# Patient Record
Sex: MALE | Race: WHITE
[De-identification: names, ages, dates, MRNs, and addresses within clinical notes are randomized per-mention and may not be internally consistent; named-entity substitution may affect disease eponyms.]

---

## 2019-02-04 ENCOUNTER — HOSPITAL ENCOUNTER (OUTPATIENT)
Dept: HOSPITAL 53 - M SMT | Age: 64
End: 2019-02-04
Attending: INTERNAL MEDICINE
Payer: MEDICARE

## 2019-02-04 DIAGNOSIS — M54.5: Primary | ICD-10-CM

## 2019-02-04 DIAGNOSIS — M15.0: ICD-10-CM

## 2019-02-04 NOTE — REP
LEFT SHOULDER, THREE VIEWS:

 

HISTORY:  Osteoarthritis.

 

There is no acute fracture or dislocation.  There is marked narrowing of the

acromioclavicular joint space with associated osteophyte formation.

Calcification is present superior to the acromioclavicular joint space.  This

represents ligamentous or tendon calcification.

 

IMPRESSION:

 

Degenerative change as described above.

 

 

Electronically Signed by

Cornelius Zuluaga MD 02/04/2019 01:06 P

## 2019-02-04 NOTE — REP
BILATERAL HAND, EIGHT VIEWS:

 

HISTORY:  Osteoarthritis.

 

RIGHT HAND:

 

There is no acute fracture or dislocation.  The joint spaces are normal in

appearance.  Osteophytes are present at the distal interphalangeal joints of the

second and third digits.

 

IMPRESSION:

 

Degenerative change as described above.

 

LEFT HAND:

 

There is no acute fracture or dislocation.  The joint spaces are normal in

appearance. Calcification is present lateral to the distal interphalangeal joint

of the second digit.  This represents ligamentous or tendon calcification.

 

IMPRESSION:

 

Degenerative change as described above.

 

 

Electronically Signed by

Cornelius Zuluaga MD 02/04/2019 01:07 P

## 2019-02-04 NOTE — REP
AP STANDING AND BILATERAL KNEES ONE VIEW:

 

HISTORY: Osteoarthritis.

 

There is no acute fracture or dislocation. There is minimal narrowing of the knee

joint spaces.

 

IMPRESSION:Degenerative changes as described above.

 

 

Electronically Signed by

Cornelius Zuluaga MD 02/04/2019 01:06 P

## 2019-02-04 NOTE — REP
PARTIAL LUMBAR SPINE, THREE VIEWS:

 

HISTORY:  Osteoarthritis.

 

There is no acute fracture or subluxation.  The L3-4 through L5-S1 intervertebral

discs are decreased in height consistent with disc degeneration.  Osteophytes are

present on L3-S1.

 

IMPRESSION:

 

Degenerative change as described above.

 

 

Electronically Signed by

Cornelius Zuluaga MD 02/04/2019 12:40 P

## 2021-03-05 ENCOUNTER — HOSPITAL ENCOUNTER (INPATIENT)
Dept: HOSPITAL 53 - M ED | Age: 66
LOS: 36 days | DRG: 163 | End: 2021-04-10
Attending: INTERNAL MEDICINE | Admitting: THORACIC SURGERY (CARDIOTHORACIC VASCULAR SURGERY)
Payer: MEDICARE

## 2021-03-05 VITALS — SYSTOLIC BLOOD PRESSURE: 120 MMHG | DIASTOLIC BLOOD PRESSURE: 70 MMHG

## 2021-03-05 VITALS — HEIGHT: 72 IN | WEIGHT: 157.63 LBS | BODY MASS INDEX: 21.35 KG/M2

## 2021-03-05 VITALS — SYSTOLIC BLOOD PRESSURE: 113 MMHG | DIASTOLIC BLOOD PRESSURE: 66 MMHG

## 2021-03-05 VITALS — DIASTOLIC BLOOD PRESSURE: 77 MMHG | SYSTOLIC BLOOD PRESSURE: 133 MMHG

## 2021-03-05 VITALS — DIASTOLIC BLOOD PRESSURE: 63 MMHG | SYSTOLIC BLOOD PRESSURE: 110 MMHG

## 2021-03-05 VITALS — SYSTOLIC BLOOD PRESSURE: 111 MMHG | DIASTOLIC BLOOD PRESSURE: 79 MMHG

## 2021-03-05 VITALS — SYSTOLIC BLOOD PRESSURE: 108 MMHG | DIASTOLIC BLOOD PRESSURE: 73 MMHG

## 2021-03-05 VITALS — SYSTOLIC BLOOD PRESSURE: 139 MMHG | DIASTOLIC BLOOD PRESSURE: 79 MMHG

## 2021-03-05 VITALS — SYSTOLIC BLOOD PRESSURE: 115 MMHG | DIASTOLIC BLOOD PRESSURE: 68 MMHG

## 2021-03-05 VITALS — SYSTOLIC BLOOD PRESSURE: 117 MMHG | DIASTOLIC BLOOD PRESSURE: 68 MMHG

## 2021-03-05 VITALS — DIASTOLIC BLOOD PRESSURE: 63 MMHG | SYSTOLIC BLOOD PRESSURE: 129 MMHG

## 2021-03-05 VITALS — SYSTOLIC BLOOD PRESSURE: 105 MMHG | DIASTOLIC BLOOD PRESSURE: 66 MMHG

## 2021-03-05 DIAGNOSIS — E87.5: ICD-10-CM

## 2021-03-05 DIAGNOSIS — D75.1: ICD-10-CM

## 2021-03-05 DIAGNOSIS — J44.9: ICD-10-CM

## 2021-03-05 DIAGNOSIS — J96.00: ICD-10-CM

## 2021-03-05 DIAGNOSIS — T81.82XA: ICD-10-CM

## 2021-03-05 DIAGNOSIS — I69.351: ICD-10-CM

## 2021-03-05 DIAGNOSIS — E11.40: ICD-10-CM

## 2021-03-05 DIAGNOSIS — J86.0: ICD-10-CM

## 2021-03-05 DIAGNOSIS — Z79.899: ICD-10-CM

## 2021-03-05 DIAGNOSIS — Z66: ICD-10-CM

## 2021-03-05 DIAGNOSIS — K21.9: ICD-10-CM

## 2021-03-05 DIAGNOSIS — R57.9: ICD-10-CM

## 2021-03-05 DIAGNOSIS — J84.10: ICD-10-CM

## 2021-03-05 DIAGNOSIS — I27.20: ICD-10-CM

## 2021-03-05 DIAGNOSIS — M19.90: ICD-10-CM

## 2021-03-05 DIAGNOSIS — M51.37: ICD-10-CM

## 2021-03-05 DIAGNOSIS — M32.9: ICD-10-CM

## 2021-03-05 DIAGNOSIS — I63.9: ICD-10-CM

## 2021-03-05 DIAGNOSIS — J93.83: Primary | ICD-10-CM

## 2021-03-05 DIAGNOSIS — E11.649: ICD-10-CM

## 2021-03-05 DIAGNOSIS — Z51.5: ICD-10-CM

## 2021-03-05 LAB
ALBUMIN SERPL BCG-MCNC: 3.9 GM/DL (ref 3.2–5.2)
ALT SERPL W P-5'-P-CCNC: 94 U/L (ref 12–78)
BASE EXCESS BLDA CALC-SCNC: 0.3 MMOL/L (ref -2–2)
BASOPHILS # BLD AUTO: 0.2 10^3/UL (ref 0–0.2)
BASOPHILS NFR BLD AUTO: 0.9 % (ref 0–1)
BILIRUB CONJ SERPL-MCNC: 0.4 MG/DL (ref 0–0.2)
BILIRUB SERPL-MCNC: 1.1 MG/DL (ref 0.2–1)
BUN SERPL-MCNC: 37 MG/DL (ref 7–18)
CALCIUM SERPL-MCNC: 8.8 MG/DL (ref 8.8–10.2)
CHLORIDE SERPL-SCNC: 104 MEQ/L (ref 98–107)
CK MB CFR.DF SERPL CALC: 1.64
CK MB SERPL-MCNC: 11.8 NG/ML (ref ?–3.6)
CK SERPL-CCNC: 721 U/L (ref 39–308)
CO2 BLDA CALC-SCNC: 27.5 MEQ/L (ref 23–31)
CO2 SERPL-SCNC: 32 MEQ/L (ref 21–32)
CREAT SERPL-MCNC: 1.07 MG/DL (ref 0.7–1.3)
EOSINOPHIL # BLD AUTO: 0 10^3/UL (ref 0–0.5)
EOSINOPHIL NFR BLD AUTO: 0.1 % (ref 0–3)
GFR SERPL CREATININE-BSD FRML MDRD: > 60 ML/MIN/{1.73_M2} (ref 49–?)
GLUCOSE SERPL-MCNC: 361 MG/DL (ref 70–100)
HCO3 BLDA-SCNC: 26.1 MEQ/L (ref 22–26)
HCO3 STD BLDA-SCNC: 24.7 MEQ/L (ref 22–26)
HCT VFR BLD AUTO: 57.4 % (ref 42–52)
HGB BLD-MCNC: 18 G/DL (ref 13.5–17.5)
LYMPHOCYTES # BLD AUTO: 0.2 10^3/UL (ref 1.5–5)
LYMPHOCYTES NFR BLD AUTO: 1.2 % (ref 24–44)
MCH RBC QN AUTO: 28.8 PG (ref 27–33)
MCHC RBC AUTO-ENTMCNC: 31.4 G/DL (ref 32–36.5)
MCV RBC AUTO: 91.8 FL (ref 80–96)
MONOCYTES # BLD AUTO: 0.5 10^3/UL (ref 0–0.8)
MONOCYTES NFR BLD AUTO: 2.9 % (ref 2–8)
NEUTROPHILS # BLD AUTO: 15.4 10^3/UL (ref 1.5–8.5)
NEUTROPHILS NFR BLD AUTO: 90.5 % (ref 36–66)
NT-PRO BNP: 2099 PG/ML (ref ?–125)
PCO2 BLDA: 45.8 MMHG (ref 35–45)
PH BLDA: 7.37 UNITS (ref 7.35–7.45)
PLATELET # BLD AUTO: 200 10^3/UL (ref 150–450)
PO2 BLDA: 78.3 MMHG (ref 75–100)
POTASSIUM SERPL-SCNC: 5.2 MEQ/L (ref 3.5–5.1)
PROT SERPL-MCNC: 6.6 GM/DL (ref 6.4–8.2)
RBC # BLD AUTO: 6.25 10^6/UL (ref 4.3–6.1)
SAO2 % BLDA: 96 % (ref 95–99)
SODIUM SERPL-SCNC: 140 MEQ/L (ref 136–145)
TROPONIN I SERPL-MCNC: 0.1 NG/ML (ref ?–0.1)
TSH SERPL DL<=0.005 MIU/L-ACNC: 0.52 UIU/ML (ref 0.36–3.74)
WBC # BLD AUTO: 17 10^3/UL (ref 4–10)

## 2021-03-05 PROCEDURE — 0W9930Z DRAINAGE OF RIGHT PLEURAL CAVITY WITH DRAINAGE DEVICE, PERCUTANEOUS APPROACH: ICD-10-PCS | Performed by: THORACIC SURGERY (CARDIOTHORACIC VASCULAR SURGERY)

## 2021-03-05 RX ADMIN — INSULIN LISPRO SCH UNITS: 100 INJECTION, SOLUTION INTRAVENOUS; SUBCUTANEOUS at 22:11

## 2021-03-05 RX ADMIN — KETOROLAC TROMETHAMINE SCH MG: 30 INJECTION, SOLUTION INTRAMUSCULAR at 17:30

## 2021-03-05 RX ADMIN — LEVALBUTEROL HYDROCHLORIDE SCH MG: 1.25 SOLUTION, CONCENTRATE RESPIRATORY (INHALATION) at 19:15

## 2021-03-05 RX ADMIN — DOCUSATE SODIUM SCH MG: 100 CAPSULE, LIQUID FILLED ORAL at 22:10

## 2021-03-05 RX ADMIN — HYDROXYCHLOROQUINE SULFATE SCH MG: 200 TABLET, FILM COATED ENTERAL at 22:09

## 2021-03-05 RX ADMIN — DOCUSATE SODIUM SCH MG: 100 CAPSULE, LIQUID FILLED ORAL at 22:16

## 2021-03-05 RX ADMIN — SODIUM CHLORIDE SCH UNITS: 4.5 INJECTION, SOLUTION INTRAVENOUS at 22:10

## 2021-03-05 NOTE — ECGEPIP
[facility] - ED

                                       

                                       Test Date:    2021

Pat Name:     ARIE FULLER               Department:   

Patient ID:   [id number]                 Room:         -

Gender:       Male                     Technician:   GLADYS

:          1955               Requested By: TITA GONZALES

Order Number: [id number]     Reading MD:   Yesenia Gee

                                 Measurements

Intervals                              Axis          

Rate:         80                       P:            80

SD:           182                      QRS:          20

QRSD:         100                      T:            -18

QT:           372                                    

QTc:          429                                    

                           Interpretive Statements

Normal sinus rhythm

Inferior infarct , age undetermined

NSTTW abnormalities

No prior

Electronically Signed on 3-5-2021 20:14:11 EST by Yesenia Gee

## 2021-03-05 NOTE — REP
INDICATION:

after chest tube placement



COMPARISON:

03/05/2021 at 3:16 p.m.



TECHNIQUE:

Portable AP view of the chest



FINDINGS:

Right-sided chest tube has been placed and there is been significant re-expansion to

the right hemithorax with small residual right apical pneumothorax now identified.

The bilateral lung fields demonstrate diffuse chronic fibrosis/interstitial changes.

No focal consolidation or effusion.



IMPRESSION:

Small residual right apical pneumothorax.

Underlying diffuse chronic interstitial changes again noted.





<Electronically signed by Nixon Rojas > 03/05/21 5034

## 2021-03-05 NOTE — HPE
HISTORY AND PHYSICAL



DATE OF ADMISSION:  03/05/2021



HISTORY OF PRESENT ILLNESS: Nixon Christine is a 65-year-old with pulmonary

interstitial fibrosis who is followed [facility] by Dr. Benitez and Dr. Emery. He presented with a spontaneous pneumothorax of the right lung. I saw

him soon after the chest tube was placed. He was having some pleuritic pain

which limited his interest in providing a whole history and physical, with more

of that to gather tomorrow. He has pulmonary interstitial fibrosis and sees the

pulmonologist. He has chronic low back pain. He used to go to the pain clinic

in [facility] for that. Type 2 diabetes. He is on chronic steroid

therapy. He was diagnosed with rheumatoid arthritis back in the 1980s. He saw a

rheumatologist in [place]. He saw [facility] rheumatologist, Dr. Bejarano when

she was in town, most recently two years ago. She thought that the patient had

osteoarthritis and not rheumatoid arthritis, as all serologic workup for

rheumatoid arthritis was unremarkable, including anti-CCP and negative

rheumatoid factor and negative inflammatory markers. Uric acid level was normal

and radiographic studies just showed degenerative disease. Since then, it looks

like he has been placed on some rheumatoid medications. We will investigate

that further tomorrow. In particular, he carries the diagnosis of lupus, but I

am not sure where the diagnosis was made and who is treating him with these

agents. 



SOCIAL HISTORY: He is a nonsmoker, no alcohol. 



FAMILY HISTORY: Positive for diabetes. Father had rheumatoid arthritis and lung

cancer. His sister has rheumatoid arthritis. 



ALLERGIES: None to any medications. 



REVIEW OF SYSTEMS: The review of systems was briefly obtained. No hemoptysis,

rectal bleeding, urinary bleeding, fever, chills, shortness of breath, stable

until abrupt worsening today.



PHYSICAL EXAMINATION:

VITAL SIGNS: He was seen in PCU and his chest tube had just been placed and he

was having some pleuritic pain. Respiratory rate is around 30. Vital signs per

flow sheet.

GENERAL APPEARANCE: He looks chronically ill. Temporal wasting noted.

Sarcopenia noted.

NECK: Supple. No thyromegaly.

LUNGS: Decreased breath sounds, fibrotic rales. Good air movement bilaterally. 

HEART:  Regular rhythm, no murmur. 

ABDOMEN: Soft and nontender with no masses. 

EXTREMITIES: No clubbing, cyanosis, or edema. Decreased pulses, but deep is

still palpable. 



IMPRESSION:

1.  Spontaneous pneumothorax status post right chest tube by Dr. Hay. He

    and I have discussed the case. He will remain with a chest tube. 

2.  Diabetes with a sliding scale of insulin based upon monitoring his blood

    sugars until his oral intake is assured.

3.  Mild hyperkalemia. I will change his IV fluids. Currently, he has IV fluids

    going with supplemental potassium. 

4.  ? lupus. We will investigate this further tomorrow. It does look like he is

    on chronic steroids which we will need to continue. He will be at risk of

    adrenal suppression, as his dose is actually 60 mg daily as it looks on his

    ER record.

5.  Chronic pain of lumbosacral degenerative disk disease and degenerative

    arthritis. Early ambulation advised. Chronic pain could limit his recovery.

## 2021-03-05 NOTE — RO
OPERATIVE NOTE



DATE OF OPERATION: 03/05/2021



PREOPERATIVE DIAGNOSIS: 

Acute right-sided pneumothorax.



POSTOPERATIVE DIAGNOSIS:

Acute right-sided pneumothorax.



PROCEDURE: Insertion of anterior-superior chest tube. 



SURGEON: Dr. Cruz Hay



DESCRIPTION OF PROCEDURE: Under satisfactory moderate sedation achieved with 2

mg of Versed, patient was prepped and draped in the usual sterile fashion.

Incision was made over the 2nd rib and tunnel created in the chest without

difficulty after infiltrating the skin, subcutaneous tissue, and pleura with

Xylocaine. A #20 chest tube was placed and aimed toward the apex. It was

secured to the chest wall with #2 Tevdek suture and connected to the

Pleur-evac. Patient tolerated the procedure well. A chest x-ray is pending.

## 2021-03-05 NOTE — REP
INDICATION:

DYSPNEA/COUGH



COMPARISON:

None.



TECHNIQUE:

Portable AP view of the chest



FINDINGS:

Large right pneumothorax.  The lung fields demonstrate diffuse advanced underlying

fibrotic and emphysematous changes bilaterally.  No focal consolidation.  No effusion.

Mild cardiomegaly cannot be excluded.



IMPRESSION:

Large right pneumothorax.  ER immediately notified.

Underlying emphysematous changes and fibrotic changes noted bilaterally.





<Electronically signed by Nixon Rojas > 03/05/21 0469

## 2021-03-06 VITALS — DIASTOLIC BLOOD PRESSURE: 58 MMHG | SYSTOLIC BLOOD PRESSURE: 96 MMHG

## 2021-03-06 VITALS — SYSTOLIC BLOOD PRESSURE: 110 MMHG | DIASTOLIC BLOOD PRESSURE: 60 MMHG

## 2021-03-06 VITALS — SYSTOLIC BLOOD PRESSURE: 137 MMHG | DIASTOLIC BLOOD PRESSURE: 67 MMHG

## 2021-03-06 VITALS — DIASTOLIC BLOOD PRESSURE: 57 MMHG | SYSTOLIC BLOOD PRESSURE: 111 MMHG

## 2021-03-06 VITALS — DIASTOLIC BLOOD PRESSURE: 68 MMHG | SYSTOLIC BLOOD PRESSURE: 153 MMHG

## 2021-03-06 VITALS — DIASTOLIC BLOOD PRESSURE: 64 MMHG | SYSTOLIC BLOOD PRESSURE: 102 MMHG

## 2021-03-06 LAB
BASE EXCESS BLDA CALC-SCNC: 0.8 MMOL/L (ref -2–2)
BASOPHILS # BLD AUTO: 0.1 10^3/UL (ref 0–0.2)
BASOPHILS NFR BLD AUTO: 0.8 % (ref 0–1)
BUN SERPL-MCNC: 37 MG/DL (ref 7–18)
CALCIUM SERPL-MCNC: 8.2 MG/DL (ref 8.8–10.2)
CHLORIDE SERPL-SCNC: 109 MEQ/L (ref 98–107)
CO2 BLDA CALC-SCNC: 31.5 MEQ/L (ref 23–31)
CO2 SERPL-SCNC: 34 MEQ/L (ref 21–32)
CREAT SERPL-MCNC: 0.87 MG/DL (ref 0.7–1.3)
EOSINOPHIL # BLD AUTO: 0.1 10^3/UL (ref 0–0.5)
EOSINOPHIL NFR BLD AUTO: 0.5 % (ref 0–3)
GFR SERPL CREATININE-BSD FRML MDRD: > 60 ML/MIN/{1.73_M2} (ref 49–?)
GLUCOSE SERPL-MCNC: 65 MG/DL (ref 70–100)
HCO3 BLDA-SCNC: 29.6 MEQ/L (ref 22–26)
HCO3 STD BLDA-SCNC: 25.2 MEQ/L (ref 22–26)
HCT VFR BLD AUTO: 55 % (ref 42–52)
HGB BLD-MCNC: 16.5 G/DL (ref 13.5–17.5)
LYMPHOCYTES # BLD AUTO: 0.7 10^3/UL (ref 1.5–5)
LYMPHOCYTES NFR BLD AUTO: 3.7 % (ref 24–44)
MAGNESIUM SERPL-MCNC: 2.8 MG/DL (ref 1.8–2.4)
MCH RBC QN AUTO: 28.6 PG (ref 27–33)
MCHC RBC AUTO-ENTMCNC: 30 G/DL (ref 32–36.5)
MCV RBC AUTO: 95.5 FL (ref 80–96)
MONOCYTES # BLD AUTO: 1.3 10^3/UL (ref 0–0.8)
MONOCYTES NFR BLD AUTO: 7 % (ref 2–8)
NEUTROPHILS # BLD AUTO: 15.8 10^3/UL (ref 1.5–8.5)
NEUTROPHILS NFR BLD AUTO: 85.1 % (ref 36–66)
PCO2 BLDA: 64.1 MMHG (ref 35–45)
PH BLDA: 7.28 UNITS (ref 7.35–7.45)
PLATELET # BLD AUTO: 162 10^3/UL (ref 150–450)
PO2 BLDA: 214 MMHG (ref 75–100)
POTASSIUM SERPL-SCNC: 4.6 MEQ/L (ref 3.5–5.1)
RBC # BLD AUTO: 5.76 10^6/UL (ref 4.3–6.1)
SAO2 % BLDA: 99.4 % (ref 95–99)
SODIUM SERPL-SCNC: 145 MEQ/L (ref 136–145)
WBC # BLD AUTO: 18.5 10^3/UL (ref 4–10)

## 2021-03-06 RX ADMIN — LEVALBUTEROL HYDROCHLORIDE SCH MG: 1.25 SOLUTION, CONCENTRATE RESPIRATORY (INHALATION) at 07:04

## 2021-03-06 RX ADMIN — PANTOPRAZOLE SODIUM SCH MG: 40 TABLET, DELAYED RELEASE ORAL at 07:37

## 2021-03-06 RX ADMIN — LEVALBUTEROL HYDROCHLORIDE SCH MG: 1.25 SOLUTION, CONCENTRATE RESPIRATORY (INHALATION) at 13:08

## 2021-03-06 RX ADMIN — SODIUM CHLORIDE SCH UNITS: 4.5 INJECTION, SOLUTION INTRAVENOUS at 20:53

## 2021-03-06 RX ADMIN — LEVALBUTEROL HYDROCHLORIDE SCH MG: 1.25 SOLUTION, CONCENTRATE RESPIRATORY (INHALATION) at 21:04

## 2021-03-06 RX ADMIN — ATOVAQUONE SCH MG: 750 SUSPENSION ORAL at 07:36

## 2021-03-06 RX ADMIN — KETOROLAC TROMETHAMINE SCH MG: 30 INJECTION, SOLUTION INTRAMUSCULAR at 11:54

## 2021-03-06 RX ADMIN — LEVALBUTEROL HYDROCHLORIDE SCH MG: 1.25 SOLUTION, CONCENTRATE RESPIRATORY (INHALATION) at 02:00

## 2021-03-06 RX ADMIN — INSULIN LISPRO SCH UNITS: 100 INJECTION, SOLUTION INTRAVENOUS; SUBCUTANEOUS at 17:33

## 2021-03-06 RX ADMIN — INSULIN LISPRO SCH UNITS: 100 INJECTION, SOLUTION INTRAVENOUS; SUBCUTANEOUS at 07:30

## 2021-03-06 RX ADMIN — KETOROLAC TROMETHAMINE SCH MG: 30 INJECTION, SOLUTION INTRAMUSCULAR at 06:37

## 2021-03-06 RX ADMIN — HYDROXYCHLOROQUINE SULFATE SCH MG: 200 TABLET, FILM COATED ENTERAL at 07:37

## 2021-03-06 RX ADMIN — MAGNESIUM HYDROXIDE SCH ML: 400 SUSPENSION ORAL at 07:37

## 2021-03-06 RX ADMIN — KETOROLAC TROMETHAMINE SCH MG: 30 INJECTION, SOLUTION INTRAMUSCULAR at 23:51

## 2021-03-06 RX ADMIN — INSULIN LISPRO SCH UNITS: 100 INJECTION, SOLUTION INTRAVENOUS; SUBCUTANEOUS at 20:58

## 2021-03-06 RX ADMIN — HYDROXYCHLOROQUINE SULFATE SCH MG: 200 TABLET, FILM COATED ENTERAL at 20:52

## 2021-03-06 RX ADMIN — KETOROLAC TROMETHAMINE SCH MG: 30 INJECTION, SOLUTION INTRAMUSCULAR at 00:50

## 2021-03-06 RX ADMIN — INSULIN LISPRO SCH UNITS: 100 INJECTION, SOLUTION INTRAVENOUS; SUBCUTANEOUS at 11:54

## 2021-03-06 RX ADMIN — SODIUM CHLORIDE SCH UNITS: 4.5 INJECTION, SOLUTION INTRAVENOUS at 07:37

## 2021-03-06 RX ADMIN — KETOROLAC TROMETHAMINE SCH MG: 30 INJECTION, SOLUTION INTRAMUSCULAR at 17:34

## 2021-03-06 RX ADMIN — DOCUSATE SODIUM SCH MG: 100 CAPSULE, LIQUID FILLED ORAL at 07:37

## 2021-03-06 NOTE — IPN
PROGRESS NOTE



DATE:  03/06/2021



SUBJECTIVE: Nixon is feeling fairly well today, less short of breath. No chest

pain. No fever. He is hard of hearing, it is difficult to history from him.



OBJECTIVE: 

VITAL SIGNS: Afebrile. Vital signs are stable. 

LUNGS: Good air movement bilaterally. 

HEART: Regular rate and rhythm.

ABDOMEN: Soft, nontender.

EXTREMITIES: No peripheral edema. 



LABORATORY DATA: Potassium 7.46, creatinine is normal, blood sugar this morning

was 65, he was given some juice and it came up. White count 18.5, hemoglobin

16.5, platelets 162,000. 



IMPRESSION: 

  1.  Spontaneous pneumothorax right lung, status post chest tube by Dr. Hay who is managing this.

  2.  Diabetes, sliding scale insulin coverage, hypoglycemia this morning, was

      treated without significant intervention. 

  3.  ? lupus, continue his current regimen including his fairly high dose

      steroids.

## 2021-03-06 NOTE — REP
INDICATION:

IPF, pneumothorax



COMPARISON:

None



TECHNIQUE:

Axial noncontrast images from the thoracic inlet to the upper abdomen with coronal and

sagittal reformations.



This CT examination was performed using the following dose reduction techniques:

Automated exposure control, adjustment of mA and/or kv according to the patient's

size, and use of iterative reconstruction technique.



FINDINGS:

Right-sided chest tube via anterior approach extends to the apex and a small residual

pneumothorax is identified.  Diffuse bilateral advanced pulmonary fibrosis with

bronchiectasis noted along with 6.3 cm chronic pneumatocele at the medial right base.

Linear opacity along the apical aspect of the right major fissure which may represent

chronic thickening, trapped fluid or element of atelectasis.  Small patchy scattered

bilateral opacities noted.  No effusion.



Mediastinum demonstrates scattered reactive adenopathy.  Atherosclerotic changes to

the thoracic aorta and coronary arteries noted without aortic aneurysm.  Cardiomegaly

noted without pericardial effusion.  Surrounding musculoskeletal structures are intact.



IMPRESSION:

1. Small residual right pneumothorax.

2. Advanced diffuse pulmonary fibrosis and chronic changes.

3. Subtle small scattered opacities may reflect acute areas of infiltrate versus

chronic change.





<Electronically signed by Nixon Rojas > 03/06/21 0850

## 2021-03-06 NOTE — IPN
PROGRESS NOTE



DATE:  03/06/2021



SUBJECTIVE: Mr. Christine is breathing a lot better today. He still has an

increased respiratory rate, but he can now speak in full sentences.  



OBJECTIVE: 

VITAL SIGNS: Show a T-max of 97.9 with a heart rate that ranges between 66 and

79 in sinus rhythm. Respiratory rate of 18 to 19 without the use of accessory

muscles who is 96% to 97% saturated on 4 liters nasal cannula and whose blood

pressure is ranging between 153/68 to 102/64. When I examined him this morning,

his respiratory rate is more in the mid to high 20s. 

INTAKE AND OUTPUT: Over the past 24 hours has been recorded as 710 in and 400

out for a positivity of 300 mL. His chest tube output is not recorded. His

weight today is 66.1 kg compared to 63.1 kg yesterday. I do not see an air leak

in his chest tube. I will speak to the nursing staff regarding the absence of

chest tube output recording. 

RESPIRATORY: He has equal breath sounds on either side. At the very end of

inspiration, he has velcro crackles. Scattered among the velcro crackles are

coarse rhonchi, which do not clear with coughing. Percussion note is full to

the diaphragm. There is no subcutaneous emphysema on the chest wall.  

CARDIAC: Without murmurs, clicks, gallops, or rubs. I cannot feel his PMI. S1

and S2 are normal. 

ABDOMEN: Soft and nontender. Bowel sounds are positive. There is no

hepatomegaly. No CVA tenderness. 

EXTREMITIES: Show no pretibial edema. No calf tenderness. No differential

swelling of the upper extremities. 

SKIN: Warm, dry, and perfused without cyanosis or mottling, including that of

the nail beds and knees. 

NECK: Supple. There is no jugular venous distention. No subcutaneous emphysema.

Trachea is midline.

MOUTH: Shows the mucous membranes to be pink and moist. Lips and commisures are

without lesions and no thrush.

EYES: Show his pupils equal and reactive. Extraocular motions are intact.

Sclerae nonicteric. 

NEUROLOGIC: Shows II through XII intact. Normal gross motor, gross sensation

intact. Gait is not tested. 

PSYCHIATRIC: Shows him to be awake, alert, and oriented x3 with appropriate

mood and affect and conversational. 



LABORATORY DATA: His white count today is 18.5 slightly up from 17.0 yesterday.

Hemoglobin and hematocrit are 16.5 and 55.8 slightly improved from 18 and 57.4.

This represents hypoxic polycythemia. Platelet count is 162,000 and stable, and

differential shows 85% neutrophils, 3% lymphocytes, and 7% monocytes. There are

no immature forms and no toxic granulations.



His electrolytes show a marginally high total CO2 of 34 with a BUN and

creatinine of 37 and 0.87. Glucose is 65 with a calcium of 8.2. Blood gases

this morning show a pH of 7.28, pCO2 of 64 with a pO2 of 214 on 4 liters nasal

cannula. Base excess is 0.8. 



IMAGING DATA: His chest x-ray today shows small apical air space on the right

side. Chest tube is in good position at the apex. There looks to be a large

bulla on the chest x-ray inferiorly. This is seen inferiorly and medially. He

has fibrotic changes with a reticular pattern with thickened septa. 



I did order a CT of his chest today. The CT of the chest shows advanced

pulmonary fibrosis with honeycombing throughout. It has progressed from being

peripheral honeycombing to parenchymal honeycombing traction bronchiectasis.

There is a small air space anteriorly. There is mediastinal lymphadenopathy,

although the chest CT is not contrasted. He has lots of patch infiltrates and

small patchy areas of consolidation throughout. The large bulla as seen on the

chest x-ray is seen also n the CT scan at the medial costophrenic angle.

Numerous cystic changes throughout consistent with the honeycombing. 



IMPRESSION:

1.  Spontaneous pneumothorax right side. 

2.  Advanced interstitial lung disease. 

3.  Contributing chronic obstructive pulmonary disease (COPD). 

4.  Hypoxic polycythemia. 

5.  Gastroesophageal reflux disease (GERD). 

6.  Diabetes. 



PLAN AND DISCUSSION: I will keep his chest tube on suction today as he still

has a small pneumothorax and residual air space. His chest CT radiographically

confirms his pulmonary fibrosis. I have a sinking feeling that the pneumothorax

may very well reoccur from the numerous cystic spaces that are replacing the

lung. Right now he does not have an air leak. I will keep his chest tube on

suction in order to try and get the lung completely expanded to the chest wall.

## 2021-03-06 NOTE — REP
INDICATION:

after chest tube placement



COMPARISON:

03/05/2021



TECHNIQUE:

PA and lateral.



FINDINGS:

Right apical chest tube in stable position with small residual right apical

pneumothorax unchanged.  Diffuse bilateral chronic fibrosis and interstitial disease

remains stable.  No new acute consolidation or effusion.  Mediastinum and cardiac

silhouette within normal limits.



IMPRESSION:

No significant change from prior examination.  Small residual right apical

pneumothorax.





<Electronically signed by Nixon Rojas > 03/06/21 0802

## 2021-03-07 VITALS — DIASTOLIC BLOOD PRESSURE: 56 MMHG | SYSTOLIC BLOOD PRESSURE: 105 MMHG

## 2021-03-07 VITALS — DIASTOLIC BLOOD PRESSURE: 62 MMHG | SYSTOLIC BLOOD PRESSURE: 110 MMHG

## 2021-03-07 VITALS — DIASTOLIC BLOOD PRESSURE: 58 MMHG | SYSTOLIC BLOOD PRESSURE: 106 MMHG

## 2021-03-07 VITALS — DIASTOLIC BLOOD PRESSURE: 56 MMHG | SYSTOLIC BLOOD PRESSURE: 100 MMHG

## 2021-03-07 VITALS — SYSTOLIC BLOOD PRESSURE: 101 MMHG | DIASTOLIC BLOOD PRESSURE: 57 MMHG

## 2021-03-07 LAB
BASOPHILS # BLD AUTO: 0.1 10^3/UL (ref 0–0.2)
BASOPHILS NFR BLD AUTO: 0.9 % (ref 0–1)
BUN SERPL-MCNC: 42 MG/DL (ref 7–18)
CALCIUM SERPL-MCNC: 8.5 MG/DL (ref 8.8–10.2)
CHLORIDE SERPL-SCNC: 109 MEQ/L (ref 98–107)
CO2 SERPL-SCNC: 33 MEQ/L (ref 21–32)
CREAT SERPL-MCNC: 0.77 MG/DL (ref 0.7–1.3)
EOSINOPHIL # BLD AUTO: 0.1 10^3/UL (ref 0–0.5)
EOSINOPHIL NFR BLD AUTO: 0.3 % (ref 0–3)
GFR SERPL CREATININE-BSD FRML MDRD: > 60 ML/MIN/{1.73_M2} (ref 49–?)
GLUCOSE SERPL-MCNC: 104 MG/DL (ref 70–100)
HCT VFR BLD AUTO: 54.9 % (ref 42–52)
HGB BLD-MCNC: 16.3 G/DL (ref 13.5–17.5)
LYMPHOCYTES # BLD AUTO: 0.5 10^3/UL (ref 1.5–5)
LYMPHOCYTES NFR BLD AUTO: 3.3 % (ref 24–44)
MAGNESIUM SERPL-MCNC: 2.7 MG/DL (ref 1.8–2.4)
MCH RBC QN AUTO: 28.7 PG (ref 27–33)
MCHC RBC AUTO-ENTMCNC: 29.7 G/DL (ref 32–36.5)
MCV RBC AUTO: 96.7 FL (ref 80–96)
MONOCYTES # BLD AUTO: 1.1 10^3/UL (ref 0–0.8)
MONOCYTES NFR BLD AUTO: 7.1 % (ref 2–8)
NEUTROPHILS # BLD AUTO: 13.4 10^3/UL (ref 1.5–8.5)
NEUTROPHILS NFR BLD AUTO: 84.4 % (ref 36–66)
PLATELET # BLD AUTO: 143 10^3/UL (ref 150–450)
POTASSIUM SERPL-SCNC: 4.6 MEQ/L (ref 3.5–5.1)
RBC # BLD AUTO: 5.68 10^6/UL (ref 4.3–6.1)
SODIUM SERPL-SCNC: 144 MEQ/L (ref 136–145)
WBC # BLD AUTO: 15.8 10^3/UL (ref 4–10)

## 2021-03-07 RX ADMIN — HYDROXYCHLOROQUINE SULFATE SCH MG: 200 TABLET, FILM COATED ENTERAL at 20:19

## 2021-03-07 RX ADMIN — KETOROLAC TROMETHAMINE SCH MG: 30 INJECTION, SOLUTION INTRAMUSCULAR at 12:18

## 2021-03-07 RX ADMIN — INSULIN LISPRO SCH UNITS: 100 INJECTION, SOLUTION INTRAVENOUS; SUBCUTANEOUS at 12:18

## 2021-03-07 RX ADMIN — SODIUM CHLORIDE SCH UNITS: 4.5 INJECTION, SOLUTION INTRAVENOUS at 20:19

## 2021-03-07 RX ADMIN — INSULIN LISPRO SCH UNITS: 100 INJECTION, SOLUTION INTRAVENOUS; SUBCUTANEOUS at 17:02

## 2021-03-07 RX ADMIN — INSULIN LISPRO SCH UNITS: 100 INJECTION, SOLUTION INTRAVENOUS; SUBCUTANEOUS at 20:24

## 2021-03-07 RX ADMIN — KETOROLAC TROMETHAMINE SCH MG: 30 INJECTION, SOLUTION INTRAMUSCULAR at 17:02

## 2021-03-07 RX ADMIN — LEVALBUTEROL HYDROCHLORIDE SCH MG: 1.25 SOLUTION, CONCENTRATE RESPIRATORY (INHALATION) at 01:24

## 2021-03-07 RX ADMIN — INSULIN LISPRO SCH UNITS: 100 INJECTION, SOLUTION INTRAVENOUS; SUBCUTANEOUS at 07:30

## 2021-03-07 RX ADMIN — LEVALBUTEROL HYDROCHLORIDE SCH MG: 1.25 SOLUTION, CONCENTRATE RESPIRATORY (INHALATION) at 19:33

## 2021-03-07 RX ADMIN — LEVALBUTEROL HYDROCHLORIDE SCH MG: 1.25 SOLUTION, CONCENTRATE RESPIRATORY (INHALATION) at 07:08

## 2021-03-07 RX ADMIN — PANTOPRAZOLE SODIUM SCH MG: 40 TABLET, DELAYED RELEASE ORAL at 08:18

## 2021-03-07 RX ADMIN — MAGNESIUM HYDROXIDE SCH ML: 400 SUSPENSION ORAL at 08:41

## 2021-03-07 RX ADMIN — KETOROLAC TROMETHAMINE SCH MG: 30 INJECTION, SOLUTION INTRAMUSCULAR at 06:27

## 2021-03-07 RX ADMIN — DOCUSATE SODIUM SCH MG: 100 CAPSULE, LIQUID FILLED ORAL at 20:28

## 2021-03-07 RX ADMIN — LEVALBUTEROL HYDROCHLORIDE SCH MG: 1.25 SOLUTION, CONCENTRATE RESPIRATORY (INHALATION) at 12:55

## 2021-03-07 RX ADMIN — DOCUSATE SODIUM SCH MG: 100 CAPSULE, LIQUID FILLED ORAL at 08:41

## 2021-03-07 RX ADMIN — SODIUM CHLORIDE SCH UNITS: 4.5 INJECTION, SOLUTION INTRAVENOUS at 08:17

## 2021-03-07 RX ADMIN — HYDROXYCHLOROQUINE SULFATE SCH MG: 200 TABLET, FILM COATED ENTERAL at 08:18

## 2021-03-07 RX ADMIN — ATOVAQUONE SCH MG: 750 SUSPENSION ORAL at 08:18

## 2021-03-07 NOTE — IPN
PROGRESS NOTE



DATE:  03/07/2021



SUBJECTIVE: Mr. Christine is a little bit more short of breath today. He went down

to x-ray and he was very short of breath upon his return. Certainly he is not

as short of breath as he was when he had his pneumothorax, but still his

shortness of breath is a manifestation of his advanced pulmonary fibrosis as

seen on his CT yesterday. 



OBJECTIVE: 

VITAL SIGNS: Show a T-max of 97.9 with a heart rate that ranges between 67 and

90 in sinus rhythm. Respiratory rate of 17 to 18 without the use of accessory

muscles who is 90% to 100% saturated on 3 liters nasal cannula and whose blood

pressure is ranging between 100/56 to 106/58. 

INTAKE AND OUTPUT: Over the past 24 hours has been recorded as 3330 in and 1518

out for a positivity of 1812 mL. He has put out 37 mL in the chest tube. His

weight today is 62.5 kg compared to 66.1 kg yesterday. 

RESPIRATORY: He has velcro crackles throughout late inspiration. I also hear

very coarse rhonchi, particularly on the right side. Percussion notes are full

to the diaphragm.  

CARDIAC: Without murmurs, clicks, gallops, or rubs. I cannot feel his PMI. S1

and S2 are normal. 

ABDOMEN: Soft and nontender. Bowel sounds are positive. There is no

hepatomegaly. No CVA tenderness. 

EXTREMITIES: Show no pretibial edema. No calf tenderness. No differential

swelling of the upper extremities. 

SKIN: Warm, dry, and perfused without cyanosis or mottling, including that of

the nail beds and knees. 

NECK: Supple. There is no jugular venous distention. No subcutaneous emphysema.

Trachea is midline.

MOUTH: Shows the mucous membranes to be pink and moist. Lips and commisures are

without lesions and no thrush.

EYES: Show his pupils equal and reactive. Extraocular movements are intact.

Sclerae nonicteric. 

NEUROLOGIC: Shows II through XII intact. Normal gross motor, gross sensation

intact. Gait is not tested. 

PSYCHIATRIC: Shows him to be awake, alert, and oriented x3 with appropriate

mood and affect and conversational. 



LABORATORY DATA: His white count today is 15.6 with a hemoglobin and hematocrit

of 16.3 and 54.9, unchanged from yesterday, with a platelet count of 143,000.

Differential shows 84% neutrophils, 3% lymphocytes, and 7% monocytes. There are

no immature forms and no toxic granulations. 



His electrolytes show a marginally elevated total CO2 of 33 with a BUN and

creatinine of 42 and 77. Glucose is 104 with a calcium of 8.5 and magnesium of

2.7. 



IMAGING DATA: His chest x-ray today shows an apical air space in the right

upper hemithorax. Chest tube is in good place at the apex of the hemithorax. He

has patchy reticular infiltrates throughout consistent with his pulmonary

fibrosis, worse on the right than the left. Costophrenic angles are sharp. I do

not see overt infiltrates. 



IMPRESSION:

1.  Spontaneous pneumothorax right side. 

2.  Advanced interstitial lung disease clinically idiopathic pulmonary

    fibrosis. 

3.  Contributing underlying chronic obstructive pulmonary disease (COPD).

4.  Hypoxic polycythemia. 

5.  Gastroesophageal reflux disease.

6.  Diabetes.



PLAN AND DISCUSSION: I will discontinue his suction today. I am concerned that

this may be the first of recurrent pneumothoraces because of his advanced

pulmonary fibrosis and cystic disease. If his chest x-ray is stable tomorrow

and there is no air leak, I will remove his chest tubes. Nursing staff tells me

that he has difficulty in his present living situation, as he has his bedroom

upstairs and he literally "crawls" up the stairs to get there. He is going to

need alternate living arrangements. Dr. Benitez did have a leora discussion with

him about his prognosis and end-of-life care. She had him sign a MOLST form. If

he is not going to consider a lung transplant or antifibrosis therapy, Hospice

may be a good arrangement for him, as his life expectancy is rather short.

## 2021-03-07 NOTE — REP
INDICATION:

after chest tube placement



COMPARISON:

03/06/2021



TECHNIQUE:

PA and lateral.



FINDINGS:

Right apical chest tube is again identified and a very small residual right apical

pneumothorax is appreciated.  Right-sided subcutaneous emphysema and possible new

right basilar opacity cannot be excluded.  Diffuse underlying chronic bilateral

fibrosis unchanged.



IMPRESSION:

Very small residual right apical pneumothorax.

Cannot exclude subtle forming right basilar opacity.







<Electronically signed by Nixon Rojas > 03/07/21 2273

## 2021-03-07 NOTE — IPN
PROGRESS NOTE



DATE:  03/07/2021



SUBJECTIVE: Nixon was seen in the PCU. He has a chest tube in, he feels well as

far as his chest tube goes. He has spent some time talking to nursing staff and

then with me about planning some end of life care. He has a MOLST form which is

a DNR. He apparently was told by Dr. Benitez about a year ago that he had a year

to a year and a half to live and says "I have already hit the year." 

Apparently, he is not able to be strong enough anymore to ascend stairs to get

in and out of his apartment, he is looking for placement upon discharge. He is

not really appropriate for the [facility]osp[facility] residence yet, but he is not able to

live at home either.



OBJECTIVE: 

VITAL SIGNS: Stable. 

LUNGS:  Air movement bilaterally, fibrotic rales bilaterally.

HEART: Regular rhythm.

ABDOMEN: Soft, nontender. 

EXTREMITIES: No peripheral edema.



LABORATORY DATA: White count 14.8, hemoglobin 16.3, platelets 143,000, sodium

144, potassium 4.6, BUN 42, creatinine 0.7, glucose 104. 



I have reviewed his MOLST form which is a DNR and DNI. 



IMPRESSION:

  1.  Spontaneous pneumothorax right side status post chest tube placement. He

      has advanced interstitial lung disease that is becoming progressively

      worse. His life expectancy is limited. I appreciate Dr. Hay's input.

      Patient has a DNR status which was reaffirmed today. 

  2.  Diabetes, on sliding scale insulin, has not been hypoglycemic in the last

      24 hours.

  3.  Lupus, we confirmed he has lupus, followed by Dr. Lam, a rheumatologist

      in [place]. I told Nixon that I will call his rheumatologist tomorrow to

      make him aware of the admission. 

  4.  Disposition: Patient will need discharge to an alternative living

      arrangement upon discharge. He will not be able to go home.

## 2021-03-08 VITALS — SYSTOLIC BLOOD PRESSURE: 100 MMHG | DIASTOLIC BLOOD PRESSURE: 61 MMHG

## 2021-03-08 VITALS — DIASTOLIC BLOOD PRESSURE: 61 MMHG | SYSTOLIC BLOOD PRESSURE: 96 MMHG

## 2021-03-08 VITALS — SYSTOLIC BLOOD PRESSURE: 105 MMHG | DIASTOLIC BLOOD PRESSURE: 59 MMHG

## 2021-03-08 VITALS — SYSTOLIC BLOOD PRESSURE: 98 MMHG | DIASTOLIC BLOOD PRESSURE: 57 MMHG

## 2021-03-08 VITALS — SYSTOLIC BLOOD PRESSURE: 101 MMHG | DIASTOLIC BLOOD PRESSURE: 60 MMHG

## 2021-03-08 VITALS — DIASTOLIC BLOOD PRESSURE: 63 MMHG | SYSTOLIC BLOOD PRESSURE: 106 MMHG

## 2021-03-08 LAB
BASOPHILS # BLD AUTO: 0.1 10^3/UL (ref 0–0.2)
BASOPHILS NFR BLD AUTO: 0.6 % (ref 0–1)
BUN SERPL-MCNC: 38 MG/DL (ref 7–18)
CALCIUM SERPL-MCNC: 8.2 MG/DL (ref 8.8–10.2)
CHLORIDE SERPL-SCNC: 109 MEQ/L (ref 98–107)
CO2 SERPL-SCNC: 33 MEQ/L (ref 21–32)
CREAT SERPL-MCNC: 0.79 MG/DL (ref 0.7–1.3)
EOSINOPHIL # BLD AUTO: 0.2 10^3/UL (ref 0–0.5)
EOSINOPHIL NFR BLD AUTO: 0.9 % (ref 0–3)
GFR SERPL CREATININE-BSD FRML MDRD: > 60 ML/MIN/{1.73_M2} (ref 49–?)
GLUCOSE SERPL-MCNC: 112 MG/DL (ref 70–100)
HCT VFR BLD AUTO: 54.4 % (ref 42–52)
HGB BLD-MCNC: 16.2 G/DL (ref 13.5–17.5)
LYMPHOCYTES # BLD AUTO: 0.8 10^3/UL (ref 1.5–5)
LYMPHOCYTES NFR BLD AUTO: 4.9 % (ref 24–44)
MAGNESIUM SERPL-MCNC: 2.4 MG/DL (ref 1.8–2.4)
MCH RBC QN AUTO: 29 PG (ref 27–33)
MCHC RBC AUTO-ENTMCNC: 29.8 G/DL (ref 32–36.5)
MCV RBC AUTO: 97.5 FL (ref 80–96)
MONOCYTES # BLD AUTO: 1 10^3/UL (ref 0–0.8)
MONOCYTES NFR BLD AUTO: 6.4 % (ref 2–8)
NEUTROPHILS # BLD AUTO: 13.3 10^3/UL (ref 1.5–8.5)
NEUTROPHILS NFR BLD AUTO: 84.1 % (ref 36–66)
PLATELET # BLD AUTO: 130 10^3/UL (ref 150–450)
POTASSIUM SERPL-SCNC: 4.7 MEQ/L (ref 3.5–5.1)
RBC # BLD AUTO: 5.58 10^6/UL (ref 4.3–6.1)
SODIUM SERPL-SCNC: 142 MEQ/L (ref 136–145)
WBC # BLD AUTO: 15.9 10^3/UL (ref 4–10)

## 2021-03-08 RX ADMIN — INSULIN LISPRO SCH UNITS: 100 INJECTION, SOLUTION INTRAVENOUS; SUBCUTANEOUS at 12:07

## 2021-03-08 RX ADMIN — KETOROLAC TROMETHAMINE SCH MG: 30 INJECTION, SOLUTION INTRAMUSCULAR at 06:43

## 2021-03-08 RX ADMIN — SODIUM CHLORIDE SCH UNITS: 4.5 INJECTION, SOLUTION INTRAVENOUS at 20:56

## 2021-03-08 RX ADMIN — KETOROLAC TROMETHAMINE SCH MG: 30 INJECTION, SOLUTION INTRAMUSCULAR at 12:07

## 2021-03-08 RX ADMIN — LEVALBUTEROL HYDROCHLORIDE SCH MG: 1.25 SOLUTION, CONCENTRATE RESPIRATORY (INHALATION) at 13:04

## 2021-03-08 RX ADMIN — KETOROLAC TROMETHAMINE SCH MG: 30 INJECTION, SOLUTION INTRAMUSCULAR at 00:43

## 2021-03-08 RX ADMIN — LEVALBUTEROL HYDROCHLORIDE SCH MG: 1.25 SOLUTION, CONCENTRATE RESPIRATORY (INHALATION) at 19:18

## 2021-03-08 RX ADMIN — LEVALBUTEROL HYDROCHLORIDE SCH MG: 1.25 SOLUTION, CONCENTRATE RESPIRATORY (INHALATION) at 07:12

## 2021-03-08 RX ADMIN — DOCUSATE SODIUM SCH MG: 100 CAPSULE, LIQUID FILLED ORAL at 09:28

## 2021-03-08 RX ADMIN — DOCUSATE SODIUM SCH MG: 100 CAPSULE, LIQUID FILLED ORAL at 20:56

## 2021-03-08 RX ADMIN — SODIUM CHLORIDE SCH UNITS: 4.5 INJECTION, SOLUTION INTRAVENOUS at 09:28

## 2021-03-08 RX ADMIN — INSULIN LISPRO SCH UNITS: 100 INJECTION, SOLUTION INTRAVENOUS; SUBCUTANEOUS at 09:29

## 2021-03-08 RX ADMIN — MAGNESIUM HYDROXIDE SCH ML: 400 SUSPENSION ORAL at 09:00

## 2021-03-08 RX ADMIN — ATOVAQUONE SCH MG: 750 SUSPENSION ORAL at 09:29

## 2021-03-08 RX ADMIN — LEVALBUTEROL HYDROCHLORIDE SCH MG: 1.25 SOLUTION, CONCENTRATE RESPIRATORY (INHALATION) at 02:03

## 2021-03-08 RX ADMIN — HYDROXYCHLOROQUINE SULFATE SCH MG: 200 TABLET, FILM COATED ENTERAL at 09:28

## 2021-03-08 RX ADMIN — INSULIN LISPRO SCH UNITS: 100 INJECTION, SOLUTION INTRAVENOUS; SUBCUTANEOUS at 17:21

## 2021-03-08 RX ADMIN — HYDROXYCHLOROQUINE SULFATE SCH MG: 200 TABLET, FILM COATED ENTERAL at 20:56

## 2021-03-08 RX ADMIN — PANTOPRAZOLE SODIUM SCH MG: 40 TABLET, DELAYED RELEASE ORAL at 09:28

## 2021-03-08 RX ADMIN — INSULIN LISPRO SCH UNITS: 100 INJECTION, SOLUTION INTRAVENOUS; SUBCUTANEOUS at 20:57

## 2021-03-08 RX ADMIN — KETOROLAC TROMETHAMINE SCH MG: 30 INJECTION, SOLUTION INTRAMUSCULAR at 17:20

## 2021-03-08 NOTE — IPN
PROGRESS NOTE



DATE:  03/08/2021



SUBJECTIVE: Mr. Christine is feeling a bit better today. He did walk down to chest

x-ray today. He is not short of breath at rest. His pain is being

well-controlled with oral analgesics.



OBJECTIVE: 

VITAL SIGNS: Show a T-max of 98.0 with a heart rate that ranges between 85 and

80 in sinus rhythm. Respiratory rate of 14 to 20 without the use of accessory

muscles who is 97% saturated on 3 liters nasal cannula and whose blood pressure

is ranging between 96/61 to 105/59. 

INTAKE AND OUTPUT: Over the past 24 hours has been recorded as 2890 in and 1065

out for a positivity of 1800 mL. He is now according to the Is and Os nearly

3600 mL over 48 hours. He has put out 1000 mL in urine, 65 mL from the chest

tube, and there is no air leak. 

RESPIRATORY: He has equal breath sounds on either side. He has velcro crackles

at the end of inspiration on either side with the right greater than the left.

Percussion notes are full to the diaphragm. 

CARDIAC: Without murmurs, clicks, gallops, or rubs. I cannot feel his PMI. S1

and S2 are normal. 

ABDOMEN: Soft and nontender. Bowel sounds are positive. There is no

hepatomegaly. No CVA tenderness. 

EXTREMITIES: Show no pretibial edema. No calf tenderness. No differential

swelling of the upper extremities. 

SKIN: Warm, dry, and perfused without cyanosis or mottling, including that of

the nail beds and knees. 

NECK: Supple. There is no jugular venous distention. No subcutaneous emphysema.

Trachea is midline.

MOUTH: Shows the mucous membranes to be pink and moist. Lips and commisures are

without lesions and no thrush.

EYES: Show his pupils equal and reactive. Extraocular movements are intact.

Sclerae nonicteric. 

NEUROLOGIC: Shows II through XII intact. Normal gross motor, gross sensation

intact. Gait is not tested. 

PSYCHIATRIC: Shows him to be awake, alert, and oriented x3 with appropriate

mood and affect and conversational. 



LABORATORY DATA: His white count today is 15.9, unchanged from yesterday, with

hemoglobin and hematocrit of 16.2 and 54.4 also unchanged from yesterday.

Platelet count is 130,000 and stable. Differential shows 84% neutrophils, 4%

lymphocytes, and 6% monocytes. There are no immature forms and no toxic

granulations. 



His electrolytes show a marginally high total CO2 of 30. BUN and creatinine are

38 and 0.79. Glucose is 112 with a calcium of 8.2 and a magnesium of 2.4. 



IMAGING DATA: His chest x-ray shows a small apical air space in the right upper

hemithorax. There is some overlying subcutaneous emphysema in the upper

hemithorax. This is unchanged from yesterday. He has reticular infiltrates

consistent with his pulmonary fibrosis. There are no other infiltrates or

consolidations. 



IMPRESSION:

1.  Spontaneous right pneumothorax.

2.  Advanced interstitial lung disease clinically idiopathic pulmonary

    fibrosis. 

3.  Underlying chronic obstructive pulmonary disease (COPD). 

4.  Hypoxic polycythemia.

5.  Gastroesophageal reflux disease.

6.  Diabetes.



PLAN AND DISCUSSION: As he has no air leak today, I will remove his chest tube.

I do have that sinking feeling that this will reoccur, considering the extent

of his cystic disease. I will leave his fluid balance to the medical service.

He seems none the worse for it. He certainly has not reflected that in his

chest tube output. From my perspective, he can go home tomorrow; but there are

social aspects that need to be addressed.

## 2021-03-08 NOTE — REP
INDICATION:

after chest tube placement



COMPARISON:

03/07/2021



TECHNIQUE:

PA and lateral.



FINDINGS:

Right apical chest tube in stable position.  Small residual right apical pneumothorax.

Underlying chronic bilateral fibrosis and interstitial changes again noted.  No new

acute process identified mediastinum and cardiac silhouette stable.  Skeletal

structures stable.



IMPRESSION:

Small right apical pneumothorax again noted.

No new acute process appreciated.





<Electronically signed by Nixon Rojas > 03/08/21 8077

## 2021-03-08 NOTE — CR
CONSULTATION

DATE: 03/05/2021



Patient seen at the request of Dr. Manzo of the emergency room and the

hospitalist service, Dr. Lobato, for increasing shortness of breath and

pneumothorax by chest x-ray.



HISTORY OF PRESENT ILLNESS:  Patient is a 65-year-old white male with known

idiopathic pulmonary fibrosis whose acute story starts about 2 weeks ago, when

he started to get more and more short of breath. He presented to the pulmonary

service's offices on February 9 to see Dr. Benitez, where he was hypoxic and 82%

saturated on room air. He had been losing weight and was more short of breath

with minimal exertion. A year ago, he evidently could walk 5 miles. The last 2

weeks his shortness of breath has gotten significantly worse and over the last

2 days even worse than the last 2 weeks. He can hardly move around in bed

without getting short of breath, and upon taking history today he can barely

speak in full sentences, thus making the history difficult to obtain. He denies

fever, chills, or sweats. He has had a near 40-pound weight loss. He states he

is eating well. He has a slight cough. No sputum production. Notably, he does

not complain of chest pain or chest discomfort other than tightness when he is

trying to take a deep breath. There is no dysphagia. 



MEDICAL HISTORY:

1. Idiopathic pulmonary fibrosis.

2. Systemic lupus erythematosus, recently diagnosed.

3. Non-insulin-dependent diabetes.

4. Osteoarthritis.

5. Diabetic neuropathy.

6. Chronic back pain.



SURGICAL HISTORY:  None. 



HOME MEDICATIONS: 

- albuterol nebulizer four times a day as needed for shortness of breath

- atovaquone 10 mg daily 

- Dexilant 60 mg daily 

- Glyxambi 25/5 daily 

- Breo Ellipta 200/25 one puff daily 

- glimepiride 8 mg daily 

- hydroxychloroquine 200 mg twice a day 

- Actos 45 mg daily 

- prednisone 60 mg daily 

- Spiriva 18 mcg one puff daily 



HABITS: Is a life-time nonsmoker, although he does state that he will have a

bit of a cigar very occasionally for perhaps birthdays or holidays. No illicit

drugs. He will have a few beers on the weekend. 



TRAVEL HISTORY: Not obtained.



EXPOSURES: No exposures to tuberculosis. No birds or cats at home. Does have

two dogs, a Lab and a Beagle.



OCCUPATIONAL HISTORY: Worked for the [employer]. Does not think

he has had any asbestos exposure. 



FAMILY HISTORY:  No relevant to the acute situation.



REVIEW OF SYSTEMS:

CONSTITUTIONAL: See history of present illness (HPI). Without fever, chills,

sweats, or night sweats. Does have significant weight loss. This has happened

over the course of the past year.

EYES: Without diplopia, without prior jaundice, without amaurosis fugax. 

NOSE: Without epistaxis.

MOUTH: Edentulous. 

RESPIRATORY: See HPI. 

CARDIAC: Without palpitations, without tachycardias, without prior myocardial

infarctions. Denies peripheral edema or intermittent claudication. 

GASTROINTESTINAL: Without nausea, vomiting, diarrhea, consistent, melena, or

hematochezia, abdominal pain, or hematemesis.

GENITOURINARY: Without dysuria, hematuria, or history of renal stones.

ENDOCRINE: With diabetes. Without thyroid disease. 

NEUROLOGIC: Without paresthesias, paralyses, or prior seizures.

PSYCHIATRIC: Without pathologic anxieties, depressions, or psychoses. 



PHYSICAL EXAMINATION: A cachectic, well-developed, under-nourished white male

in acute distress with shortness of breath, unable to complete full sentences.

He is using the accessory muscles or respiration in both his neck and his

abdomen. Vital signs: Temperature is 97.0, heart rate is 79 in a sinus rhythm,

respiratory rate of 30 with the use of accessory muscles, who is 99% saturated

on 4 liters nasal cannula and whose blood pressure is 113/66. Eyes: Pupils

equally round and reactive to light. Extraocular movements intact. Sclerae

anicteric. Nose without deformity. Mouth shows his mucous membranes to be pink

and moist. Lips and commissures without lesions. There is no thrush. He is

edentulous. Neck is supple. There is no jugular venous distention. No

subcutaneous emphysema. Trachea is midline. He has 2+ carotid upstrokes. No

thyromegaly or lymphadenopathy. Lungs show markedly decreased breath sounds on

the right side, and he is hyperresonant on the right side. Left side shows

normal vesicular sounds. There are no wheezes, rhonchi, or rales. Percussion

note is full to the diaphragm on the left. Cardiac exam is without murmurs,

clicks, gallops, or rubs. I cannot feel his point of maximal impulse (PMI). S1

and S2 are normal. He is so cachectic that I cannot get my stethoscope to make

full contact with the skin. Abdomen is soft, nontender. Bowel sounds are

positive.  There is no hepatomegaly, no costovertebral angle (CVA) tenderness.

Extremities show no pretibial edema, no calf tenderness, no differential

swelling of the upper extremities. Skin is warm, dry, and perfused without

cyanosis or mottling, including that of the nailbeds and knees. Neurologic

shows II-XII intact. Normal gross motor, gross sensation intact. Gait is not

tested. Psychiatric shows him to be awake, alert, and oriented times three with

appropriate mood and affect and conversational.



His white count today is 7.0 with a hemoglobin and hematocrit of 18.0 and 57.4.

Platelet count is 200 with a differential that shows 90% neutrophils, 1%

lymphocytes, and 2% monocytes. There are no immature forms or toxic

granulations.



His electrolytes are normal with a marginally high potassium of 5.2. BUN and

creatinine are 37 and 1.07 with a glucose of 361 and a calcium of 8.8. Total

bilirubin is 1.1 with an AST and ALT of 50 and 94, respectively. Troponin is

0.10. Calcium is 8.8 with a corresponding albumin of 3.9. TSH is 0.52. 



His blood gases this afternoon show a pH of 7.37, pCO2 of 45, and pO2 of 78 on

the above nasal cannula with a base excess of 0.3. 



His chest x-ray shows a near 75% pneumothorax. There is a shift to the left.

There is underlying compression from the pneumothorax and compression of the

pulmonary vessels. There is no lateral. This is a  portable chest x-ray done in

the emergency room (ER).



IMPRESSION: 

1. Acute pneumothorax.

2. End-stage pulmonary fibrosis.

3. Hypoxic respiratory failure.

4. Diabetes. 

5. Systemic lupus erythematosus.

6. Osteoarthritis. 

7. Chronic back and shoulder pain.



PLAN AND DISCUSSION: I will place chest tube with conscious sedation. That

should take care of the acute situation, although he is always going to be

short of breath. From Dr. Benitez' office notes, I understand he is not

interested in antifibrotic therapy, nor is he interested in lung transplant

referral and evaluation. His latest pulmonary function tests done on 02/02/2021

show an FEV1 of 1.04, which is 28% of predicted with a diffusion capacity which

was 69% of predicted.

## 2021-03-08 NOTE — IPN
PROGRESS NOTE



DATE:  03/08/2021



SUBJECTIVE: Nixon is seen on [facility], his chest tube is out today and he is

feeling well. We are starting to think about disposition, he cannot go home, he

has to climb stairs to get in and out of where he lives and he is no longer

able to do that. He has chronic hypoxic and respiratory failure due to the

pulmonary fibrosis. He uses home O2 at 2 liters. He has cachexia related to

endstage lung disease. He has a BMI of 18.7 and has temporal wasting and

sarcopenia. 



OBJECTIVE: 

VITAL SIGNS: Afebrile. O2 saturation is 96% on 3 liters, blood pressure is

100/61.

GENERAL APPEARANCE: Chronically ill-appearing, bitemporal wasting, alert,

conversant, hard of hearing. 

HEENT: Unremarkable.

LUNGS: Fibrotic rales bilaterally. Chest tube has been removed just before exam.

HEART: Regular rate and rhythm.

ABDOMEN: Soft, nontender. No masses. 

EXTREMITIES: No peripheral edema.



LABORATORY DATA: White count 15.9, hemoglobin 16.2, platelets are 130,000,

sodium is 142, potassium is 4.7, BUN 38, creatinine is 0.7, glucose is 112.

Blood sugars are around 100 to 200. 



IMPRESSION: 

  1.  Spontaneous pneumothorax right lung, chest tube has been removed, Dr. Hay is managing this, follow-up chest x-ray is pending. He has a DNR

      status. 

  2.  Diabetes. He had some hypoglycemia earlier in his hospitalization. He is

      on a fairly hefty dose of steroids for his lupus and lung disease. Insulin

      requirements have been between 6 and 12 units a day. I put him on some

      basal insulin with Detemir insulin 6 units at bedtime, continue sliding

      scale. If he becomes hypoglycemic I would just stop the basal insulin, the

      diabetes is not going to be a long-term problem for this gentleman's

      health whose life expectancy is severely limited by his endstage lung

      disease.  

  3.  Lupus, he has lupus, his rheumatologist is in [place]. He is on high-dose

      steroids for this. 

  4.  Disposition: He is not able to go home and [facility] has been working on

      placement. His life expectancy is probably more than six months so I do

      not think he is a Hospice candidate. His pulmonologist is Dr. Benitez. She

      has a long relationship with him and in the few days that I have seen Mr. Christine might provide some more insight into prognosis

## 2021-03-09 VITALS — DIASTOLIC BLOOD PRESSURE: 54 MMHG | SYSTOLIC BLOOD PRESSURE: 97 MMHG

## 2021-03-09 VITALS — SYSTOLIC BLOOD PRESSURE: 95 MMHG | DIASTOLIC BLOOD PRESSURE: 57 MMHG

## 2021-03-09 VITALS — DIASTOLIC BLOOD PRESSURE: 65 MMHG | SYSTOLIC BLOOD PRESSURE: 105 MMHG

## 2021-03-09 VITALS — DIASTOLIC BLOOD PRESSURE: 56 MMHG | SYSTOLIC BLOOD PRESSURE: 106 MMHG

## 2021-03-09 VITALS — SYSTOLIC BLOOD PRESSURE: 107 MMHG | DIASTOLIC BLOOD PRESSURE: 66 MMHG

## 2021-03-09 VITALS — SYSTOLIC BLOOD PRESSURE: 92 MMHG | DIASTOLIC BLOOD PRESSURE: 54 MMHG

## 2021-03-09 LAB
BASOPHILS # BLD AUTO: 0.1 10^3/UL (ref 0–0.2)
BASOPHILS NFR BLD AUTO: 0.5 % (ref 0–1)
BUN SERPL-MCNC: 38 MG/DL (ref 7–18)
CALCIUM SERPL-MCNC: 8.2 MG/DL (ref 8.8–10.2)
CHLORIDE SERPL-SCNC: 107 MEQ/L (ref 98–107)
CO2 SERPL-SCNC: 38 MEQ/L (ref 21–32)
CREAT SERPL-MCNC: 0.77 MG/DL (ref 0.7–1.3)
EOSINOPHIL # BLD AUTO: 0.1 10^3/UL (ref 0–0.5)
EOSINOPHIL NFR BLD AUTO: 0.5 % (ref 0–3)
GFR SERPL CREATININE-BSD FRML MDRD: > 60 ML/MIN/{1.73_M2} (ref 49–?)
GLUCOSE SERPL-MCNC: 124 MG/DL (ref 70–100)
HCT VFR BLD AUTO: 51.9 % (ref 42–52)
HGB BLD-MCNC: 15.8 G/DL (ref 13.5–17.5)
LYMPHOCYTES # BLD AUTO: 0.5 10^3/UL (ref 1.5–5)
LYMPHOCYTES NFR BLD AUTO: 3.4 % (ref 24–44)
MAGNESIUM SERPL-MCNC: 2.3 MG/DL (ref 1.8–2.4)
MCH RBC QN AUTO: 29.2 PG (ref 27–33)
MCHC RBC AUTO-ENTMCNC: 30.4 G/DL (ref 32–36.5)
MCV RBC AUTO: 95.8 FL (ref 80–96)
MONOCYTES # BLD AUTO: 0.9 10^3/UL (ref 0–0.8)
MONOCYTES NFR BLD AUTO: 5.9 % (ref 2–8)
NEUTROPHILS # BLD AUTO: 13.4 10^3/UL (ref 1.5–8.5)
NEUTROPHILS NFR BLD AUTO: 87.3 % (ref 36–66)
PLATELET # BLD AUTO: 123 10^3/UL (ref 150–450)
POTASSIUM SERPL-SCNC: 4.5 MEQ/L (ref 3.5–5.1)
RBC # BLD AUTO: 5.42 10^6/UL (ref 4.3–6.1)
SODIUM SERPL-SCNC: 142 MEQ/L (ref 136–145)
WBC # BLD AUTO: 15.4 10^3/UL (ref 4–10)

## 2021-03-09 RX ADMIN — INSULIN LISPRO SCH UNITS: 100 INJECTION, SOLUTION INTRAVENOUS; SUBCUTANEOUS at 20:37

## 2021-03-09 RX ADMIN — SODIUM CHLORIDE SCH UNITS: 4.5 INJECTION, SOLUTION INTRAVENOUS at 20:38

## 2021-03-09 RX ADMIN — SODIUM CHLORIDE SCH UNITS: 4.5 INJECTION, SOLUTION INTRAVENOUS at 08:24

## 2021-03-09 RX ADMIN — KETOROLAC TROMETHAMINE SCH MG: 30 INJECTION, SOLUTION INTRAMUSCULAR at 01:08

## 2021-03-09 RX ADMIN — INSULIN LISPRO SCH UNITS: 100 INJECTION, SOLUTION INTRAVENOUS; SUBCUTANEOUS at 08:25

## 2021-03-09 RX ADMIN — LEVALBUTEROL HYDROCHLORIDE SCH MG: 1.25 SOLUTION, CONCENTRATE RESPIRATORY (INHALATION) at 15:01

## 2021-03-09 RX ADMIN — KETOROLAC TROMETHAMINE SCH MG: 30 INJECTION, SOLUTION INTRAMUSCULAR at 12:23

## 2021-03-09 RX ADMIN — LEVALBUTEROL HYDROCHLORIDE SCH MG: 1.25 SOLUTION, CONCENTRATE RESPIRATORY (INHALATION) at 07:05

## 2021-03-09 RX ADMIN — KETOROLAC TROMETHAMINE SCH MG: 30 INJECTION, SOLUTION INTRAMUSCULAR at 17:17

## 2021-03-09 RX ADMIN — DOCUSATE SODIUM SCH MG: 100 CAPSULE, LIQUID FILLED ORAL at 08:25

## 2021-03-09 RX ADMIN — INSULIN DETEMIR SCH UNITS: 100 INJECTION, SOLUTION SUBCUTANEOUS at 20:37

## 2021-03-09 RX ADMIN — ATOVAQUONE SCH MG: 750 SUSPENSION ORAL at 08:24

## 2021-03-09 RX ADMIN — INSULIN LISPRO SCH UNITS: 100 INJECTION, SOLUTION INTRAVENOUS; SUBCUTANEOUS at 12:24

## 2021-03-09 RX ADMIN — PANTOPRAZOLE SODIUM SCH MG: 40 TABLET, DELAYED RELEASE ORAL at 08:25

## 2021-03-09 RX ADMIN — LEVALBUTEROL HYDROCHLORIDE SCH MG: 1.25 SOLUTION, CONCENTRATE RESPIRATORY (INHALATION) at 20:13

## 2021-03-09 RX ADMIN — LEVALBUTEROL HYDROCHLORIDE SCH MG: 1.25 SOLUTION, CONCENTRATE RESPIRATORY (INHALATION) at 01:27

## 2021-03-09 RX ADMIN — INSULIN LISPRO SCH UNITS: 100 INJECTION, SOLUTION INTRAVENOUS; SUBCUTANEOUS at 17:18

## 2021-03-09 RX ADMIN — HYDROXYCHLOROQUINE SULFATE SCH MG: 200 TABLET, FILM COATED ENTERAL at 08:25

## 2021-03-09 RX ADMIN — HYDROXYCHLOROQUINE SULFATE SCH MG: 200 TABLET, FILM COATED ENTERAL at 20:38

## 2021-03-09 RX ADMIN — KETOROLAC TROMETHAMINE SCH MG: 30 INJECTION, SOLUTION INTRAMUSCULAR at 06:10

## 2021-03-09 RX ADMIN — DOCUSATE SODIUM SCH MG: 100 CAPSULE, LIQUID FILLED ORAL at 20:38

## 2021-03-09 RX ADMIN — MAGNESIUM HYDROXIDE SCH ML: 400 SUSPENSION ORAL at 08:26

## 2021-03-09 NOTE — REP
INDICATION:

after chest tube placement



COMPARISON:

03/08/2021



TECHNIQUE:

PA and lateral.



FINDINGS:

Right-sided chest tube has been removed and a small to moderate right apical

pneumothorax appears slightly increased from prior examination.  Overlying

subcutaneous emphysema is appreciated.  The lung fields demonstrate diffuse bilateral

chronic fibrosis and interstitial changes similar to prior examinations.  No obvious

new acute process identified.  Mediastinum and cardiac silhouette are stable.



IMPRESSION:

Right chest tube has been removed and the right apical pneumothorax along with

right-sided subcutaneous emphysema is slightly increased from prior examination.





<Electronically signed by Nixon Rojas > 03/09/21 0886

## 2021-03-09 NOTE — IPN
PROGRESS NOTE



DATE:  03/09/2021



Mr. Christine is feeling fairly well at his baseline. He did ambulate today. He is

still short of breath but comfortable at rest. His vital signs show a maximum

temperature of 97.8 with a heart rate that ranges between 60-78 in a sinus

rhythm, respiratory rate that is constant at 19, who is 92%-97% saturated on 2

liters nasal cannula and whose blood pressure is ranging between 97/54 to

92/54. 



PHYSICAL EXAMINATION: His left and right lung show inspiratory Velcro crackles.

The right lung shows more crackles starting at early inspiration and throughout

the entire inspiratory cycle. Left lung shows Velcro crackles starting at mid

inspiration. Percussion notes are full to the diaphragm. Cardiac exam is

without murmurs, clicks, gallops, or rubs.  I cannot feel his point of maximal

impulse (PMI). S1 and S2 are normal. Abdomen is soft and nontender. Bowel

sounds are positive. There is no hepatomegaly. No costovertebral angle (CVA)

tenderness. Extremities show no pretibial edema, no calf tenderness, no

differential swelling of the upper extremities. Skin is warm, dry, and perfused

without cyanosis or mottling, including that of the nailbeds and knees. Neck is

supple. There is no jugular venous distention. No subcutaneous emphysema.

Trachea is midline. Mouth shows the mucous membranes to be pink and moist. Lips

and commissures without lesions. No thrush. Eyes show his pupils to be equal

and reactive. Extraocular motion intact. Sclerae anicteric. Neurologic shows

II-XII intact. Normal gross motor, gross sensation intact. Gait is not tested.

Psychiatric shows him to be awake, alert, and oriented times three with

appropriate mood and affect and conversational.



His white count today is 15.4, unchanged from yesterday, with a hemoglobin and

hematocrit of 15.8 and 51.9, platelet count 123 and stable, and differential

shows 87% neutrophils, 3% lymphocytes, 5% monocytes. There are no immature

forms or toxic granulations.



His electrolytes are normal except for an elevated total CO2 of 38. BUN and

creatinine are 38 and 0.77 with a glucose of 124, a calcium 8.2, and a

magnesium 2.3. 



His chest x-ray today shows the lung slightly  from the chest wall

with subcutaneous emphysema at the base of the neck. I cannot palpate the

subcutaneous emphysema. The chest wall separation looks as though it is a

loculated separation, which measures 1.5 x 2 cm. Subcutaneous emphysema is new

from yesterday. Costophrenic angles are sharp. He has the underlying fibrotic

changes consistent with his idiopathic pulmonary fibrosis. 



IMPRESSION: 

1. Spontaneous pneumothorax, right side.

2. Advanced interstitial lung disease, clinically idiopathic pulmonary fibrosis.

3. Underlying chronic obstructive pulmonary disease.

4. Hypoxic polycythemia.

5. Gastroesophageal reflux disease.

6. Diabetes.



PLAN AND DISCUSSION: I was hoping that we could send him home or to another

care facility; however, with the small airspace and most significantly the

subcutaneous emphysema seen on chest x-ray, we will need to observe him a few

more days with continuing chest x-ray. I noted before that I had a sinking

feeling that his pneumothoraces would recur because of his advanced cystic

disease.

## 2021-03-09 NOTE — IPN
PROGRESS NOTE



DATE:  03/09/2021 



SUBJECTIVE: Mr. Christine is followed in my office for interstitial lung disease

with a differential of idiopathic pulmonary fibrosis versus nonspecific

interstitial pneumonia (NSIP).  The patient does have a history of lupus

followed by Dr. Quinonez.  Based on outpatient evaluation, we have decided on a

trial of Cytoxan with the patient. The question was whether or not that could

be started in the hospital. Upon interviewing the patient, he feels very weak,

is looking forward to starting physical therapy to gain strength and he states

that he would not want to start therapy until this is at least initiated if not

finished.  He remains on 60 mg of Prednisone. He has had no fevers or chills or

a productive cough but has had shortness of breath which is improved since the

treatment of his pneumothorax.  This morning there is still a persistent right

apical pneumothorax despite removal of the chest tube.  He has no evidence of

decompensation so this is being carefully monitored.  



OBJECTIVE:  

Vital signs:  Temperature is 96.9, pulse is 60, respiratory rate is 19, blood

pressure is 97/54 with a mean arterial pressure of 68 with an oxygen saturation

97% on 2 to 3 liters.

General:  Awake, alert and oriented.  Affect and mood are appropriate.

Nutrition and hygiene are fair. He has some muscle wasting. 

HEENT: Sclerae are clear, nonicteric. Pupils are equal and reactive to light.

Mucous membranes are moist without lesions. Tongue is midline.  

Neck is supple. No jugular deviation or mass.

Lymph: No cervical, supraclavicular, or axillary adenopathy. 

Chest wall: There is a covered right anterior chest wall skin lesion from

recent chest tube. No significant crepitus.  Decreased breath sounds throughout

both lung fields with rales.  No expiratory wheeze.  No dullness to percussion. 

Abdomen:  Obese, soft and nondistended. No hepatosplenomegaly. No masses or

hernia.

Extremities:  No cyanosis or edema.

Skin: Pale without rash. Some minimal desquamation of the skin.  



Laboratory evaluation shows a white blood cell count of 15.4 down from 17.0,

hemoglobin of 15.8 with a platelet count of 123 with 87% neutrophilia.   Sodium

142, potassium 4.5, chloride 107, bicarb 38 which is up from 33 yesterday. 

Fasting glucose of 124 with a calcium of 8.2.  Arterial blood gas was last done

on 03/06 showing a hypercarbic respiratory failure with pCO2 of 7.28, PO2 of 64

and a PaO2 of 214.  



Chest x-ray today shows a small apical right pneumothorax with removal of the

chest tube. Chest CT from 03/06/2021 shows diffuse pleural base pulmonary

fibrosis with bronchiectasis, some nonspecific patchy opacities without dense

consolidation. No evidence of effusion.  There is some cardiomegaly without

pericardial effusion. Mild reactive adenopathy without significant lymph node

enlargement and a pneumatocele at the right lung base and a right apical chest

tube. 



ASSESSMENT AND PLAN: A 65-year-old male with interstitial lung disease. 

Differential including NSIP versus IPF. Because of his underlying history of

lupus and suspicion of inflammatory component to his lung disease, Dr. Quinonez

has been in contact with me in regards to initiating Cytoxan.  At this point in

time the patient wishes to wait until after physical therapy.  He states he is

actually feeling somewhat better.  I think it is reasonable to allow further

healing, physical therapy before starting Cytoxan therapy.  I will contact his

outside rheumatologist to discuss this again. I will be happy to facilitate

treatment in the near future.

LENORA

## 2021-03-09 NOTE — IPNPDOC
Subjective


Date Seen


The patient was seen on 3/9/21.





Subjective


Chief Complaint/HPI


Mr. Christine is a 65 year old male with pulmonary interstitial fibrosis here with 

spontaneous pneumothorax of the right lung. He denies any chest pain, but still 

has some dyspnea. Otherwise, spoke with Dr. Benitez. Will hold off on Cytoxan 

until he has recovered. Dr. Hay evaluated the patient today, and the patient

may need a few more days inpatient with the patient.





Objective


Physical Examination


General Exam:  Positive: Cooperative, Other (Fatigued)


Eye Exam:  Positive: EOMI; 


   Negative: Sclera icteric


ENT Exam:  Positive: Atraumatic


Chest Exam:  Positive: Diminished (on right lung)


Heart Exam:  Positive: Rate Normal, Regular Rhythm


Abdomen Exam:  Positive: Normal bowel sounds, Soft; 


   Negative: Tenderness


Extremity Exam:  Negative: Edema


Neuro Exam:  Positive: Normal Speech


Psych Exam:  Positive: Mental status NL





Assessment /Plan


Assessment


Mr. Christine is a 65 year old male with pulmonary interstitial fibrosis here with 

spontaneous pneumothorax of the right lung. Dr. Hay placed chest tube on 

3/5/2021 and removed chest tube on 3/8/2021. Patient will need placement when 

medically clear. Patient will need a few more days before becoming medically 

clear.





Plan/VTE


VTE Prophylaxis Ordered?:  Yes





Plan


1. Spontaneous pneumothorax of right lung


-Followed by Dr. Hay


-Placed chest tube on 3/5/2021 and removed chest tube on 3/8/2021


-Per Dr. Hay, patient will need a few more days before becoming medically 

clear





2. Diabetes mellitus


-Sliding scale insulin and basal insulin


-POC glucose increasing, will increase basal insulin from 6units qHS to 8units 

qHS





3. Lupus


-Patient was scheduled for Cytoxan on 3/11/2021 per patient's Rheumatologist 

office in [place], [place]


-Dr. Benitez spoke with patient, patient request to defer Cytoxan until he has 

recovered


-Continue with prednisone and Plaquenil





4. GERD


-Continue pantoprazole





5.  DVT ppx


-Heparin subQ





Disposition: Pending placement. Patient will need a few more days before 

becoming medically clear.





VS, I&O, 24H, Fishbone


Vital Signs/I&O





Vital Signs








  Date Time  Temp Pulse Resp B/P (MAP) Pulse Ox O2 Delivery O2 Flow Rate FiO2


 


3/9/21 16:00 97.4 100 20 106/56 (73) 94 Nasal Cannula 3.0 














I&O- Last 24 Hours up to 6 AM 


 


 3/9/21





 06:00


 


Intake Total 1640 ml


 


Output Total 0 ml


 


Balance 1640 ml











Laboratory Data


24H LABS


Laboratory Tests 2


3/8/21 20:39: Bedside Glucose (Misc Panel) 350H


3/9/21 04:49: 


Immature Granulocyte % (Auto) 2.4, Neutrophils (%) (Auto) 87.3H, Lymphocytes (%)

(Auto) 3.4L, Monocytes (%) (Auto) 5.9, Eosinophils (%) (Auto) 0.5, Basophils (%)

(Auto) 0.5, Neutrophils # (Auto) 13.4H, Lymphocytes # (Auto) 0.5L, Monocytes # 

(Auto) 0.9H, Eosinophils # (Auto) 0.1, Basophils # (Auto) 0.1, Nucleated Red 

Blood Cells % (auto) 0.0, Anion Gap , Glomerular Filtration Rate > 60.0, Calcium

Level 8.2L, Magnesium Level 2.3


3/9/21 11:36: Bedside Glucose (Misc Panel) 200H


3/9/21 16:51: Bedside Glucose (Misc Panel) 431H


CBC/BMP


Laboratory Tests


3/9/21 04:49








Microbiology





Microbiology


3/5/21 Respiratory Virus Panel (PCR) ([facility]) - Final, Complete











TEO JACKSON 9, 2021 19:26

## 2021-03-09 NOTE — IPN
PROGRESS NOTE



DATE:  03/08/2021



SUBJECTIVE: I spoke with Dr. Sha Benitez today about Mr. Christine. She has been in

communication with his rheumatologist who has been hoping to start

Cyclophosphamide for treatment of his lupus. Dr. Benitez is willing to write for

this. It is not a medication that I think I am credentialed to order but she

has that credential so I will put a consultation in, she will discuss this with

Mr. Christine. 



I did talk about advance directives a bit with Dr. Benitez who really has only

seen the patient once previously. She did not feel the patient had a less than

six month life expectancy either, referable to our discussion about possible

Hospice.

## 2021-03-10 VITALS — DIASTOLIC BLOOD PRESSURE: 54 MMHG | SYSTOLIC BLOOD PRESSURE: 89 MMHG

## 2021-03-10 VITALS — SYSTOLIC BLOOD PRESSURE: 99 MMHG | DIASTOLIC BLOOD PRESSURE: 65 MMHG

## 2021-03-10 VITALS — DIASTOLIC BLOOD PRESSURE: 63 MMHG | SYSTOLIC BLOOD PRESSURE: 93 MMHG

## 2021-03-10 VITALS — SYSTOLIC BLOOD PRESSURE: 111 MMHG | DIASTOLIC BLOOD PRESSURE: 62 MMHG

## 2021-03-10 VITALS — SYSTOLIC BLOOD PRESSURE: 100 MMHG | DIASTOLIC BLOOD PRESSURE: 64 MMHG

## 2021-03-10 VITALS — SYSTOLIC BLOOD PRESSURE: 105 MMHG | DIASTOLIC BLOOD PRESSURE: 64 MMHG

## 2021-03-10 VITALS — SYSTOLIC BLOOD PRESSURE: 95 MMHG | DIASTOLIC BLOOD PRESSURE: 60 MMHG

## 2021-03-10 VITALS — DIASTOLIC BLOOD PRESSURE: 60 MMHG | SYSTOLIC BLOOD PRESSURE: 100 MMHG

## 2021-03-10 VITALS — DIASTOLIC BLOOD PRESSURE: 59 MMHG | SYSTOLIC BLOOD PRESSURE: 99 MMHG

## 2021-03-10 VITALS — DIASTOLIC BLOOD PRESSURE: 57 MMHG | SYSTOLIC BLOOD PRESSURE: 89 MMHG

## 2021-03-10 VITALS — DIASTOLIC BLOOD PRESSURE: 59 MMHG | SYSTOLIC BLOOD PRESSURE: 95 MMHG

## 2021-03-10 VITALS — DIASTOLIC BLOOD PRESSURE: 62 MMHG | SYSTOLIC BLOOD PRESSURE: 99 MMHG

## 2021-03-10 VITALS — SYSTOLIC BLOOD PRESSURE: 90 MMHG | DIASTOLIC BLOOD PRESSURE: 57 MMHG

## 2021-03-10 VITALS — SYSTOLIC BLOOD PRESSURE: 94 MMHG | DIASTOLIC BLOOD PRESSURE: 62 MMHG

## 2021-03-10 VITALS — DIASTOLIC BLOOD PRESSURE: 63 MMHG | SYSTOLIC BLOOD PRESSURE: 103 MMHG

## 2021-03-10 VITALS — DIASTOLIC BLOOD PRESSURE: 66 MMHG | SYSTOLIC BLOOD PRESSURE: 109 MMHG

## 2021-03-10 VITALS — DIASTOLIC BLOOD PRESSURE: 59 MMHG | SYSTOLIC BLOOD PRESSURE: 94 MMHG

## 2021-03-10 VITALS — SYSTOLIC BLOOD PRESSURE: 91 MMHG | DIASTOLIC BLOOD PRESSURE: 58 MMHG

## 2021-03-10 VITALS — SYSTOLIC BLOOD PRESSURE: 92 MMHG | DIASTOLIC BLOOD PRESSURE: 58 MMHG

## 2021-03-10 VITALS — SYSTOLIC BLOOD PRESSURE: 107 MMHG | DIASTOLIC BLOOD PRESSURE: 67 MMHG

## 2021-03-10 VITALS — SYSTOLIC BLOOD PRESSURE: 95 MMHG | DIASTOLIC BLOOD PRESSURE: 57 MMHG

## 2021-03-10 VITALS — SYSTOLIC BLOOD PRESSURE: 106 MMHG | DIASTOLIC BLOOD PRESSURE: 65 MMHG

## 2021-03-10 VITALS — SYSTOLIC BLOOD PRESSURE: 96 MMHG | DIASTOLIC BLOOD PRESSURE: 60 MMHG

## 2021-03-10 VITALS — DIASTOLIC BLOOD PRESSURE: 61 MMHG | SYSTOLIC BLOOD PRESSURE: 98 MMHG

## 2021-03-10 VITALS — SYSTOLIC BLOOD PRESSURE: 96 MMHG | DIASTOLIC BLOOD PRESSURE: 63 MMHG

## 2021-03-10 VITALS — DIASTOLIC BLOOD PRESSURE: 61 MMHG | SYSTOLIC BLOOD PRESSURE: 100 MMHG

## 2021-03-10 VITALS — SYSTOLIC BLOOD PRESSURE: 114 MMHG | DIASTOLIC BLOOD PRESSURE: 68 MMHG

## 2021-03-10 LAB
BASOPHILS # BLD AUTO: 0.1 10^3/UL (ref 0–0.2)
BASOPHILS NFR BLD AUTO: 0.6 % (ref 0–1)
BUN SERPL-MCNC: 35 MG/DL (ref 7–18)
CALCIUM SERPL-MCNC: 7.8 MG/DL (ref 8.8–10.2)
CHLORIDE SERPL-SCNC: 110 MEQ/L (ref 98–107)
CO2 SERPL-SCNC: 33 MEQ/L (ref 21–32)
CREAT SERPL-MCNC: 0.59 MG/DL (ref 0.7–1.3)
EOSINOPHIL # BLD AUTO: 0.1 10^3/UL (ref 0–0.5)
EOSINOPHIL NFR BLD AUTO: 0.6 % (ref 0–3)
GFR SERPL CREATININE-BSD FRML MDRD: > 60 ML/MIN/{1.73_M2} (ref 49–?)
GLUCOSE SERPL-MCNC: 172 MG/DL (ref 70–100)
HCT VFR BLD AUTO: 48.9 % (ref 42–52)
HGB BLD-MCNC: 14.8 G/DL (ref 13.5–17.5)
LYMPHOCYTES # BLD AUTO: 0.8 10^3/UL (ref 1.5–5)
LYMPHOCYTES NFR BLD AUTO: 6.7 % (ref 24–44)
MAGNESIUM SERPL-MCNC: 2.1 MG/DL (ref 1.8–2.4)
MCH RBC QN AUTO: 29 PG (ref 27–33)
MCHC RBC AUTO-ENTMCNC: 30.3 G/DL (ref 32–36.5)
MCV RBC AUTO: 95.7 FL (ref 80–96)
MONOCYTES # BLD AUTO: 0.7 10^3/UL (ref 0–0.8)
MONOCYTES NFR BLD AUTO: 5.9 % (ref 2–8)
NEUTROPHILS # BLD AUTO: 9.7 10^3/UL (ref 1.5–8.5)
NEUTROPHILS NFR BLD AUTO: 83.6 % (ref 36–66)
PLATELET # BLD AUTO: 111 10^3/UL (ref 150–450)
POTASSIUM SERPL-SCNC: 4.6 MEQ/L (ref 3.5–5.1)
RBC # BLD AUTO: 5.11 10^6/UL (ref 4.3–6.1)
SODIUM SERPL-SCNC: 144 MEQ/L (ref 136–145)
WBC # BLD AUTO: 11.6 10^3/UL (ref 4–10)

## 2021-03-10 PROCEDURE — 0W9930Z DRAINAGE OF RIGHT PLEURAL CAVITY WITH DRAINAGE DEVICE, PERCUTANEOUS APPROACH: ICD-10-PCS | Performed by: THORACIC SURGERY (CARDIOTHORACIC VASCULAR SURGERY)

## 2021-03-10 RX ADMIN — DOCUSATE SODIUM SCH MG: 100 CAPSULE, LIQUID FILLED ORAL at 20:55

## 2021-03-10 RX ADMIN — KETOROLAC TROMETHAMINE SCH MG: 30 INJECTION, SOLUTION INTRAMUSCULAR at 12:53

## 2021-03-10 RX ADMIN — INSULIN LISPRO SCH UNITS: 100 INJECTION, SOLUTION INTRAVENOUS; SUBCUTANEOUS at 20:56

## 2021-03-10 RX ADMIN — LEVALBUTEROL HYDROCHLORIDE SCH MG: 1.25 SOLUTION, CONCENTRATE RESPIRATORY (INHALATION) at 01:24

## 2021-03-10 RX ADMIN — INSULIN LISPRO SCH UNITS: 100 INJECTION, SOLUTION INTRAVENOUS; SUBCUTANEOUS at 09:06

## 2021-03-10 RX ADMIN — INSULIN LISPRO SCH UNITS: 100 INJECTION, SOLUTION INTRAVENOUS; SUBCUTANEOUS at 17:41

## 2021-03-10 RX ADMIN — DOCUSATE SODIUM SCH MG: 100 CAPSULE, LIQUID FILLED ORAL at 20:26

## 2021-03-10 RX ADMIN — SODIUM CHLORIDE SCH UNITS: 4.5 INJECTION, SOLUTION INTRAVENOUS at 20:25

## 2021-03-10 RX ADMIN — PANTOPRAZOLE SODIUM SCH MG: 40 TABLET, DELAYED RELEASE ORAL at 09:04

## 2021-03-10 RX ADMIN — ATOVAQUONE SCH MG: 750 SUSPENSION ORAL at 09:04

## 2021-03-10 RX ADMIN — HYDROXYCHLOROQUINE SULFATE SCH MG: 200 TABLET, FILM COATED ENTERAL at 09:04

## 2021-03-10 RX ADMIN — SODIUM CHLORIDE SCH UNITS: 4.5 INJECTION, SOLUTION INTRAVENOUS at 12:42

## 2021-03-10 RX ADMIN — LEVALBUTEROL HYDROCHLORIDE SCH MG: 1.25 SOLUTION, CONCENTRATE RESPIRATORY (INHALATION) at 14:35

## 2021-03-10 RX ADMIN — HYDROXYCHLOROQUINE SULFATE SCH MG: 200 TABLET, FILM COATED ENTERAL at 20:33

## 2021-03-10 RX ADMIN — KETOROLAC TROMETHAMINE SCH MG: 30 INJECTION, SOLUTION INTRAMUSCULAR at 00:59

## 2021-03-10 RX ADMIN — LEVALBUTEROL HYDROCHLORIDE SCH MG: 1.25 SOLUTION, CONCENTRATE RESPIRATORY (INHALATION) at 19:58

## 2021-03-10 RX ADMIN — MAGNESIUM HYDROXIDE SCH ML: 400 SUSPENSION ORAL at 09:04

## 2021-03-10 RX ADMIN — DOCUSATE SODIUM SCH MG: 100 CAPSULE, LIQUID FILLED ORAL at 09:04

## 2021-03-10 RX ADMIN — INSULIN LISPRO SCH UNITS: 100 INJECTION, SOLUTION INTRAVENOUS; SUBCUTANEOUS at 13:02

## 2021-03-10 RX ADMIN — KETOROLAC TROMETHAMINE SCH MG: 30 INJECTION, SOLUTION INTRAMUSCULAR at 06:03

## 2021-03-10 RX ADMIN — INSULIN DETEMIR SCH UNITS: 100 INJECTION, SOLUTION SUBCUTANEOUS at 20:25

## 2021-03-10 RX ADMIN — LEVALBUTEROL HYDROCHLORIDE SCH MG: 1.25 SOLUTION, CONCENTRATE RESPIRATORY (INHALATION) at 07:05

## 2021-03-10 NOTE — IPN
PROGRESS NOTE



DATE:  03/10/2021



Mr. Christine is more short of breath today, although he did ambulate today. His

chest x-ray, however, shows that his lung is now collapsed even more, about

where it was on admission with a 50%-60% pneumothorax. There is increased

subcutaneous emphysema at the base of the neck and the lateral chest wall.



His vital signs show a maximum temperature of 97.6 with a heart rate that

ranges between 82-91 in a sinus rhythm, respiratory rate of 17-18 without the

use of accessory muscles, who is 96%-99% saturated on 3 liters nasal cannula

and whose blood pressure is ranging between 99/65 to 106/65.



His intake and output for the past 24 hours has been recorded as 2040 in and

850 out, for a positivity of 1190 mL. This now makes him 10 liters positive

since 03/06/2021. His weight today is 67.7 kg compared to 66.7 kg, and 63.1 kg

on admission. 



On physical examination he has markedly decreased breath sounds on the right

side. I can hear fine inspiratory crackles on both sides, but are, of course,

better on the left than the right. Percussion note is full to the diaphragm.

Cardiac exam is without murmurs, clicks, gallops, or rubs.  I cannot feel his

point of maximal impulse (PMI). S1 and S2 are normal. Abdomen is soft and

nontender. Bowel sounds are positive. There is no hepatomegaly. No

costovertebral angle (CVA) tenderness. Extremities show no pretibial edema, no

calf tenderness, no differential swelling of the upper extremities. Skin is

warm, dry, and perfused without cyanosis or mottling, including that of the

nailbeds and knees. Neck is supple. There is no jugular venous distention.

There is subcutaneous emphysema at the base of the neck now. Trachea is

midline. Mouth shows the mucous membranes to be pink and moist. Lips and

commissures without lesions. No thrush. Eyes show his pupils to be equal and

reactive. Extraocular motion intact. Sclerae anicteric. Neurologic shows II-XII

intact. Normal gross motor, gross sensation intact. Gait is not tested.

Psychiatric shows him to be awake, alert, and oriented times three with

appropriate mood and affect and conversational.



His white count today is down to 11.6 with a hemoglobin and hematocrit of 14.8

and 48.9, now normalized. Platelet count is 111 and stable. Differential shows

83% neutrophils, 6% lymphocytes, 5% monocytes. There are no immature forms or

toxic granulations.



His electrolytes show a marginally increased total CO2 of 33, down from 38

yesterday. BUN and creatinine are 35 and 0.59. Glucose is 172 with a calcium of

7.8. Magnesium is 2.1. 



His chest x-ray is as described above, showing a 56% pneumothorax on the right. 



IMPRESSION: 

1. Spontaneous pneumothorax, right side, recurrent.

2. Advanced interstitial lung disease, clinically idiopathic pulmonary fibrosis.

3. Underlying chronic obstructive pulmonary disease.

4. Hypoxic polycythemia.

5. Gastroesophageal reflux disease.

6. Diabetes. 



PLAN AND DISCUSSION: Yesterday a chest x-ray showed the lungs with a minor

separation from the chest wall, however, today now is a full-blown pneumothorax

with subcutaneous emphysema. I am therefore compelled to yet place another

chest tube. Patient understands the findings and is willing to proceed. As I

have noted above, I have had the sinking feeling this is going to be a

recurrent problem because of his advanced pulmonary fibrosis with cystic

disease.

## 2021-03-10 NOTE — RO
OPERATIVE NOTE



DATE OF OPERATION: 03/10/2021



PREOPERATIVE DIAGNOSES: 

1. Recurrent pneumothorax.

2. End-stage pulmonary fibrosis.



POSTOPERATIVE DIAGNOSES:

1. Recurrent pneumothorax.

2. End-stage pulmonary fibrosis.



PROCEDURE: Insertion of right anterior-superior chest tube.



SURGEON: Dr. Cruz Hay



DESCRIPTION OF PROCEDURE: Under satisfactory moderate sedation achieved with 2

mg of Versed, patient was prepped and draped in the usual sterile fashion.

Incision was made over the 2nd rib and a tunnel created into the 1st

intercostal space. A #20 chest tube was placed without difficulty, and the tube

was secured to the chest wall with #2 Tevdek suture. It was connected to the

Pleur-evac. Patient tolerated the procedure well, and a chest x-ray is pending.

## 2021-03-10 NOTE — IPNPDOC
Subjective


Date Seen


The patient was seen on 3/10/21.





Subjective


Chief Complaint/HPI


Mr. Christine is a 65 year old male with pulmonary interstitial fibrosis here with 

spontaneous pneumothorax of the right lung. This morning, he was seen sitting up

eating breakfast. There was decreased breath sounds on the right. Dr. Hay 

re-evaluated patient today. CXR this morning demonstrated enlargening 

pneumothorax. Chest tube was reinserted.





Objective


Physical Examination


General Exam:  Positive: Cooperative, Other (Fatigued)


Eye Exam:  Positive: EOMI; 


   Negative: Sclera icteric


ENT Exam:  Positive: Atraumatic


Chest Exam:  Positive: Diminished (on right lung)


Heart Exam:  Positive: Rate Normal, Regular Rhythm


Abdomen Exam:  Positive: Normal bowel sounds, Soft; 


   Negative: Tenderness


Extremity Exam:  Negative: Edema


Neuro Exam:  Positive: Normal Speech


Psych Exam:  Positive: Mental status NL





Assessment /Plan


Assessment


Mr. Christine is a 65 year old male with pulmonary interstitial fibrosis here with 

spontaneous pneumothorax of the right lung. Dr. Hay placed chest tube on 

3/5/2021 and removed chest tube on 3/8/2021. After removal, started to have 

signs of growing pneumothorax. Chest tube reinserted on 3/10/2021.





Plan/VTE


VTE Prophylaxis Ordered?:  Yes





Plan


1. Spontaneous pneumothorax of right lung


-Followed by Dr. Hay


-Placed chest tube on 3/5/2021 and removed chest tube on 3/8/2021


-CXR started to demonstrate signs of growing right pneumothorax. Chest tube 

reinserted on 3/10/2021





2. Diabetes mellitus


-Sliding scale insulin and basal insulin


-On levemir 8units qHS (increased from 6units qHS)





3. Lupus


-Patient was scheduled for Cytoxan on 3/11/2021 per patient's Rheumatologist 

office in [place], [place]


-Dr. Benitez spoke with patient, patient request to defer Cytoxan until he has 

recovered


-Continue with prednisone and Plaquenil





4. GERD


-Continue pantoprazole





5.  DVT ppx


-Heparin subQ





Disposition: Pending clinical improvement. Chest tube reinserted. When patient 

is medically clear, patient will need placement.





VS, I&O, 24H, Fishbone


Vital Signs/I&O





Vital Signs








  Date Time  Temp Pulse Resp B/P (MAP) Pulse Ox O2 Delivery O2 Flow Rate FiO2


 


3/10/21 08:00 97.0 83 17 100/60 (73) 96 Nasal Cannula 3.0 














I&O- Last 24 Hours up to 6 AM 


 


 3/10/21





 06:00


 


Intake Total 2240 ml


 


Output Total 1400 ml


 


Balance 840 ml











Laboratory Data


24H LABS


Laboratory Tests 2


3/9/21 16:51: Bedside Glucose (Misc Panel) 431H


3/9/21 20:19: Bedside Glucose (Misc Panel) 408H


3/10/21 05:09: 


Immature Granulocyte % (Auto) 2.6, Neutrophils (%) (Auto) 83.6H, Lymphocytes (%)

(Auto) 6.7L, Monocytes (%) (Auto) 5.9, Eosinophils (%) (Auto) 0.6, Basophils (%)

(Auto) 0.6, Neutrophils # (Auto) 9.7H, Lymphocytes # (Auto) 0.8L, Monocytes # 

(Auto) 0.7, Eosinophils # (Auto) 0.1, Basophils # (Auto) 0.1, Nucleated Red 

Blood Cells % (auto) 0.0, Anion Gap 1L, Glomerular Filtration Rate > 60.0, 

Calcium Level 7.8L, Magnesium Level 2.1


CBC/BMP


Laboratory Tests


3/10/21 05:09








Microbiology





Microbiology


3/5/21 Respiratory Virus Panel (PCR) ([facility]) - Final, Complete











TEO JACKSON 10, 2021 13:42

## 2021-03-10 NOTE — REP
INDICATION:

after chest tube placement.



COMPARISON:

Comparison chest x-ray March 9, 2021 and March 8, 2021.



TECHNIQUE:

Two views..



FINDINGS:

The recurrent right-sided pneumothorax has increased in size considerably since

yesterday's study.  There is no shift of the mediastinum.  Air-fluid level is present

in the pleural angles on the right.  There is extra thoracic soft tissue emphysema

again noted on the right.  Diffuse interstitial lung disease is again noted.

Cardiomediastinal silhouette is unchanged.



IMPRESSION:

Increase in the size of the recurrent right-sided pneumothorax post chest tube

removal, now moderate.  There is a small amount of fluid in the pleural space on the

right as well..





<Electronically signed by Dmitri Adamson > 03/10/21 [id number]

## 2021-03-10 NOTE — REP
INDICATION:

post chest tube placement.



COMPARISON:

03/10/2021, 7:33 a.m..



TECHNIQUE:

SINGLE PORTABLE AP VIEW OF THE CHEST WAS PERFORMED.



FINDINGS:

The right chest tube has been placed.  The right pneumothorax appears to have

essentially resolved.  Air is noted in the soft tissues of the right chest wall.

There are diffuse reticulonodular densities again noted bilaterally.  The heart

mediastinum appear unchanged.



IMPRESSION:

Placement of right chest tube with resolution of right pneumothorax.





<Electronically signed by Richard Santizo > 03/10/21 [id number]

## 2021-03-11 VITALS — SYSTOLIC BLOOD PRESSURE: 98 MMHG | DIASTOLIC BLOOD PRESSURE: 58 MMHG

## 2021-03-11 VITALS — SYSTOLIC BLOOD PRESSURE: 103 MMHG | DIASTOLIC BLOOD PRESSURE: 59 MMHG

## 2021-03-11 VITALS — DIASTOLIC BLOOD PRESSURE: 61 MMHG | SYSTOLIC BLOOD PRESSURE: 107 MMHG

## 2021-03-11 VITALS — SYSTOLIC BLOOD PRESSURE: 104 MMHG | DIASTOLIC BLOOD PRESSURE: 63 MMHG

## 2021-03-11 VITALS — SYSTOLIC BLOOD PRESSURE: 104 MMHG | DIASTOLIC BLOOD PRESSURE: 61 MMHG

## 2021-03-11 VITALS — DIASTOLIC BLOOD PRESSURE: 73 MMHG | SYSTOLIC BLOOD PRESSURE: 113 MMHG

## 2021-03-11 LAB
BASOPHILS # BLD AUTO: 0.1 10^3/UL (ref 0–0.2)
BASOPHILS NFR BLD AUTO: 0.6 % (ref 0–1)
BUN SERPL-MCNC: 28 MG/DL (ref 7–18)
CALCIUM SERPL-MCNC: 8 MG/DL (ref 8.8–10.2)
CHLORIDE SERPL-SCNC: 107 MEQ/L (ref 98–107)
CO2 SERPL-SCNC: 34 MEQ/L (ref 21–32)
CREAT SERPL-MCNC: 0.52 MG/DL (ref 0.7–1.3)
EOSINOPHIL # BLD AUTO: 0.1 10^3/UL (ref 0–0.5)
EOSINOPHIL NFR BLD AUTO: 0.7 % (ref 0–3)
GFR SERPL CREATININE-BSD FRML MDRD: > 60 ML/MIN/{1.73_M2} (ref 49–?)
GLUCOSE SERPL-MCNC: 81 MG/DL (ref 70–100)
HCT VFR BLD AUTO: 48.2 % (ref 42–52)
HGB BLD-MCNC: 14.9 G/DL (ref 13.5–17.5)
LYMPHOCYTES # BLD AUTO: 1 10^3/UL (ref 1.5–5)
LYMPHOCYTES NFR BLD AUTO: 8 % (ref 24–44)
MAGNESIUM SERPL-MCNC: 2.1 MG/DL (ref 1.8–2.4)
MCH RBC QN AUTO: 29.4 PG (ref 27–33)
MCHC RBC AUTO-ENTMCNC: 30.9 G/DL (ref 32–36.5)
MCV RBC AUTO: 95.3 FL (ref 80–96)
MONOCYTES # BLD AUTO: 0.6 10^3/UL (ref 0–0.8)
MONOCYTES NFR BLD AUTO: 5.3 % (ref 2–8)
NEUTROPHILS # BLD AUTO: 10.2 10^3/UL (ref 1.5–8.5)
NEUTROPHILS NFR BLD AUTO: 83.2 % (ref 36–66)
PLATELET # BLD AUTO: 101 10^3/UL (ref 150–450)
POTASSIUM SERPL-SCNC: 4.4 MEQ/L (ref 3.5–5.1)
RBC # BLD AUTO: 5.06 10^6/UL (ref 4.3–6.1)
SODIUM SERPL-SCNC: 143 MEQ/L (ref 136–145)
WBC # BLD AUTO: 12.2 10^3/UL (ref 4–10)

## 2021-03-11 RX ADMIN — PANTOPRAZOLE SODIUM SCH MG: 40 TABLET, DELAYED RELEASE ORAL at 08:04

## 2021-03-11 RX ADMIN — HYDROXYCHLOROQUINE SULFATE SCH MG: 200 TABLET, FILM COATED ENTERAL at 21:13

## 2021-03-11 RX ADMIN — INSULIN LISPRO SCH UNITS: 100 INJECTION, SOLUTION INTRAVENOUS; SUBCUTANEOUS at 13:05

## 2021-03-11 RX ADMIN — INSULIN LISPRO SCH UNITS: 100 INJECTION, SOLUTION INTRAVENOUS; SUBCUTANEOUS at 08:04

## 2021-03-11 RX ADMIN — SODIUM CHLORIDE SCH UNITS: 4.5 INJECTION, SOLUTION INTRAVENOUS at 21:04

## 2021-03-11 RX ADMIN — HYDROXYCHLOROQUINE SULFATE SCH MG: 200 TABLET, FILM COATED ENTERAL at 08:04

## 2021-03-11 RX ADMIN — SODIUM CHLORIDE SCH UNITS: 4.5 INJECTION, SOLUTION INTRAVENOUS at 08:41

## 2021-03-11 RX ADMIN — LEVALBUTEROL HYDROCHLORIDE SCH MG: 1.25 SOLUTION, CONCENTRATE RESPIRATORY (INHALATION) at 13:50

## 2021-03-11 RX ADMIN — LEVALBUTEROL HYDROCHLORIDE SCH MG: 1.25 SOLUTION, CONCENTRATE RESPIRATORY (INHALATION) at 19:51

## 2021-03-11 RX ADMIN — INSULIN LISPRO SCH UNITS: 100 INJECTION, SOLUTION INTRAVENOUS; SUBCUTANEOUS at 21:05

## 2021-03-11 RX ADMIN — DOCUSATE SODIUM SCH MG: 100 CAPSULE, LIQUID FILLED ORAL at 21:00

## 2021-03-11 RX ADMIN — MAGNESIUM HYDROXIDE SCH ML: 400 SUSPENSION ORAL at 08:05

## 2021-03-11 RX ADMIN — LEVALBUTEROL HYDROCHLORIDE SCH MG: 1.25 SOLUTION, CONCENTRATE RESPIRATORY (INHALATION) at 01:01

## 2021-03-11 RX ADMIN — DOCUSATE SODIUM SCH MG: 100 CAPSULE, LIQUID FILLED ORAL at 08:04

## 2021-03-11 RX ADMIN — LEVALBUTEROL HYDROCHLORIDE SCH MG: 1.25 SOLUTION, CONCENTRATE RESPIRATORY (INHALATION) at 07:08

## 2021-03-11 RX ADMIN — LEVALBUTEROL HYDROCHLORIDE SCH MG: 1.25 SOLUTION, CONCENTRATE RESPIRATORY (INHALATION) at 23:17

## 2021-03-11 RX ADMIN — INSULIN DETEMIR SCH UNITS: 100 INJECTION, SOLUTION SUBCUTANEOUS at 21:05

## 2021-03-11 RX ADMIN — INSULIN LISPRO SCH UNITS: 100 INJECTION, SOLUTION INTRAVENOUS; SUBCUTANEOUS at 18:36

## 2021-03-11 RX ADMIN — ATOVAQUONE SCH MG: 750 SUSPENSION ORAL at 08:02

## 2021-03-11 NOTE — IPNPDOC
Subjective


Date Seen


The patient was seen on 3/11/21.





Subjective


Chief Complaint/HPI


Mr. Christine is a 65 year old male with pulmonary interstitial fibrosis here with 

spontaneous pneumothorax of the right lung. He was seen this morning lying in 

bed with chest tubes in place. His breathing is about the same. Denies any chest

pain.





Objective


Physical Examination


General Exam:  Positive: Cooperative, Other (Fatigued)


Eye Exam:  Positive: EOMI; 


   Negative: Sclera icteric


ENT Exam:  Positive: Atraumatic


Chest Exam:  Positive: Diminished (on right lung)


Heart Exam:  Positive: Rate Normal, Regular Rhythm


Abdomen Exam:  Positive: Normal bowel sounds, Soft; 


   Negative: Tenderness


Extremity Exam:  Negative: Edema


Neuro Exam:  Positive: Normal Speech


Psych Exam:  Positive: Mental status NL





Assessment /Plan


Assessment


Mr. Christine is a 65 year old male with pulmonary interstitial fibrosis here with 

spontaneous pneumothorax of the right lung. Dr. Hay placed chest tube on 

3/5/2021 and removed chest tube on 3/8/2021. After removal, started to have 

signs of growing pneumothorax. Chest tube reinserted on 3/10/2021.





Plan/VTE


VTE Prophylaxis Ordered?:  Yes





Plan


1. Spontaneous pneumothorax of right lung


-Followed by Dr. Hay


-Placed chest tube on 3/5/2021 and removed chest tube on 3/8/2021


-CXR started to demonstrate signs of growing right pneumothorax. Chest tube 

reinserted on 3/10/2021





2. Diabetes mellitus


-Sliding scale insulin and basal insulin


-On levemir 8units qHS (increased from 6units qHS)





3. Lupus


-Patient was scheduled for Cytoxan on 3/11/2021 per patient's Rheumatologist 

office in [place], [place]


-Dr. Benitez spoke with patient, patient request to defer Cytoxan until he has 

recovered


-Continue with prednisone and Plaquenil


Of note, due to patient's chronic and high-dose steroids, he is not a candidate

for the COVID vaccination at this time





4. GERD


-Continue pantoprazole





5.  DVT ppx


-Heparin subQ





Disposition: Pending clinical improvement. Chest tube reinserted. When patient 

is medically clear, patient will need placement.





VS, I&O, 24H, Fishbone


Vital Signs/I&O





Vital Signs








  Date Time  Temp Pulse Resp B/P (MAP) Pulse Ox O2 Delivery O2 Flow Rate FiO2


 


3/11/21 18:05   17     


 


3/11/21 16:00       2.0 


 


3/11/21 16:00 97.5 86  104/61 (75) 98 Nasal Cannula  














I&O- Last 24 Hours up to 6 AM 


 


 3/11/21





 06:00


 


Intake Total 750 ml


 


Output Total 1310 ml


 


Balance -560 ml











Laboratory Data


24H LABS


Laboratory Tests 2


3/10/21 19:43: Bedside Glucose (Misc Panel) 567*H


3/10/21 21:28: Bedside Glucose (Misc Panel) 240H


3/10/21 23:32: Bedside Glucose (Misc Panel) 118H


3/11/21 03:05: Bedside Glucose (Misc Panel) 64L


3/11/21 05:34: Bedside Glucose (Misc Panel) 212H


3/11/21 05:43: 


Immature Granulocyte % (Auto) 2.2, Neutrophils (%) (Auto) 83.2H, Lymphocytes (%)

(Auto) 8.0L, Monocytes (%) (Auto) 5.3, Eosinophils (%) (Auto) 0.7, Basophils (%)

(Auto) 0.6, Neutrophils # (Auto) 10.2H, Lymphocytes # (Auto) 1.0L, Monocytes # 

(Auto) 0.6, Eosinophils # (Auto) 0.1, Basophils # (Auto) 0.1, Nucleated Red 

Blood Cells % (auto) 0.0, Anion Gap 2L, Glomerular Filtration Rate > 60.0, 

Calcium Level 8.0L, Magnesium Level 2.1


3/11/21 06:53: Bedside Glucose (Misc Panel) 126H


3/11/21 11:56: Bedside Glucose (Misc Panel) 142H


3/11/21 17:50: Bedside Glucose (Misc Panel) 299H


CBC/BMP


Laboratory Tests


3/11/21 05:43








Microbiology





Microbiology


3/5/21 Respiratory Virus Panel (PCR) ([facility]) - Final, Complete











TEO JACKSON 11, 2021 18:25

## 2021-03-12 VITALS — DIASTOLIC BLOOD PRESSURE: 65 MMHG | SYSTOLIC BLOOD PRESSURE: 108 MMHG

## 2021-03-12 VITALS — DIASTOLIC BLOOD PRESSURE: 77 MMHG | SYSTOLIC BLOOD PRESSURE: 128 MMHG

## 2021-03-12 VITALS — DIASTOLIC BLOOD PRESSURE: 65 MMHG | SYSTOLIC BLOOD PRESSURE: 125 MMHG

## 2021-03-12 VITALS — DIASTOLIC BLOOD PRESSURE: 82 MMHG | SYSTOLIC BLOOD PRESSURE: 124 MMHG

## 2021-03-12 VITALS — DIASTOLIC BLOOD PRESSURE: 64 MMHG | SYSTOLIC BLOOD PRESSURE: 113 MMHG

## 2021-03-12 VITALS — DIASTOLIC BLOOD PRESSURE: 69 MMHG | SYSTOLIC BLOOD PRESSURE: 107 MMHG

## 2021-03-12 LAB
BUN SERPL-MCNC: 27 MG/DL (ref 7–18)
CALCIUM SERPL-MCNC: 8.4 MG/DL (ref 8.8–10.2)
CHLORIDE SERPL-SCNC: 104 MEQ/L (ref 98–107)
CO2 SERPL-SCNC: 36 MEQ/L (ref 21–32)
CREAT SERPL-MCNC: 0.67 MG/DL (ref 0.7–1.3)
GFR SERPL CREATININE-BSD FRML MDRD: > 60 ML/MIN/{1.73_M2} (ref 49–?)
GLUCOSE SERPL-MCNC: 225 MG/DL (ref 70–100)
HCT VFR BLD AUTO: 49.7 % (ref 42–52)
HGB BLD-MCNC: 15.1 G/DL (ref 13.5–17.5)
MAGNESIUM SERPL-MCNC: 2 MG/DL (ref 1.8–2.4)
MCH RBC QN AUTO: 29.2 PG (ref 27–33)
MCHC RBC AUTO-ENTMCNC: 30.4 G/DL (ref 32–36.5)
MCV RBC AUTO: 95.9 FL (ref 80–96)
PLATELET # BLD AUTO: 104 10^3/UL (ref 150–450)
POTASSIUM SERPL-SCNC: 4.6 MEQ/L (ref 3.5–5.1)
RBC # BLD AUTO: 5.18 10^6/UL (ref 4.3–6.1)
SODIUM SERPL-SCNC: 141 MEQ/L (ref 136–145)
WBC # BLD AUTO: 12.3 10^3/UL (ref 4–10)

## 2021-03-12 RX ADMIN — LEVALBUTEROL HYDROCHLORIDE SCH MG: 1.25 SOLUTION, CONCENTRATE RESPIRATORY (INHALATION) at 19:13

## 2021-03-12 RX ADMIN — SODIUM CHLORIDE SCH UNITS: 4.5 INJECTION, SOLUTION INTRAVENOUS at 21:34

## 2021-03-12 RX ADMIN — INSULIN LISPRO SCH UNITS: 100 INJECTION, SOLUTION INTRAVENOUS; SUBCUTANEOUS at 17:16

## 2021-03-12 RX ADMIN — INSULIN LISPRO SCH UNITS: 100 INJECTION, SOLUTION INTRAVENOUS; SUBCUTANEOUS at 21:33

## 2021-03-12 RX ADMIN — MAGNESIUM HYDROXIDE SCH ML: 400 SUSPENSION ORAL at 09:00

## 2021-03-12 RX ADMIN — INSULIN LISPRO SCH UNITS: 100 INJECTION, SOLUTION INTRAVENOUS; SUBCUTANEOUS at 10:15

## 2021-03-12 RX ADMIN — INSULIN DETEMIR SCH UNITS: 100 INJECTION, SOLUTION SUBCUTANEOUS at 21:34

## 2021-03-12 RX ADMIN — LEVALBUTEROL HYDROCHLORIDE SCH MG: 1.25 SOLUTION, CONCENTRATE RESPIRATORY (INHALATION) at 06:59

## 2021-03-12 RX ADMIN — LEVALBUTEROL HYDROCHLORIDE SCH MG: 1.25 SOLUTION, CONCENTRATE RESPIRATORY (INHALATION) at 13:11

## 2021-03-12 RX ADMIN — ATOVAQUONE SCH MG: 750 SUSPENSION ORAL at 10:10

## 2021-03-12 RX ADMIN — SODIUM CHLORIDE SCH UNITS: 4.5 INJECTION, SOLUTION INTRAVENOUS at 14:36

## 2021-03-12 RX ADMIN — HYDROXYCHLOROQUINE SULFATE SCH MG: 200 TABLET, FILM COATED ENTERAL at 10:13

## 2021-03-12 RX ADMIN — DOCUSATE SODIUM SCH MG: 100 CAPSULE, LIQUID FILLED ORAL at 21:00

## 2021-03-12 RX ADMIN — INSULIN LISPRO SCH UNITS: 100 INJECTION, SOLUTION INTRAVENOUS; SUBCUTANEOUS at 12:00

## 2021-03-12 RX ADMIN — PANTOPRAZOLE SODIUM SCH MG: 40 TABLET, DELAYED RELEASE ORAL at 10:16

## 2021-03-12 RX ADMIN — DOCUSATE SODIUM SCH MG: 100 CAPSULE, LIQUID FILLED ORAL at 09:00

## 2021-03-12 RX ADMIN — HYDROXYCHLOROQUINE SULFATE SCH MG: 200 TABLET, FILM COATED ENTERAL at 21:33

## 2021-03-12 NOTE — IPNPDOC
Subjective


Date Seen


The patient was seen on 3/12/21.





Subjective


Chief Complaint/HPI


Mr. Christine is a 65 year old male with pulmonary interstitial fibrosis here with 

spontaneous pneumothorax of the right lung. He was seen this morning, sitting up

and eating breakfast. He feels about the same. This morning, chest tube was 

still in place.





Objective


Physical Examination


General Exam:  Positive: Cooperative, Other (Fatigued)


Eye Exam:  Positive: EOMI; 


   Negative: Sclera icteric


ENT Exam:  Positive: Atraumatic


Chest Exam:  Positive: Clear to auscultation


Heart Exam:  Positive: Rate Normal, Regular Rhythm


Abdomen Exam:  Positive: Normal bowel sounds, Soft; 


   Negative: Tenderness


Extremity Exam:  Negative: Edema


Neuro Exam:  Positive: Normal Speech


Psych Exam:  Positive: Mental status NL





Assessment /Plan


Assessment


Mr. Christine is a 65 year old male with pulmonary interstitial fibrosis here with 

spontaneous pneumothorax of the right lung. Dr. Hya placed chest tube on 

3/5/2021 and removed chest tube on 3/8/2021. After removal, started to have 

signs of growing pneumothorax. Chest tube reinserted on 3/10/2021.





Plan/VTE


VTE Prophylaxis Ordered?:  Yes





Plan


1. Spontaneous pneumothorax of right lung


-Followed by Dr. Hay


-Placed chest tube on 3/5/2021 and removed chest tube on 3/8/2021


-CXR started to demonstrate signs of growing right pneumothorax. Chest tube 

reinserted on 3/10/2021





2. Diabetes mellitus


-Sliding scale insulin and basal insulin


-On levemir 8units qHS (increased from 6units qHS)





3. Lupus


-Patient was scheduled for Cytoxan on 3/11/2021 per patient's Rheumatologist 

office in [place], [place]


-Dr. Benitez spoke with patient, patient request to defer Cytoxan until he has 

recovered


-Continue with prednisone and Plaquenil


Of note, due to patient's chronic and high-dose steroids, he is not a candidate

for the COVID vaccination at this time





4. GERD


-Continue pantoprazole





5.  DVT ppx


-Heparin subQ





Disposition: Pending clinical improvement. Chest tube reinserted. When patient 

is medically clear, patient will need placement.





VS, I&O, 24H, Fishbone


Vital Signs/I&O





Vital Signs








  Date Time  Temp Pulse Resp B/P (MAP) Pulse Ox O2 Delivery O2 Flow Rate FiO2


 


3/12/21 12:00 97.9 97 18 107/69 (82) 96 Nasal Cannula 2.0 














I&O- Last 24 Hours up to 6 AM 


 


 3/12/21





 06:00


 


Intake Total 1901 ml


 


Output Total 1440 ml


 


Balance 461 ml











Laboratory Data


24H LABS


Laboratory Tests 2


3/11/21 17:50: Bedside Glucose (Misc Panel) 299H


3/11/21 20:13: Bedside Glucose (Misc Panel) 396H


3/12/21 05:13: 


Nucleated Red Blood Cells % (auto) 0.0, Anion Gap 1L, Glomerular Filtration Rate

> 60.0, Calcium Level 8.4L, Magnesium Level 2.0


3/12/21 12:50: Bedside Glucose (Misc Panel) 162H


CBC/BMP


Laboratory Tests


3/12/21 05:13








Microbiology





Microbiology


3/5/21 Respiratory Virus Panel (PCR) ([facility]) - Final, Complete











TEO JACKSON 12, 2021 13:23

## 2021-03-12 NOTE — IPN
PROGRESS NOTE



DATE:  03/11/2021



Mr. Christine had his chest tube replaced yesterday. He complains of coughing.

His pain is being well controlled. He is understandably frustrated. 



His vital signs show a maximum temperature of 98.3 with a heart rate that

ranges between 79-93 in a sinus rhythm, respiratory rate 18-20 without the use

of accessory muscles, who is 94%-96% saturated on 2 liters nasal cannula, and

whose blood pressure is ranging between 98/58 to 113/73.



His intake and output for the past 24 hours has been recorded as 350 in and

1380 out, for a negative of 1030 mL. His chest tube put out 280 mL yesterday

and 30 mL in the last 15 hours. He weighs 67.6 kg today compared to 67.7 kg

yesterday.



PHYSICAL EXAMINATION: He has bilateral Velcro crackles at both bases. Breath

sounds are equal on either side. He also has coarse rhonchi throughout, which

do not clear with coughing. Percussion notes are full to the diaphragm. Cardiac

exam is without murmurs, clicks, gallops, or rubs. I cannot feel his point of

maximal impulse (PMI). S1 and S2 are normal. Abdomen is soft and nontender.

Bowel sounds are positive. There is no hepatomegaly. No costovertebral angle

(CVA) tenderness. Extremities show no pretibial edema, no calf tenderness, no

differential swelling of the upper extremities. Skin is warm, dry, and perfused

without cyanosis or mottling, including that of the nailbeds and knees. Neck is

supple. There is no jugular venous distention. No subcutaneous emphysema.

Trachea is midline. Mouth shows the mucous membranes to be pink and moist. Lips

and commissures without lesions. No thrush. Eyes show him pupils to be equal

and reactive. Extraocular motion intact. Sclerae anicteric. Neurologic shows

II-XII intact. Normal gross motor, gross sensation intact. Gait is not tested.

Psychiatric shows him to be awake, alert, and oriented times three with

appropriate mood and affect and conversational.



His white count today is 12.2 with a hemoglobin and hematocrit of 14.9 and

48.2, respectively, with a platelet count of 101. 



His chemistries show nearly normal electrolytes with a total CO2 of 34 and a

BUN and creatinine of 28 and 0.52. Glucose is 81 with a calcium of 8.0 and

magnesium of 2.1. I do not see where he was given a diuretic yesterday, and

this may be just a spontaneous diuresis. 



His chest x-ray today shows his lung fully expanded to the chest wall. There

may be a slight apical airspace in the right upper hemithorax. He has the

reticular infiltrative pattern consistent with his pulmonary fibrosis. 



IMPRESSION: 

1. Spontaneous pneumothorax, right side, recurrent.

2. Advanced interstitial lung disease with clinically idiopathic pulmonary

fibrosis.

3. Underlying chronic obstructive pulmonary disease.

4. Hypoxic polycythemia.

5. Gastroesophageal reflux disease.

6. Diabetes. 



PLAN AND DISCUSSION: I will keep him on suction today, as he still has an air

leak. Really between a rock and a hard place with regard to how to treat this

if this is chronic condition. This the same natural history as end-stage cystic

fibrosis with honey combing and cystic lung disease with honey-combing areas

that are now leaking. If we went to the operating room, I would not know where

to place the staples in order to close the air leak. I may resort to a blood

patch if this does not stop in the next number of days. As I noted before, I

think this is going to be a recurrent problem. 

LENORA

## 2021-03-12 NOTE — REP
INDICATION:

after chest tube placement



COMPARISON:

03/11/2021



TECHNIQUE:

PA and lateral.



FINDINGS:

Right apical chest tube in stable position.  No definite residual pneumothorax

identified.  Small amount of subcutaneous emphysema decreased from prior examination.

The bilateral lung fields demonstrate stable pleuroparenchymal changes.  No obvious

new acute process appreciated.  Mediastinum and cardiac silhouette stable.



IMPRESSION:

1. No obvious residual right pneumothorax.

2. No new process appreciated.  Stable chronic changes.







<Electronically signed by Nixon Rojas > 03/12/21 [id number]

## 2021-03-13 VITALS — SYSTOLIC BLOOD PRESSURE: 115 MMHG | DIASTOLIC BLOOD PRESSURE: 67 MMHG

## 2021-03-13 VITALS — DIASTOLIC BLOOD PRESSURE: 79 MMHG | SYSTOLIC BLOOD PRESSURE: 121 MMHG

## 2021-03-13 VITALS — SYSTOLIC BLOOD PRESSURE: 104 MMHG | DIASTOLIC BLOOD PRESSURE: 66 MMHG

## 2021-03-13 VITALS — DIASTOLIC BLOOD PRESSURE: 67 MMHG | SYSTOLIC BLOOD PRESSURE: 100 MMHG

## 2021-03-13 VITALS — SYSTOLIC BLOOD PRESSURE: 107 MMHG | DIASTOLIC BLOOD PRESSURE: 66 MMHG

## 2021-03-13 VITALS — DIASTOLIC BLOOD PRESSURE: 60 MMHG | SYSTOLIC BLOOD PRESSURE: 100 MMHG

## 2021-03-13 LAB
BUN SERPL-MCNC: 28 MG/DL (ref 7–18)
CALCIUM SERPL-MCNC: 7.6 MG/DL (ref 8.8–10.2)
CHLORIDE SERPL-SCNC: 106 MEQ/L (ref 98–107)
CO2 SERPL-SCNC: 36 MEQ/L (ref 21–32)
CREAT SERPL-MCNC: 0.53 MG/DL (ref 0.7–1.3)
GFR SERPL CREATININE-BSD FRML MDRD: > 60 ML/MIN/{1.73_M2} (ref 49–?)
GLUCOSE SERPL-MCNC: 136 MG/DL (ref 70–100)
HCT VFR BLD AUTO: 47.8 % (ref 42–52)
HGB BLD-MCNC: 14.7 G/DL (ref 13.5–17.5)
MCH RBC QN AUTO: 29.5 PG (ref 27–33)
MCHC RBC AUTO-ENTMCNC: 30.8 G/DL (ref 32–36.5)
MCV RBC AUTO: 95.8 FL (ref 80–96)
PLATELET # BLD AUTO: 104 10^3/UL (ref 150–450)
POTASSIUM SERPL-SCNC: 4.1 MEQ/L (ref 3.5–5.1)
RBC # BLD AUTO: 4.99 10^6/UL (ref 4.3–6.1)
SODIUM SERPL-SCNC: 140 MEQ/L (ref 136–145)
WBC # BLD AUTO: 12 10^3/UL (ref 4–10)

## 2021-03-13 RX ADMIN — HYDROXYCHLOROQUINE SULFATE SCH MG: 200 TABLET, FILM COATED ENTERAL at 20:54

## 2021-03-13 RX ADMIN — DOCUSATE SODIUM SCH MG: 100 CAPSULE, LIQUID FILLED ORAL at 20:55

## 2021-03-13 RX ADMIN — ATOVAQUONE SCH MG: 750 SUSPENSION ORAL at 09:07

## 2021-03-13 RX ADMIN — INSULIN LISPRO SCH UNITS: 100 INJECTION, SOLUTION INTRAVENOUS; SUBCUTANEOUS at 17:54

## 2021-03-13 RX ADMIN — SODIUM CHLORIDE SCH UNITS: 4.5 INJECTION, SOLUTION INTRAVENOUS at 09:08

## 2021-03-13 RX ADMIN — LEVALBUTEROL HYDROCHLORIDE SCH MG: 1.25 SOLUTION, CONCENTRATE RESPIRATORY (INHALATION) at 01:05

## 2021-03-13 RX ADMIN — INSULIN DETEMIR SCH UNITS: 100 INJECTION, SOLUTION SUBCUTANEOUS at 20:55

## 2021-03-13 RX ADMIN — INSULIN LISPRO SCH UNITS: 100 INJECTION, SOLUTION INTRAVENOUS; SUBCUTANEOUS at 09:06

## 2021-03-13 RX ADMIN — PANTOPRAZOLE SODIUM SCH MG: 40 TABLET, DELAYED RELEASE ORAL at 09:07

## 2021-03-13 RX ADMIN — HYDROXYCHLOROQUINE SULFATE SCH MG: 200 TABLET, FILM COATED ENTERAL at 09:07

## 2021-03-13 RX ADMIN — INSULIN LISPRO SCH UNITS: 100 INJECTION, SOLUTION INTRAVENOUS; SUBCUTANEOUS at 13:26

## 2021-03-13 RX ADMIN — LEVALBUTEROL HYDROCHLORIDE SCH MG: 1.25 SOLUTION, CONCENTRATE RESPIRATORY (INHALATION) at 13:49

## 2021-03-13 RX ADMIN — DOCUSATE SODIUM SCH MG: 100 CAPSULE, LIQUID FILLED ORAL at 09:00

## 2021-03-13 RX ADMIN — SODIUM CHLORIDE SCH UNITS: 4.5 INJECTION, SOLUTION INTRAVENOUS at 20:54

## 2021-03-13 RX ADMIN — LEVALBUTEROL HYDROCHLORIDE SCH MG: 1.25 SOLUTION, CONCENTRATE RESPIRATORY (INHALATION) at 08:16

## 2021-03-13 RX ADMIN — INSULIN LISPRO SCH UNITS: 100 INJECTION, SOLUTION INTRAVENOUS; SUBCUTANEOUS at 20:55

## 2021-03-13 RX ADMIN — LEVALBUTEROL HYDROCHLORIDE SCH MG: 1.25 SOLUTION, CONCENTRATE RESPIRATORY (INHALATION) at 19:22

## 2021-03-13 RX ADMIN — MAGNESIUM HYDROXIDE SCH ML: 400 SUSPENSION ORAL at 09:00

## 2021-03-13 NOTE — REP
INDICATION:

after chest tube placement



COMPARISON:

None.



TECHNIQUE:

PA and lateral.



FINDINGS:

Right apical chest tube again identified in stable position.  Lucent area in the right

apex is similar to prior examination and may represent pleural reflection and small

residual pneumothorax versus chronic change.  Bilateral lung fields demonstrate

diffuse chronic fibrosis and interstitial changes which remain relatively stable.  No

new acute process identified.



Mediastinum and cardiac silhouette are stable.  Small amount of subcutaneous emphysema

along the right upper lateral chest wall again noted.  Skeletal structures intact.



IMPRESSION:

1.  Possible small residual right apical pneumothorax versus chronic change.

2.  No new acute pleuroparenchymal process appreciated.





<Electronically signed by Nixon Rojas > 03/13/21 [id number]

## 2021-03-13 NOTE — IPNPDOC
Subjective


Date Seen


The patient was seen on 3/13/21.





Subjective


Chief Complaint/HPI


Mr. Christine is a 65 year old male with pulmonary interstitial fibrosis here with 

spontaneous pneumothorax of the right lung. He was seen in the morning when he 

was sitting up and finishing breakfast. He feels about the same and chest tube 

was present





Objective


Physical Examination


General Exam:  Positive: Cooperative, Other (Fatigued)


Eye Exam:  Positive: EOMI; 


   Negative: Sclera icteric


ENT Exam:  Positive: Atraumatic


Chest Exam:  Positive: Clear to auscultation


Heart Exam:  Positive: Rate Normal, Regular Rhythm


Abdomen Exam:  Positive: Normal bowel sounds, Soft; 


   Negative: Tenderness


Extremity Exam:  Negative: Edema


Neuro Exam:  Positive: Normal Speech


Psych Exam:  Positive: Mental status NL





Assessment /Plan


Assessment


Mr. Christine is a 65 year old male with pulmonary interstitial fibrosis here with 

spontaneous pneumothorax of the right lung. Dr. Hay placed chest tube on 

3/5/2021 and removed chest tube on 3/8/2021. After removal, started to have 

signs of growing pneumothorax. Chest tube reinserted on 3/10/2021.





Plan/VTE


VTE Prophylaxis Ordered?:  Yes





Plan


1. Spontaneous pneumothorax of right lung


-Followed by Dr. Hay


-Placed chest tube on 3/5/2021 and removed chest tube on 3/8/2021


-CXR started to demonstrate signs of growing right pneumothorax. Chest tube 

reinserted on 3/10/2021





2. Diabetes mellitus


-Sliding scale insulin and basal insulin


-On levemir 8units qHS (increased from 6units qHS)





3. Lupus


-Patient was scheduled for Cytoxan on 3/11/2021 per patient's Rheumatologist 

office in [place], [place]


-Dr. Benitez spoke with patient, patient request to defer Cytoxan until he has 

recovered


-Continue with prednisone and Plaquenil


Of note, due to patient's chronic and high-dose steroids, he is not a candidate

for the COVID vaccination at this time





4. GERD


-Continue pantoprazole





5.  DVT ppx


-Heparin subQ





Disposition: Pending clinical improvement. When patient is medically clear, 

patient will need placement.





VS, I&O, 24H, Fishbone


Vital Signs/I&O





Vital Signs








  Date Time  Temp Pulse Resp B/P (MAP) Pulse Ox O2 Delivery O2 Flow Rate FiO2


 


3/13/21 12:00 97.3 74 16 115/67 (83) 99 Nasal Cannula 2.0 














I&O- Last 24 Hours up to 6 AM 


 


 3/13/21





 06:00


 


Intake Total 1400 ml


 


Output Total 1220 ml


 


Balance 180 ml











Laboratory Data


24H LABS


Laboratory Tests 2


3/12/21 12:50: Bedside Glucose (Misc Panel) 162H


3/12/21 16:41: Bedside Glucose (Misc Panel) 340H


3/12/21 21:10: Bedside Glucose (Misc Panel) 308H


3/13/21 05:36: 


Nucleated Red Blood Cells % (auto) 0.0, Anion Gap , Glomerular Filtration Rate >

60.0, Calcium Level 7.6L


3/13/21 11:36: Bedside Glucose (Misc Panel) 203H


CBC/BMP


Laboratory Tests


3/13/21 05:36








Microbiology





Microbiology


3/5/21 Respiratory Virus Panel (PCR) ([facility]) - Final, Complete











TEO JACKSON 13, 2021 12:47

## 2021-03-14 VITALS — SYSTOLIC BLOOD PRESSURE: 98 MMHG | DIASTOLIC BLOOD PRESSURE: 61 MMHG

## 2021-03-14 VITALS — SYSTOLIC BLOOD PRESSURE: 106 MMHG | DIASTOLIC BLOOD PRESSURE: 61 MMHG

## 2021-03-14 VITALS — DIASTOLIC BLOOD PRESSURE: 74 MMHG | SYSTOLIC BLOOD PRESSURE: 112 MMHG

## 2021-03-14 VITALS — DIASTOLIC BLOOD PRESSURE: 61 MMHG | SYSTOLIC BLOOD PRESSURE: 107 MMHG

## 2021-03-14 VITALS — DIASTOLIC BLOOD PRESSURE: 64 MMHG | SYSTOLIC BLOOD PRESSURE: 99 MMHG

## 2021-03-14 VITALS — SYSTOLIC BLOOD PRESSURE: 101 MMHG | DIASTOLIC BLOOD PRESSURE: 69 MMHG

## 2021-03-14 LAB
BUN SERPL-MCNC: 24 MG/DL (ref 7–18)
CALCIUM SERPL-MCNC: 7.8 MG/DL (ref 8.8–10.2)
CHLORIDE SERPL-SCNC: 103 MEQ/L (ref 98–107)
CO2 SERPL-SCNC: 36 MEQ/L (ref 21–32)
CREAT SERPL-MCNC: 0.41 MG/DL (ref 0.7–1.3)
GFR SERPL CREATININE-BSD FRML MDRD: > 60 ML/MIN/{1.73_M2} (ref 49–?)
GLUCOSE SERPL-MCNC: 113 MG/DL (ref 70–100)
HCT VFR BLD AUTO: 47.8 % (ref 42–52)
HGB BLD-MCNC: 14.6 G/DL (ref 13.5–17.5)
MCH RBC QN AUTO: 29.1 PG (ref 27–33)
MCHC RBC AUTO-ENTMCNC: 30.5 G/DL (ref 32–36.5)
MCV RBC AUTO: 95.2 FL (ref 80–96)
PLATELET # BLD AUTO: 102 10^3/UL (ref 150–450)
POTASSIUM SERPL-SCNC: 4 MEQ/L (ref 3.5–5.1)
RBC # BLD AUTO: 5.02 10^6/UL (ref 4.3–6.1)
SODIUM SERPL-SCNC: 141 MEQ/L (ref 136–145)
WBC # BLD AUTO: 10.8 10^3/UL (ref 4–10)

## 2021-03-14 RX ADMIN — DOCUSATE SODIUM SCH MG: 100 CAPSULE, LIQUID FILLED ORAL at 20:20

## 2021-03-14 RX ADMIN — INSULIN LISPRO SCH UNITS: 100 INJECTION, SOLUTION INTRAVENOUS; SUBCUTANEOUS at 08:00

## 2021-03-14 RX ADMIN — INSULIN LISPRO SCH UNITS: 100 INJECTION, SOLUTION INTRAVENOUS; SUBCUTANEOUS at 20:21

## 2021-03-14 RX ADMIN — LEVALBUTEROL HYDROCHLORIDE SCH MG: 1.25 SOLUTION, CONCENTRATE RESPIRATORY (INHALATION) at 09:04

## 2021-03-14 RX ADMIN — HYDROXYCHLOROQUINE SULFATE SCH MG: 200 TABLET, FILM COATED ENTERAL at 20:20

## 2021-03-14 RX ADMIN — ATOVAQUONE SCH MG: 750 SUSPENSION ORAL at 07:59

## 2021-03-14 RX ADMIN — INSULIN DETEMIR SCH UNITS: 100 INJECTION, SOLUTION SUBCUTANEOUS at 20:21

## 2021-03-14 RX ADMIN — INSULIN LISPRO SCH UNITS: 100 INJECTION, SOLUTION INTRAVENOUS; SUBCUTANEOUS at 17:53

## 2021-03-14 RX ADMIN — LEVALBUTEROL HYDROCHLORIDE SCH MG: 1.25 SOLUTION, CONCENTRATE RESPIRATORY (INHALATION) at 20:18

## 2021-03-14 RX ADMIN — INSULIN LISPRO SCH UNITS: 100 INJECTION, SOLUTION INTRAVENOUS; SUBCUTANEOUS at 12:32

## 2021-03-14 RX ADMIN — SODIUM CHLORIDE SCH UNITS: 4.5 INJECTION, SOLUTION INTRAVENOUS at 08:00

## 2021-03-14 RX ADMIN — LEVALBUTEROL HYDROCHLORIDE SCH MG: 1.25 SOLUTION, CONCENTRATE RESPIRATORY (INHALATION) at 00:30

## 2021-03-14 RX ADMIN — DOCUSATE SODIUM SCH MG: 100 CAPSULE, LIQUID FILLED ORAL at 07:59

## 2021-03-14 RX ADMIN — PANTOPRAZOLE SODIUM SCH MG: 40 TABLET, DELAYED RELEASE ORAL at 07:59

## 2021-03-14 RX ADMIN — MAGNESIUM HYDROXIDE SCH ML: 400 SUSPENSION ORAL at 08:00

## 2021-03-14 RX ADMIN — SODIUM CHLORIDE SCH UNITS: 4.5 INJECTION, SOLUTION INTRAVENOUS at 20:20

## 2021-03-14 RX ADMIN — HYDROXYCHLOROQUINE SULFATE SCH MG: 200 TABLET, FILM COATED ENTERAL at 07:59

## 2021-03-14 RX ADMIN — LEVALBUTEROL HYDROCHLORIDE SCH MG: 1.25 SOLUTION, CONCENTRATE RESPIRATORY (INHALATION) at 16:04

## 2021-03-14 NOTE — IPNPDOC
Subjective


Date Seen


The patient was seen on 3/14/21.





Subjective


Chief Complaint/HPI


Mr. Christine is a 65 year old male with pulmonary interstitial fibrosis here with 

spontaneous pneumothorax of the right lung. He was seen early in the morning. He

had just finished breakfast and was going down for CXR. Feels about the same. 

Denies chest pain or worsening dyspnea. Chest tube present in the morning.





Objective


Physical Examination


General Exam:  Positive: Cooperative, Other (Fatigued)


Eye Exam:  Positive: EOMI; 


   Negative: Sclera icteric


ENT Exam:  Positive: Atraumatic


Chest Exam:  Positive: Clear to auscultation


Heart Exam:  Positive: Rate Normal, Regular Rhythm


Abdomen Exam:  Positive: Normal bowel sounds, Soft; 


   Negative: Tenderness


Extremity Exam:  Negative: Edema


Neuro Exam:  Positive: Normal Speech


Psych Exam:  Positive: Mental status NL





Assessment /Plan


Assessment


Mr. Christine is a 65 year old male with pulmonary interstitial fibrosis here with 

spontaneous pneumothorax of the right lung. Dr. Hay placed chest tube on 

3/5/2021 and removed chest tube on 3/8/2021. After removal, started to have 

signs of growing pneumothorax. Chest tube reinserted on 3/10/2021.





Plan/VTE


VTE Prophylaxis Ordered?:  Yes





Plan


1. Spontaneous pneumothorax of right lung


-Followed by Dr. Hay


-Placed chest tube on 3/5/2021 and removed chest tube on 3/8/2021


-CXR started to demonstrate signs of growing right pneumothorax. Chest tube 

reinserted on 3/10/2021





2. Diabetes mellitus


-Sliding scale insulin and basal insulin


-On levemir 8units qHS (increased from 6units qHS)





3. Lupus


-Patient was scheduled for Cytoxan on 3/11/2021 per patient's Rheumatologist 

office in [place], [place]


-Dr. Benitez spoke with patient, patient request to defer Cytoxan until he has 

recovered


-Continue with prednisone and Plaquenil


Of note, due to patient's chronic and high-dose steroids, he is not a candidate

for the COVID vaccination at this time





4. GERD


-Continue pantoprazole





5.  DVT ppx


-Heparin subQ





Disposition: Pending clinical improvement. When patient is medically clear, 

patient will need placement.





VS, I&O, 24H, Fishbone


Vital Signs/I&O





Vital Signs








  Date Time  Temp Pulse Resp B/P (MAP) Pulse Ox O2 Delivery O2 Flow Rate FiO2


 


3/14/21 10:19   17     


 


3/14/21 09:33     95 Nasal Cannula 2.0 


 


3/14/21 08:00 97.5 82  101/69 (80)    














I&O- Last 24 Hours up to 6 AM 


 


 3/14/21





 06:00


 


Intake Total 480 ml


 


Output Total 2290 ml


 


Balance -1810 ml











Laboratory Data


24H LABS


Laboratory Tests 2


3/13/21 11:36: Bedside Glucose (Misc Panel) 203H


3/13/21 17:38: Bedside Glucose (Misc Panel) 245H


3/13/21 20:16: Bedside Glucose (Misc Panel) 315H


3/14/21 05:31: 


Nucleated Red Blood Cells % (auto) 0.0, Anion Gap 2L, Glomerular Filtration Rate

> 60.0, Calcium Level 7.8L


CBC/BMP


Laboratory Tests


3/14/21 05:31








Microbiology





Microbiology


3/5/21 Respiratory Virus Panel (PCR) ([facility]) - Final, Complete











TEO JACKSON 14, 2021 11:35

## 2021-03-14 NOTE — IPN
PROGRESS NOTE



DATE:  03/12/2021



SUBJECTIVE: Mr. Christine is sitting up comfortably. He is still short of breath

with minimal activity. His chest tube shows a one bubble air leak. He is still

on suction. 



OBJECTIVE: 

VITAL SIGNS: Show a T-max of 97.9 with a heart rate that ranges between 73 and

97 in sinus rhythm. Respiratory rate of 16 to 20 without the use of accessory

muscles who is 93% to 97% saturated on 2 liters nasal cannula and whose blood

pressure is ranging between 107/69 to 128/77. 

INTAKE AND OUTPUT: Over the past 24 hours has been recorded as 2501 in and 1615

out for a positivity of 880 mL. He has put 215 mL out the chest tube. He has

the above one bubble air leak with forceful coughing. Weight today is 67.4 kg

compared to 67.6 kg yesterday. 

RESPIRATORY: He has bilateral fine crackles from mid to end-expiration.

Percussion notes are full to the diaphragm. 

CARDIAC: Without murmurs, clicks, gallops, or rubs. I cannot feel his PMI. S1

and S2 are normal. 

ABDOMEN: Soft and nontender. Bowel sounds are positive. There is no

hepatomegaly. No CVA tenderness. 

EXTREMITIES: Show no pretibial edema. No calf tenderness. No differential

swelling of the upper extremities. 

SKIN: Warm, dry, and perfused without cyanosis or mottling, including that of

the nail beds and knees. 

NECK: Supple. There is no jugular venous distention. No subcutaneous emphysema.

Trachea is midline.

MOUTH: Shows the mucous membranes to be pink and moist. Lips and commisures are

without lesions and no thrush.

EYES: Show his pupils equal and reactive. Extraocular movements are intact.

Sclerae nonicteric. 

NEUROLOGIC: Shows II through XII intact. Normal gross motor, gross sensation

intact. Gait is not tested. 

PSYCHIATRIC: Shows to be awake, alert, and oriented x3 with appropriate mood

and affect and conversational. 



LABORATORY DATA: His white count today is 12.3 unchanged from yesterday with

hemoglobin and hematocrit of 15.1 and 49.7 respectively with a platelet count

of 104,000 and stable. There is no differential. His electrolytes show a total

CO2 elevated at 36. BUN and creatinine are 27 and 0.67. Glucose is 225 with a

calcium of 8.4 and a magnesium of 2.0.



IMAGING DATA: His chest x-ray today shows dissipating subcutaneous emphysema in

the right upper chest. He still has what looks to be an air space in the upper

lateral chest. I suspect that this is secondary to low compliance of his lungs

secondary to his pulmonary fibrosis. Costophrenic angles are sharp. Cardiac

silhouette looks increased. Chest tube is in good place. 



IMPRESSION:

1.  Spontaneous pneumothorax right side recurrent.

2.  Advanced interstitial lung disease with clinically looking like idiopathic

    pulmonary fibrosis. 

3.  Underlying chronic obstructive pulmonary disease.

4.  Hypoxic polycythemia.

5.  Gastroesophageal reflux disease.

6.  Diabetes. 



PLAN AND DISCUSSION: I will again keep his chest tube on suction today. I would

like for him to go without an air leak on suction for at least two days prior

to decreasing the suction. As I have noted before, I have the sinking feeling

that this is going to be recurrent again and again.

## 2021-03-14 NOTE — REP
INDICATION:

after chest tube placement



COMPARISON:

03/13/2021



TECHNIQUE:

PA and lateral.



FINDINGS:

Right-sided chest tube, subcutaneous emphysema, and small chronic right apical

pneumothorax along with diffuse bilateral chronic fibrosis and interstitial changes

again noted and stable.  No obvious new acute process appreciated.  Mediastinum and

cardiac silhouette stable.  Skeletal structures grossly intact.



IMPRESSION:

No significant change from prior examination.





<Electronically signed by Nixon Rojas > 03/14/21 0956

## 2021-03-14 NOTE — IPN
PROGRESS NOTE



DATE:  03/13/2021



SUBJECTIVE: Mr. Christine still has a small air leak with forceful coughing and the

lung is further away from the chest wall than it was yesterday. I have,

therefore, increased the suction. 



OBJECTIVE: 

VITAL SIGNS: Show a T-max of 97.4 with a heart rate that ranges between 73 and

83 in sinus rhythm. Respiratory rate of 16 to 20 without the use of accessory

muscles who is 97% to 98% saturated on 2 liters nasal cannula and whose blood

pressure is ranging between 121/79 to 100/60. 

INTAKE AND OUTPUT: Over the past 24 hours has been recorded as 1400 in and 1525

out for a negativity of 125 mL. He has put 25 mL out the chest tube and he has

a small air leak with forceful coughing. Weight today is 69.8 kg compared to

67.4 kg yesterday.  

RESPIRATORY: He does have equal breath sounds on either side with fine

velcro-type crackles in mid to end-expiration. Percussion notes are full to the

diaphragm. There is no subcutaneous emphysema. 

ABDOMEN: Soft and nontender. Bowel sounds are positive. There is no

hepatomegaly. No CVA tenderness. 

EXTREMITIES: Show no pretibial edema. No calf tenderness. No differential

swelling of the upper extremities. 

SKIN: Warm, dry, and perfused without cyanosis or mottling, including that of

the nail beds and knees. 

NECK: Supple. There is no jugular venous distention. No subcutaneous emphysema.

Trachea is midline.

MOUTH: Shows the mucous membranes to be pink and moist. Lips and commisures are

without lesions and no thrush.

EYES: Show his pupils equal and reactive. Extraocular movements are intact.

Sclerae nonicteric. 

NEUROLOGIC: Shows II through XII intact. Normal gross motor, gross sensation

intact. Gait is not tested. 

PSYCHIATRIC: Shows him to be awake, alert, and oriented x3 with appropriate

mood and affect and conversational. 



LABORATORY DATA: His white count today is 12.0 with a hemoglobin and hematocrit

of 14.7 and 47.8 respectively with a platelet count of 104,000. Chemistries

today show essentially normal electrolytes except for an elevated total CO2 of

36. His BUN and creatinine are 28 and 0.53 with a glucose of 136 and a calcium

of 7.6. 



IMAGING DATA: His chest x-ray today shows more of an apical air space with the

lung further away from the chest wall than it was yesterday. The chest tube is

in excellent position at the apex. Costophrenic angles are sharp and he has the

reticular infiltrates consistent with his pulmonary fibrosis. 



IMPRESSION:

1.  Spontaneous pneumothorax right side recurrent.

2.  Advanced interstitial lung disease with clinically idiopathic pulmonary

    fibrosis. 

3.  Underlying chronic obstructive pulmonary disease. 

4.  Hypoxic polycythemia.

5.  Gastroesophageal reflux disease.

6.  Diabetes.



PLAN AND DISCUSSION: I will increase the chest tube suction to 40 today. My

goal is to get the lung to expand completely to the chest wall in order to

control his air leak. We may be coming to blood patch time, although I would

like to hold off on that a little bit longer.

## 2021-03-14 NOTE — REP
INDICATION:

repeat without wires in the way!



COMPARISON:

03/14/2021 at 8:28 a.m., 03/13/2021 at 7:44 a.m.



TECHNIQUE:

PA and lateral.



FINDINGS:

Right apical chest tube, stable chronic right apical pneumothorax, subcutaneous

emphysema, and diffuse bilateral parenchymal changes remain essentially stable.  No

obvious new acute process appreciated.  Mediastinum and cardiac silhouette stable.

Skeletal structures intact.



IMPRESSION:

No significant change from prior examinations.





<Electronically signed by Nixon Rojas > 03/14/21 0923

## 2021-03-15 VITALS — DIASTOLIC BLOOD PRESSURE: 71 MMHG | SYSTOLIC BLOOD PRESSURE: 106 MMHG

## 2021-03-15 VITALS — DIASTOLIC BLOOD PRESSURE: 61 MMHG | SYSTOLIC BLOOD PRESSURE: 112 MMHG

## 2021-03-15 VITALS — DIASTOLIC BLOOD PRESSURE: 66 MMHG | SYSTOLIC BLOOD PRESSURE: 130 MMHG

## 2021-03-15 VITALS — SYSTOLIC BLOOD PRESSURE: 106 MMHG | DIASTOLIC BLOOD PRESSURE: 64 MMHG

## 2021-03-15 VITALS — DIASTOLIC BLOOD PRESSURE: 59 MMHG | SYSTOLIC BLOOD PRESSURE: 101 MMHG

## 2021-03-15 VITALS — DIASTOLIC BLOOD PRESSURE: 50 MMHG | SYSTOLIC BLOOD PRESSURE: 91 MMHG

## 2021-03-15 LAB
BUN SERPL-MCNC: 17 MG/DL (ref 7–18)
CALCIUM SERPL-MCNC: 8.1 MG/DL (ref 8.8–10.2)
CHLORIDE SERPL-SCNC: 101 MEQ/L (ref 98–107)
CO2 SERPL-SCNC: 39 MEQ/L (ref 21–32)
CREAT SERPL-MCNC: 0.54 MG/DL (ref 0.7–1.3)
GFR SERPL CREATININE-BSD FRML MDRD: > 60 ML/MIN/{1.73_M2} (ref 49–?)
GLUCOSE SERPL-MCNC: 111 MG/DL (ref 70–100)
HCT VFR BLD AUTO: 47.8 % (ref 42–52)
HGB BLD-MCNC: 14.5 G/DL (ref 13.5–17.5)
MCH RBC QN AUTO: 29 PG (ref 27–33)
MCHC RBC AUTO-ENTMCNC: 30.3 G/DL (ref 32–36.5)
MCV RBC AUTO: 95.6 FL (ref 80–96)
PLATELET # BLD AUTO: 100 10^3/UL (ref 150–450)
POTASSIUM SERPL-SCNC: 4 MEQ/L (ref 3.5–5.1)
RBC # BLD AUTO: 5 10^6/UL (ref 4.3–6.1)
SODIUM SERPL-SCNC: 139 MEQ/L (ref 136–145)
WBC # BLD AUTO: 10.1 10^3/UL (ref 4–10)

## 2021-03-15 PROCEDURE — 3E0S3GC INTRODUCTION OF OTHER THERAPEUTIC SUBSTANCE INTO EPIDURAL SPACE, PERCUTANEOUS APPROACH: ICD-10-PCS | Performed by: THORACIC SURGERY (CARDIOTHORACIC VASCULAR SURGERY)

## 2021-03-15 PROCEDURE — 05H53DZ INSERTION OF INTRALUMINAL DEVICE INTO RIGHT SUBCLAVIAN VEIN, PERCUTANEOUS APPROACH: ICD-10-PCS | Performed by: THORACIC SURGERY (CARDIOTHORACIC VASCULAR SURGERY)

## 2021-03-15 RX ADMIN — ATOVAQUONE SCH MG: 750 SUSPENSION ORAL at 08:14

## 2021-03-15 RX ADMIN — SODIUM CHLORIDE SCH UNITS: 4.5 INJECTION, SOLUTION INTRAVENOUS at 19:47

## 2021-03-15 RX ADMIN — DOCUSATE SODIUM SCH MG: 100 CAPSULE, LIQUID FILLED ORAL at 08:15

## 2021-03-15 RX ADMIN — HYDROXYCHLOROQUINE SULFATE SCH MG: 200 TABLET, FILM COATED ENTERAL at 20:26

## 2021-03-15 RX ADMIN — LEVALBUTEROL HYDROCHLORIDE SCH MG: 1.25 SOLUTION, CONCENTRATE RESPIRATORY (INHALATION) at 07:26

## 2021-03-15 RX ADMIN — INSULIN DETEMIR SCH UNITS: 100 INJECTION, SOLUTION SUBCUTANEOUS at 20:27

## 2021-03-15 RX ADMIN — HYDROXYCHLOROQUINE SULFATE SCH MG: 200 TABLET, FILM COATED ENTERAL at 08:14

## 2021-03-15 RX ADMIN — LEVALBUTEROL HYDROCHLORIDE SCH MG: 1.25 SOLUTION, CONCENTRATE RESPIRATORY (INHALATION) at 20:08

## 2021-03-15 RX ADMIN — LEVALBUTEROL HYDROCHLORIDE SCH MG: 1.25 SOLUTION, CONCENTRATE RESPIRATORY (INHALATION) at 14:06

## 2021-03-15 RX ADMIN — LEVALBUTEROL HYDROCHLORIDE SCH MG: 1.25 SOLUTION, CONCENTRATE RESPIRATORY (INHALATION) at 01:07

## 2021-03-15 RX ADMIN — INSULIN LISPRO SCH UNITS: 100 INJECTION, SOLUTION INTRAVENOUS; SUBCUTANEOUS at 08:16

## 2021-03-15 RX ADMIN — PANTOPRAZOLE SODIUM SCH MG: 40 TABLET, DELAYED RELEASE ORAL at 08:14

## 2021-03-15 RX ADMIN — MAGNESIUM HYDROXIDE SCH ML: 400 SUSPENSION ORAL at 08:16

## 2021-03-15 RX ADMIN — SODIUM CHLORIDE SCH UNITS: 4.5 INJECTION, SOLUTION INTRAVENOUS at 09:00

## 2021-03-15 RX ADMIN — INSULIN LISPRO SCH UNITS: 100 INJECTION, SOLUTION INTRAVENOUS; SUBCUTANEOUS at 17:48

## 2021-03-15 RX ADMIN — SODIUM CHLORIDE, PRESERVATIVE FREE SCH ML: 5 INJECTION INTRAVENOUS at 20:27

## 2021-03-15 RX ADMIN — SODIUM CHLORIDE, PRESERVATIVE FREE SCH ML: 5 INJECTION INTRAVENOUS at 17:48

## 2021-03-15 RX ADMIN — DOCUSATE SODIUM SCH MG: 100 CAPSULE, LIQUID FILLED ORAL at 19:51

## 2021-03-15 RX ADMIN — INSULIN LISPRO SCH UNITS: 100 INJECTION, SOLUTION INTRAVENOUS; SUBCUTANEOUS at 20:27

## 2021-03-15 RX ADMIN — INSULIN LISPRO SCH UNITS: 100 INJECTION, SOLUTION INTRAVENOUS; SUBCUTANEOUS at 12:11

## 2021-03-15 NOTE — IPN
PROGRESS NOTE



DATE:  03/15/2021



SUBJECTIVE: I turned Mr. Christine suction down to 20 cm yesterday. He now has an

air leak once again. His chest x-ray is unchanged, but nonetheless he is still

having air leak.  



OBJECTIVE: 

VITAL SIGNS: Show a T-max of 99.2 with a heart rate that ranges between 68 and

77 in sinus rhythm. Respiratory rate that is constant 18 who is 98% to 100%

saturated on 2 liters nasal cannula and whose blood pressure is ranging between

106/71 to 112/74. 

INTAKE AND OUTPUT: Over the past 24 hours has been recorded as 1800 in and 550

out for a positivity of 1250 mL. He has put out 100 mL out the chest tube. His

weight today is 70 kg compared to 69 kg yesterday. 

RESPIRATORY: His lungs show equal breath sounds on either side. I hear the

slightest of velcro crackles at the very end of inspiration on either side at

the bases. Percussion notes are full to the diaphragm. I feel no subcutaneous

emphysema. 

CARDIAC: Without murmurs, clicks, gallops, or rubs. I cannot feel his PMI. S1

and S2 are normal. 

ABDOMEN: Soft and nontender. Bowel sounds are positive. There is no

hepatomegaly. No CVA tenderness. 

EXTREMITIES: Show no pretibial edema. No calf tenderness. No differential

swelling of the upper extremities. 

SKIN: Warm, dry, and perfused without cyanosis or mottling, including that of

the nail beds and knees. 

NECK: Supple. There is no jugular venous distention. No subcutaneous emphysema.

Trachea is midline.

MOUTH: Shows the mucous membranes to be pink and moist. Lips and commisures are

without lesions and no thrush.

EYES: Show his pupils equal and reactive. Extraocular movements are intact.

Sclerae nonicteric. 

NEUROLOGIC: Shows II through XII intact. Normal gross motor, gross sensation

intact. Gait is also intact. 

PSYCHIATRIC: Shows him to be awake, alert, and oriented x3 with appropriate

mood and affect and conversational. 



LABORATORY DATA: His white count today is 10.1 with a hemoglobin and hematocrit

of 14.5 and 47.8 respectively. Platelet count is 100,000. There is no

differential. Electrolytes are normal today with an elevated total CO2 of 39,

however. BUN and creatinine is 17 and 0.54 with a glucose of 111 and a calcium

of 8.1.



IMAGING DATA: His chest x-ray is unchanged from yesterday. He still has an

apical lateral air space. It measures 1 cm. 



IMPRESSION:

1.  Spontaneous pneumothorax right side recurrent. 

2.  Advanced interstitial lung disease clinically idiopathic pulmonary fibrosis.

3.  Underlying chronic obstructive pulmonary disease.

4.  Hypoxic polycythemia. 

5.  Gastroesophageal reflux disease. 

6.  Diabetes. 

7.  Alveolopleural fistula. 



PLAN AND DISCUSSION: I will attempt a blood patch either later today or

tomorrow. I will place a central line and then instill blood into the apex of

the chest via the chest tube. I will then discontinue his suction and see if we

can clot the leak off.

## 2021-03-15 NOTE — REP
INDICATION:

after chest tube placement



COMPARISON:

03/14/2021



TECHNIQUE:

PA and lateral.



FINDINGS:

Right apical chest tube in stable position.  Small right apical pneumothorax again

noted but decreased from prior exam.  Diffuse bilateral chronic pulmonary parenchymal

findings remains stable.  No new acute process identified.  Mediastinum and cardiac

silhouette stable.  Small amount of residual subcutaneous emphysema along the right

lateral chest wall again noted.



IMPRESSION:

Right apical pneumothorax minimally decreased in size.

No new acute process appreciated.





<Electronically signed by Nixon Rojas > 03/15/21 0840

## 2021-03-15 NOTE — IPNPDOC
Subjective


Date Seen


The patient was seen on 3/15/21.





Subjective


Chief Complaint/HPI


Mr. Christine is a 65 year old male with pulmonary interstitial fibrosis here with 

spontaneous pneumothorax of the right lung. He was seen in the morning. Chest 

tubes still present in the morning. He feels about the same. Denies any chest 

pain or worsening dyspnea.





Objective


Physical Examination


General Exam:  Positive: Cooperative, Other (Fatigued)


Eye Exam:  Positive: EOMI; 


   Negative: Sclera icteric


ENT Exam:  Positive: Atraumatic


Chest Exam:  Positive: Clear to auscultation


Heart Exam:  Positive: Rate Normal, Regular Rhythm


Abdomen Exam:  Positive: Normal bowel sounds, Soft; 


   Negative: Tenderness


Extremity Exam:  Negative: Edema


Neuro Exam:  Positive: Normal Speech


Psych Exam:  Positive: Mental status NL





Assessment /Plan


Assessment


Mr. Christine is a 65 year old male with pulmonary interstitial fibrosis here with 

spontaneous pneumothorax of the right lung. Dr. Hay placed chest tube on 

3/5/2021 and removed chest tube on 3/8/2021. After removal, started to have 

signs of growing pneumothorax. Chest tube reinserted on 3/10/2021. Dr. Hay 

planning for blood patch to clot the air leak either later today or tomorrow.





Plan/VTE


VTE Prophylaxis Ordered?:  Yes





Plan


1. Spontaneous pneumothorax of right lung


-Followed by Dr. Hay


-Placed chest tube on 3/5/2021 and removed chest tube on 3/8/2021


-CXR started to demonstrate signs of growing right pneumothorax. Chest tube 

reinserted on 3/10/2021





2. Diabetes mellitus


-Sliding scale insulin and basal insulin


-On levemir 8units qHS (increased from 6units qHS)





3. Lupus


-Patient was scheduled for Cytoxan on 3/11/2021 per patient's Rheumatologist 

office in [place], [place]


-Dr. Benitez spoke with patient, patient request to defer Cytoxan until he has 

recovered


-Continue with prednisone and Plaquenil


Of note, due to patient's chronic and high-dose steroids, he is not a candidate

for the COVID vaccination at this time





4. GERD


-Continue pantoprazole





5.  DVT ppx


-Heparin subQ





Disposition: Pending clinical improvement. When patient is medically clear, 

patient will need placement. Dr. Hay planning to place blood patch to clot 

the air leak either later today or tomorrow.





VS, I&O, 24H, [place]


Vital Signs/I&O





Vital Signs








  Date Time  Temp Pulse Resp B/P (MAP) Pulse Ox O2 Delivery O2 Flow Rate FiO2


 


3/15/21 16:00 98.2 93 18 91/50 (64) 94 Nasal Cannula 2.0 














I&O- Last 24 Hours up to 6 AM 


 


 3/15/21





 06:00


 


Intake Total 1800 ml


 


Output Total 950 ml


 


Balance 850 ml











Laboratory Data


24H LABS


Laboratory Tests 2


3/14/21 16:50: Bedside Glucose (Misc Panel) 305H


3/14/21 19:13: Bedside Glucose (Misc Panel) 317H


3/15/21 05:43: 


Nucleated Red Blood Cells % (auto) 0.0, Anion Gap , Glomerular Filtration Rate >

60.0, Calcium Level 8.1L


3/15/21 11:28: Bedside Glucose (Misc Panel) 248H


CBC/BMP


Laboratory Tests


3/15/21 05:43








Microbiology





Microbiology


3/5/21 Respiratory Virus Panel (PCR) ([facility]) - Final, Complete











TEO JACKSON 15, 2021 16:47

## 2021-03-15 NOTE — REP
INDICATION:

after central line. 3:09 p.m. film.



COMPARISON:

Comparison portable chest x-ray March 15, 2021 7:43 a.m. film.



TECHNIQUE:

Semi-erect portable AP radiograph..



FINDINGS:

A right apical chest tube is again seen in place.  A right subclavian catheter

terminates in the expected location of the right atrium.  There is a small right

apical pneumothorax again noted unchanged from the earlier film.  Diffuse interstitial

lung disease is noted.  Heart is not enlarged.  There is a large air cyst projecting

at the right cardio phrenic angle.  Unchanged from prior radiographs.



IMPRESSION:

Right central line tip in the expected location of the right atrium.  Small

right-sided pneumothorax with right chest tube in place unchanged from this morning's

radiograph.  Diffuse interstitial lung disease again noted unchanged..





<Electronically signed by Dmitri Adamson > 03/15/21 1472

## 2021-03-16 VITALS — DIASTOLIC BLOOD PRESSURE: 55 MMHG | SYSTOLIC BLOOD PRESSURE: 101 MMHG

## 2021-03-16 VITALS — SYSTOLIC BLOOD PRESSURE: 100 MMHG | DIASTOLIC BLOOD PRESSURE: 60 MMHG

## 2021-03-16 VITALS — DIASTOLIC BLOOD PRESSURE: 56 MMHG | SYSTOLIC BLOOD PRESSURE: 110 MMHG

## 2021-03-16 VITALS — SYSTOLIC BLOOD PRESSURE: 93 MMHG | DIASTOLIC BLOOD PRESSURE: 59 MMHG

## 2021-03-16 VITALS — SYSTOLIC BLOOD PRESSURE: 102 MMHG | DIASTOLIC BLOOD PRESSURE: 59 MMHG

## 2021-03-16 VITALS — SYSTOLIC BLOOD PRESSURE: 114 MMHG | DIASTOLIC BLOOD PRESSURE: 59 MMHG

## 2021-03-16 LAB
BUN SERPL-MCNC: 18 MG/DL (ref 7–18)
CALCIUM SERPL-MCNC: 7.9 MG/DL (ref 8.8–10.2)
CHLORIDE SERPL-SCNC: 100 MEQ/L (ref 98–107)
CO2 SERPL-SCNC: 37 MEQ/L (ref 21–32)
CREAT SERPL-MCNC: 0.52 MG/DL (ref 0.7–1.3)
GFR SERPL CREATININE-BSD FRML MDRD: > 60 ML/MIN/{1.73_M2} (ref 49–?)
GLUCOSE SERPL-MCNC: 97 MG/DL (ref 70–100)
HCT VFR BLD AUTO: 46.2 % (ref 42–52)
HGB BLD-MCNC: 14.3 G/DL (ref 13.5–17.5)
MCH RBC QN AUTO: 29.4 PG (ref 27–33)
MCHC RBC AUTO-ENTMCNC: 31 G/DL (ref 32–36.5)
MCV RBC AUTO: 95.1 FL (ref 80–96)
PLATELET # BLD AUTO: 113 10^3/UL (ref 150–450)
POTASSIUM SERPL-SCNC: 4.2 MEQ/L (ref 3.5–5.1)
RBC # BLD AUTO: 4.86 10^6/UL (ref 4.3–6.1)
SODIUM SERPL-SCNC: 137 MEQ/L (ref 136–145)
WBC # BLD AUTO: 11.4 10^3/UL (ref 4–10)

## 2021-03-16 RX ADMIN — LEVALBUTEROL HYDROCHLORIDE SCH MG: 1.25 SOLUTION, CONCENTRATE RESPIRATORY (INHALATION) at 07:05

## 2021-03-16 RX ADMIN — INSULIN LISPRO SCH UNITS: 100 INJECTION, SOLUTION INTRAVENOUS; SUBCUTANEOUS at 07:16

## 2021-03-16 RX ADMIN — HYDROXYCHLOROQUINE SULFATE SCH MG: 200 TABLET, FILM COATED ENTERAL at 20:31

## 2021-03-16 RX ADMIN — INSULIN LISPRO SCH UNITS: 100 INJECTION, SOLUTION INTRAVENOUS; SUBCUTANEOUS at 20:40

## 2021-03-16 RX ADMIN — DOCUSATE SODIUM SCH MG: 100 CAPSULE, LIQUID FILLED ORAL at 20:43

## 2021-03-16 RX ADMIN — DOCUSATE SODIUM SCH MG: 100 CAPSULE, LIQUID FILLED ORAL at 20:31

## 2021-03-16 RX ADMIN — LEVALBUTEROL HYDROCHLORIDE SCH MG: 1.25 SOLUTION, CONCENTRATE RESPIRATORY (INHALATION) at 01:18

## 2021-03-16 RX ADMIN — SODIUM CHLORIDE, PRESERVATIVE FREE SCH ML: 5 INJECTION INTRAVENOUS at 12:06

## 2021-03-16 RX ADMIN — HYDROXYCHLOROQUINE SULFATE SCH MG: 200 TABLET, FILM COATED ENTERAL at 08:54

## 2021-03-16 RX ADMIN — PANTOPRAZOLE SODIUM SCH MG: 40 TABLET, DELAYED RELEASE ORAL at 08:54

## 2021-03-16 RX ADMIN — MAGNESIUM HYDROXIDE SCH ML: 400 SUSPENSION ORAL at 08:55

## 2021-03-16 RX ADMIN — SODIUM CHLORIDE, PRESERVATIVE FREE SCH ML: 5 INJECTION INTRAVENOUS at 04:51

## 2021-03-16 RX ADMIN — SODIUM CHLORIDE SCH UNITS: 4.5 INJECTION, SOLUTION INTRAVENOUS at 20:32

## 2021-03-16 RX ADMIN — INSULIN LISPRO SCH UNITS: 100 INJECTION, SOLUTION INTRAVENOUS; SUBCUTANEOUS at 16:58

## 2021-03-16 RX ADMIN — INSULIN DETEMIR SCH UNITS: 100 INJECTION, SOLUTION SUBCUTANEOUS at 20:40

## 2021-03-16 RX ADMIN — DOCUSATE SODIUM SCH MG: 100 CAPSULE, LIQUID FILLED ORAL at 08:55

## 2021-03-16 RX ADMIN — LEVALBUTEROL HYDROCHLORIDE SCH MG: 1.25 SOLUTION, CONCENTRATE RESPIRATORY (INHALATION) at 19:34

## 2021-03-16 RX ADMIN — INSULIN LISPRO SCH UNITS: 100 INJECTION, SOLUTION INTRAVENOUS; SUBCUTANEOUS at 12:05

## 2021-03-16 RX ADMIN — LEVALBUTEROL HYDROCHLORIDE SCH MG: 1.25 SOLUTION, CONCENTRATE RESPIRATORY (INHALATION) at 14:10

## 2021-03-16 RX ADMIN — SODIUM CHLORIDE, PRESERVATIVE FREE SCH ML: 5 INJECTION INTRAVENOUS at 20:43

## 2021-03-16 RX ADMIN — SODIUM CHLORIDE SCH UNITS: 4.5 INJECTION, SOLUTION INTRAVENOUS at 08:55

## 2021-03-16 RX ADMIN — ATOVAQUONE SCH MG: 750 SUSPENSION ORAL at 08:55

## 2021-03-16 NOTE — IPNPDOC
Date Seen


The patient was seen on 3/16/21.





Progress Note


HOSPITALIST PROGRESS NOTE DICTATED





If needed urgently,pls call hypertype to stat transcribe Dr. Messina's progress

note job #[id number].





VS, I&O, 24H, Fishbone


Vital Signs/I&O





Vital Signs








  Date Time  Temp Pulse Resp B/P (MAP) Pulse Ox O2 Delivery O2 Flow Rate FiO2


 


3/16/21 07:21 97.4 62 18 100/60 (73) 97 Nasal Cannula 2.0 














I&O- Last 24 Hours up to 6 AM 


 


 3/16/21





 06:00


 


Intake Total 3206 ml


 


Output Total 1995 ml


 


Balance 1211 ml











Laboratory Data


24H LABS


Laboratory Tests 2


3/15/21 11:28: Bedside Glucose (Misc Panel) 248H


3/15/21 17:09: Bedside Glucose (Misc Panel) 315H


3/15/21 20:03: Bedside Glucose (Misc Panel) 366H


3/16/21 05:26: 


Nucleated Red Blood Cells % (auto) 0.0, Anion Gap 0L, Glomerular Filtration Rate

> 60.0, Calcium Level 7.9L


CBC/BMP


Laboratory Tests


3/16/21 05:26

















ABDULLAHI MESSINA MD            Mar 16, 2021 07:34

## 2021-03-16 NOTE — REP
INDICATION:

pneumothorax.



COMPARISON:

Comparison chest radiograph March 15, 2021.



TECHNIQUE:

Two views..



FINDINGS:

A right-sided chest tube is again seen in place with its tip at the apex.  Right

subclavian catheter terminates in the expected location of the right atrium.

Monitoring electrodes are again noted.  There is a small right apical pneumothorax

slightly increased in size from yesterday's radiograph.  Extra thoracic soft tissue

emphysema persists in the axillary soft tissues on the right.  Diffuse interstitial

lung disease is again noted.  No new infiltrate.  Relatively low lung volumes.



IMPRESSION:

Small right apical pneumothorax persists with chest tube in place.  Diffuse

interstitial lung disease unchanged..





<Electronically signed by Dmitri Adamson > 03/16/21 [id number]

## 2021-03-16 NOTE — RO
OPERATIVE NOTE



DATE OF OPERATION: 03/15/2021



PREOPERATIVE DIAGNOSIS: Alveolar pleural fistula, recurrent and continuing.



POSTOPERATIVE DIAGNOSIS: Alveolar pleural fistula, recurrent and continuing.



PROCEDURE:  Intrathoracic blood patch.



SURGEON: Cruz Hay MD



ASSISTANT:  



ANESTHESIA:  



DESCRIPTION OF PROCEDURE: 120 mL of blood was withdrawn from a previously

placed central line.  The chest tube was disconnected and prepped and draped in

the usual sterile fashion. The entire 120 mL of blood was then instilled in the

chest tube. It was clamped and the patient was rolled from side to side to

distribute the blood. The patient tolerated the procedure well. Chest tubes

will be unclamped in four hours.

## 2021-03-16 NOTE — IPN
PROGRESS NOTE



DATE:  03/16/2021



SUBJECTIVE: The patient is seen and examined at the bedside, chart has been

reviewed. No overnight issues per nursing. Patient remains with a chest tube,

currently on a water seal. He complains of pleuritic chest pain rated at 5/10.

No nausea, vomiting, fever, chills or cough. Patient is ambulating well with

some discomfort from bed to the bathroom. No dyspnea. 



OBJECTIVE: 

VITAL SIGNS: Temperature is 97.4, pulse is 62, sinus rhythm, respiratory rate

is 18, blood pressure is 100/60, 97% on 2 liters nasal cannula. 

GENERAL: The patient is awake, alert and oriented x3, answering questions

appropriately. No use of accessory or respiratory muscles. No conversational

dyspnea.

NECK: No JVD. No thyromegaly. No cervical lymphadenopathy. 

LUNGS: Chest tube in place. Lungs are equal bilaterally. Air entry is equal.

Bibasilar crackles.

HEART: S1 and S2, sinus rhythm.

ABDOMEN: Soft, nontender and nondistended.

EXTREMITIES: No cyanosis, clubbing or any pitting edema. 

SKIN: Right triple lumen catheter noted right chest tube. 



LABORATORY DATA: White count 11.4, hemoglobin 14, hematocrit 46, platelet count

113,000. Sodium 137, potassium 4.2, chloride 100, bicarbonate 37, BUN 18,

creatinine 0.52, glucose of 97, respiratory panel negative for Coronavirus. 



Imaging studies: Chest x-ray on 03/15/2021: Right central line tip in the

expected location in the right atrium. Chest x-ray: Small right sided

pneumothorax with right chest tube in place unchanged from this morning's

radiograph, diffuse interstitial lung disease again noted, that is unchanged.



ASSESSMENT AND PLAN: This is a 65-year-old male who has a history of idiopathic

pulmonary fibrosis, systemic lupus erythematous recently diagnosed, non-insulin

diabetes, osteoarthritis, diabetic neuropathy and chronic back pain, nonsmoker,

admitted on 03/05/2021 with complaints of shortness of breath, pleuritic chest

pain and found to have a spontaneous pneumothorax on the right lung. Current

issues are as follows:



  1.  Spontaneous pneumothorax on the right lung status post chest tube

      placement on 3/5 to 3/8. Repeat chest x-ray shows recurrent right

      pneumothorax. Chest tube reinserted by Dr. Cruz Hay on 3/10/2021

      status post blood patch.

  2.  Systemic lupus erythematosus, patient was due for Cytoxan on 03/11/2021.

      Per Dr. Benitez, Cytoxan to be postponed until prednisone is clinically

      stable, currently on prednisone and Plaquenil, outpatient follow-up with

      ophthalmologist for facial exam while on Plaquenil. Patient has chronic

      immunosuppressive therapy, is not a candidate for COVID vaccination at

      this time.

  3.  Gastroesophageal reflux disease on Protonix. 

  4.  Type 2 diabetes on sliding scale Levemir insulin and fingersticks,

      consistent carbohydrate diet.

  5.  Advanced interstitial lung disease, clinically idiopathic pulmonary

      fibrosis, outpatient follow-up with Dr. Benitez, complicating care currently

      being treated for SLE with prednisone and Plaquenil. Outpatient follow-up

      for Cytoxan by his rheumatologist.

  6.  Underlying COPD in a nonsmoker. Follows with Dr. Benitez as an outpatient

      pulmonologist. 

  7.  Alveolar pleural fistula status post chest tube with removal, reinserted

      due to recurrent pneumothorax, managed by thoracic surgeon, Dr. Cruz Hay. 

  8.  Secondary polycythemia secondary to chronic hypoxia from COPD, idiopathic

      pulmonary fibrosis. Outpatient follow-up with pulmonologist. 

  9.  DVT prophylaxis with compression stockings. 

MTDD

## 2021-03-16 NOTE — RO
OPERATIVE NOTE



DATE OF OPERATION: 03/15/2021



PREOPERATIVE DIAGNOSIS: Need for vascular access.



POSTOPERATIVE DIAGNOSIS: Need for vascular access.



PROCEDURE:  Insertion of right subclavian central line.



SURGEON: Cruz Hay MD



ASSISTANT:  



ANESTHESIA:  



DESCRIPTION OF PROCEDURE: The patient's right infraclavicular fossa was prepped

and draped in the usual sterile fashion.  The overlying skin was infiltrated

with 1% Lidocaine and the vein was found on the second pass.  The wire was

placed without difficulty.  The tract was dilated and a triple lumen catheter

was placed.  The catheter was secured to the chest wall with 2-0 silk sutures. 

Ports were aspirated and flushed without difficulty.  The patient tolerated the

procedure well. Chest x-ray is pending.

## 2021-03-16 NOTE — IPN
PROGRESS NOTE



DATE:  03/16/2021



Mr. Christine is now one day status post a blood patch. There is no air leak today,

and he is breathing well. 



His vital signs show a maximum temperature of 98.9 with a heart rate that

ranges between 62-72 in a sinus rhythm, respiratory rate of 16-18 without the

use of accessory muscles, who is 97%-99% saturated on 2 liters nasal cannula,

and whose blood pressure is ranging between 102/59 to 93/59.



His intake and output for the past 24 hours has been recorded as 3206 in and

1775 out, for a positivity of 1400 mL. Chest tube has put out only 20 mL in the

last 10 hours. Weight today is 70.1 kg.



PHYSICAL EXAMINATION: He has equal breath sounds on either side with Velcro

crackles at the end of inspiration. Percussion note is full to the diaphragm.

Cardiac exam is without murmurs, clicks, gallops, or rubs. I cannot feel his

point of maximal impulse (PMI). S1 and S2 are normal. Abdomen is soft and

nontender. Bowel sounds are positive. There is no hepatomegaly. No

costovertebral angle (CVA) tenderness. Extremities show no pretibial edema, no

calf tenderness, no differential swelling of the upper extremities. Skin is

warm, dry, and perfused without cyanosis or mottling, including that of the

nailbeds and knees. Neck is supple. There is no jugular venous distention. No

subcutaneous emphysema. Trachea is midline. Mouth shows the mucous membranes to

be pink and moist. Lips and commissures without lesions. No thrush. Eyes show

his pupils to be equal and reactive. Extraocular motion intact. Sclerae

anicteric. Neurologic shows II-XII intact. Normal gross motor, gross sensation

intact. Gait is not tested. Psychiatric shows him to be awake, alert, and

oriented times three with appropriate mood and affect and conversational.



His white count is 11.4 with a hemoglobin and hematocrit of 14.3 and 46.2,

respectively. Platelet count is 113 and stable. 



Chemistries show normal electrolytes except for an elevated total CO2 of 37,

consistent with his pulmonary fibrosis. BUN and creatinine are 18 and 0.52 with

a glucose of 97 and a calcium of 7.9. 



His chest x-ray today shows the lung more  from the chest wall than it

was yesterday; however, there is no increase in the subcutaneous emphysema.

Chest tube is in good place. Costophrenic angles are sharp, and he has the

reticular infiltrative pattern of his pulmonary fibrosis. 



IMPRESSION: 

1. Spontaneous pneumothorax, right side, recurrent.

2. Advanced interstitial lung disease clinically.

3. Idiopathic pulmonary fibrosis.

4. Underlying chronic obstructive pulmonary disease (COPD).

5. Hypoxic polycythemia.

6. Gastroesophageal reflux disease.

7. Diabetes. 

8. Alveolar pleural fistula.



PLAN AND DISCUSSION: I will continue his chest tubes to water seal. We will

follow his chest x-ray. Hopefully the blood patch will form a matrix to heal

his alveolar pleural fistula.

## 2021-03-17 VITALS — SYSTOLIC BLOOD PRESSURE: 100 MMHG | DIASTOLIC BLOOD PRESSURE: 60 MMHG

## 2021-03-17 VITALS — SYSTOLIC BLOOD PRESSURE: 160 MMHG | DIASTOLIC BLOOD PRESSURE: 54 MMHG

## 2021-03-17 VITALS — SYSTOLIC BLOOD PRESSURE: 109 MMHG | DIASTOLIC BLOOD PRESSURE: 69 MMHG

## 2021-03-17 VITALS — DIASTOLIC BLOOD PRESSURE: 65 MMHG | SYSTOLIC BLOOD PRESSURE: 105 MMHG

## 2021-03-17 VITALS — DIASTOLIC BLOOD PRESSURE: 72 MMHG | SYSTOLIC BLOOD PRESSURE: 90 MMHG

## 2021-03-17 VITALS — DIASTOLIC BLOOD PRESSURE: 55 MMHG | SYSTOLIC BLOOD PRESSURE: 106 MMHG

## 2021-03-17 VITALS — DIASTOLIC BLOOD PRESSURE: 54 MMHG | SYSTOLIC BLOOD PRESSURE: 111 MMHG

## 2021-03-17 LAB
BUN SERPL-MCNC: 19 MG/DL (ref 7–18)
CALCIUM SERPL-MCNC: 8.1 MG/DL (ref 8.8–10.2)
CHLORIDE SERPL-SCNC: 100 MEQ/L (ref 98–107)
CO2 SERPL-SCNC: 40 MEQ/L (ref 21–32)
CREAT SERPL-MCNC: 0.37 MG/DL (ref 0.7–1.3)
GFR SERPL CREATININE-BSD FRML MDRD: > 60 ML/MIN/{1.73_M2} (ref 49–?)
GLUCOSE SERPL-MCNC: 123 MG/DL (ref 70–100)
HCT VFR BLD AUTO: 45.9 % (ref 42–52)
HGB BLD-MCNC: 14.2 G/DL (ref 13.5–17.5)
MCH RBC QN AUTO: 29.5 PG (ref 27–33)
MCHC RBC AUTO-ENTMCNC: 30.9 G/DL (ref 32–36.5)
MCV RBC AUTO: 95.2 FL (ref 80–96)
PLATELET # BLD AUTO: 106 10^3/UL (ref 150–450)
POTASSIUM SERPL-SCNC: 4.2 MEQ/L (ref 3.5–5.1)
RBC # BLD AUTO: 4.82 10^6/UL (ref 4.3–6.1)
SODIUM SERPL-SCNC: 140 MEQ/L (ref 136–145)
WBC # BLD AUTO: 10.9 10^3/UL (ref 4–10)

## 2021-03-17 RX ADMIN — INSULIN LISPRO SCH UNITS: 100 INJECTION, SOLUTION INTRAVENOUS; SUBCUTANEOUS at 08:31

## 2021-03-17 RX ADMIN — PANTOPRAZOLE SODIUM SCH MG: 40 TABLET, DELAYED RELEASE ORAL at 08:29

## 2021-03-17 RX ADMIN — DOCUSATE SODIUM SCH MG: 100 CAPSULE, LIQUID FILLED ORAL at 21:08

## 2021-03-17 RX ADMIN — MAGNESIUM HYDROXIDE SCH ML: 400 SUSPENSION ORAL at 08:38

## 2021-03-17 RX ADMIN — SODIUM CHLORIDE, PRESERVATIVE FREE SCH ML: 5 INJECTION INTRAVENOUS at 21:15

## 2021-03-17 RX ADMIN — ATOVAQUONE SCH MG: 750 SUSPENSION ORAL at 09:05

## 2021-03-17 RX ADMIN — HYDROXYCHLOROQUINE SULFATE SCH MG: 200 TABLET, FILM COATED ENTERAL at 21:08

## 2021-03-17 RX ADMIN — INSULIN LISPRO SCH UNITS: 100 INJECTION, SOLUTION INTRAVENOUS; SUBCUTANEOUS at 17:56

## 2021-03-17 RX ADMIN — LEVALBUTEROL HYDROCHLORIDE SCH MG: 1.25 SOLUTION, CONCENTRATE RESPIRATORY (INHALATION) at 13:43

## 2021-03-17 RX ADMIN — SODIUM CHLORIDE, PRESERVATIVE FREE SCH ML: 5 INJECTION INTRAVENOUS at 13:58

## 2021-03-17 RX ADMIN — DOCUSATE SODIUM SCH MG: 100 CAPSULE, LIQUID FILLED ORAL at 08:38

## 2021-03-17 RX ADMIN — DOCUSATE SODIUM SCH MG: 100 CAPSULE, LIQUID FILLED ORAL at 08:29

## 2021-03-17 RX ADMIN — DOCUSATE SODIUM SCH MG: 100 CAPSULE, LIQUID FILLED ORAL at 21:00

## 2021-03-17 RX ADMIN — Medication PRN UNITS: at 06:22

## 2021-03-17 RX ADMIN — MAGNESIUM HYDROXIDE SCH ML: 400 SUSPENSION ORAL at 08:29

## 2021-03-17 RX ADMIN — INSULIN LISPRO SCH UNITS: 100 INJECTION, SOLUTION INTRAVENOUS; SUBCUTANEOUS at 12:20

## 2021-03-17 RX ADMIN — SODIUM CHLORIDE SCH UNITS: 4.5 INJECTION, SOLUTION INTRAVENOUS at 08:30

## 2021-03-17 RX ADMIN — SODIUM CHLORIDE PRN ML: 9 INJECTION, SOLUTION INTRAMUSCULAR; INTRAVENOUS; SUBCUTANEOUS at 06:22

## 2021-03-17 RX ADMIN — LEVALBUTEROL HYDROCHLORIDE SCH MG: 1.25 SOLUTION, CONCENTRATE RESPIRATORY (INHALATION) at 02:28

## 2021-03-17 RX ADMIN — SODIUM CHLORIDE SCH UNITS: 4.5 INJECTION, SOLUTION INTRAVENOUS at 21:11

## 2021-03-17 RX ADMIN — INSULIN DETEMIR SCH UNITS: 100 INJECTION, SOLUTION SUBCUTANEOUS at 21:09

## 2021-03-17 RX ADMIN — LEVALBUTEROL HYDROCHLORIDE SCH MG: 1.25 SOLUTION, CONCENTRATE RESPIRATORY (INHALATION) at 07:45

## 2021-03-17 RX ADMIN — LEVALBUTEROL HYDROCHLORIDE SCH MG: 1.25 SOLUTION, CONCENTRATE RESPIRATORY (INHALATION) at 20:21

## 2021-03-17 RX ADMIN — INSULIN LISPRO SCH UNITS: 100 INJECTION, SOLUTION INTRAVENOUS; SUBCUTANEOUS at 21:10

## 2021-03-17 RX ADMIN — HYDROXYCHLOROQUINE SULFATE SCH MG: 200 TABLET, FILM COATED ENTERAL at 09:06

## 2021-03-17 RX ADMIN — SODIUM CHLORIDE, PRESERVATIVE FREE SCH ML: 5 INJECTION INTRAVENOUS at 06:03

## 2021-03-17 NOTE — IPNPDOC
Date Seen


The patient was seen on 3/17/21.





Progress Note


Hospitalist progress note dictated.





If urgently needed, pls call hypertype to stat transcribe


progress note dictated by Dr. Messina


Job #[id number]





VS, I&O, 24H, Fishbone


Vital Signs/I&O





Vital Signs








  Date Time  Temp Pulse Resp B/P (MAP) Pulse Ox O2 Delivery O2 Flow Rate FiO2


 


3/17/21 08:28   18   Nasal Cannula 2.0 


 


3/17/21 08:00 97.8   105/65    98


 


3/17/21 08:00  82   98   














I&O- Last 24 Hours up to 6 AM 


 


 3/17/21





 05:59


 


Intake Total 1875 ml


 


Output Total 1715 ml


 


Balance 160 ml











Laboratory Data


24H LABS


Laboratory Tests 2


3/16/21 10:24: 


3/16/21 11:45: Bedside Glucose (Misc Panel) 212H


3/16/21 16:30: Bedside Glucose (Misc Panel) 313H


3/16/21 20:30: Bedside Glucose (Misc Panel) 343H


3/17/21 06:26: 


Nucleated Red Blood Cells % (auto) 0.0, Anion Gap 0L, Glomerular Filtration Rate

> 60.0, Calcium Level 8.1L


3/17/21 08:15: Bedside Glucose (Misc Panel) 111


CBC/BMP


Laboratory Tests


3/17/21 06:26

















ABDULLAHI MESSINA MD            Mar 17, 2021 09:15

## 2021-03-17 NOTE — IPN
PROGRESS NOTE



DATE: 03/17/2021 



SUBJECTIVE: Patient was seen and examined at the bedside. Chart has been

reviewed.  He denies any fever, chills, cough. Complains of pain at the chest

tube site rated at 4/10. No shortness of breath. Patient denies any cough. 



OBJECTIVE: Vital signs: Temperature 97.8, pulse 82 sinus rhythm, respiratory

rate 18, blood pressure 105/65, 98% on 2 liters nasal cannula.  

General: Awake, alert, oriented times 3, answering questions appropriately, no

cyanosis, tracheal deviation, use of respiratory accessory muscles, stridor or

carotid bruits. 

Lungs: Clear to auscultation. Right sided chest tube is noted. Subcutaneous

emphysema along the right lateral chest wall.

Heart: S1 and S2, sinus rhythm. 

Abdomen: Soft, nontender, nondistended.

Extremities: No pitting edema. 



LABORATORY DATA: White count 10.9, hemoglobin 14, hematocrit 45, platelet count

106; admission platelet count was 200. Heparin induced antibody is still

pending. Sodium 140, potassium 4.2, chloride 100, bicarbonate 40, BUN 19,

creatinine 0.37, glucose 123. 



IMAGING STUDIES: 03/17/2021 chest x-ray: Right pneumothorax has increased and

now appears to involve at least 50% of the right hemithorax, right pleural

effusion has an interval change, right thoracostomy tube is unchanged,

subcutaneous emphysema along the right lateral chest wall has increased. 



ASSESSMENT: This is a 65-year-old male with history of idiopathic pulmonary

fibrosis, systemic lupus erythematous recently diagnosed, non-insulin diabetes,

osteoarthritis, diabetic neuropathy and chronic back pain, nonsmoker, admitted

on 03/05/2021 with complaints of pleuritic chest pain, shortness of breath,

found to have a spontaneous pneumothorax on the right lung. 



1.  Spontaneous pneumothorax on the right status post chest tube on 03/05/2021

    to 03/08/2021. Repeat chest x-ray shows recurrent right pneumothorax. Chest

    tube reinserted by Dr. Cruz Hay thoracic surgery on 3/10/2021, status

    post blood patch, still with chest tube, showing persistent increased right

    pneumothorax: Defer to thoracic surgery for further management. 



2.  Systemic lupus erythematosus: Was planned to have Cytoxan on 03/11/2021,

    but postponed since the patient is clinically sick with a spontaneous

    pneumothorax. Currently still no prednisone and Plaquenil. Outpatient

    follow up with ophthalmologist for eye exam while on Plaquenil. 



3.  Reflux: On chronic Protonix. 



4.  Type-2 diabetes: On Levemir insulin, fingersticks, consistent carbohydrate

    diet with insulin coverage as needed. Currently controlled. 



5.  Advanced interstitial lung disease and idiopathic pulmonary fibrosis:

    Managed by pulmonologist Dr. Benitez as outpatient, currently on prednisone

    and Plaquenil for lupus.



6.  Underlying COPD: Follows with pulmonologist. 



7.  Alveolar pleural fistula status post chest tube: Reinserted due to

    recurrent pneumothorax, managed by thoracic surgeon Dr. Hay.



8.  Secondary polycythemia secondary to chronic hypoxia from COPD, idiopathic

    pulmonary fibrosis: Outpatient follow up with his pulmonologist. 



9.  Thrombocytopenia: Admission platelet count was 200 current platelet count

    is 110, currently on Lovenox, awaiting heparin induced antibody assay to

    rule out HIT.



10.  Disposition: Patient is clinically ill still requiring a chest tube

     awaiting pneumothorax to resolve, managed by thoracic surgeon Dr. Cruz Hay.

LENORA

## 2021-03-17 NOTE — REP
INDICATION:

pneumothorax.



COMPARISON:

03/16/2021.



TECHNIQUE:

Upright PA and lateral chest.



FINDINGS:

The right pneumothorax has significantly increased in size and now appears to be

approximately 50% of the volume of the left hemithorax.

I suspect there is a right pleural effusion.  This appears to be an interval change.

There is a right thoracotomy tube with the tip in the apex of the right hemithorax,

unchanged.

There is subcutaneous emphysema along the right lateral chest wall that appears to

have increased in not extends into the right side of the neck.

Right lung is clear.  Cardiac size is normal.

The right subclavian central venous catheter is unchanged with the tip terminating in

the right atrium.





IMPRESSION:

The right pneumothorax has significantly increased and now appears to involve at least

50% of the right hemithorax.

There is a right pleural effusion as an interval change.

The right thoracotomy tube is unchanged.

The subcutaneous emphysema along the right lateral chest wall has increased.





<Electronically signed by Richard Junior > 03/17/21 0752

## 2021-03-17 NOTE — IPN
PROGRESS NOTE



DATE:  03/17/2021



Mr. Christine states he is doing well. He is breathing well. It is surprising that

his lung is now completely down again on chest x-ray. 



His vital signs show a maximum temperature of 98.5 with a heart rate that

ranges between 74-77 in a sinus rhythm, respiratory rate that is constant at

18, who is 97% saturated on 2 liters nasal cannula, and whose blood pressure is

ranging between 109/69 to 106/55.



His intake and output for the past 24 hours has been recorded as 1875 in and

1735 out, for a positivity of 140 mL. He has put out 60 mL from the chest tube.

He weighs 71.1 kg today compared to 70.1 kg yesterday.



PHYSICAL EXAMINATION: He has markedly decreased breath sounds on the right side

with a hyperresonant percussion note on the right side. Left side shows late

inspiratory Velcro crackles. Percussion is full to the diaphragm on the left.

Cardiac exam is without murmurs, clicks, gallops, or rubs.  I cannot feel his

point of maximal impulse (PMI). S1 and S2 are normal. Abdomen is soft and

nontender. Bowel sounds are positive. There is no hepatomegaly. No

costovertebral angle (CVA) tenderness. Extremities show no pretibial edema, no

calf tenderness, no differential swelling of the upper extremities. Skin is

warm, dry, and perfused without cyanosis or mottling, including that of the

nailbeds and knees. Neck is supple. There is no jugular venous distention. No

subcutaneous emphysema. Trachea is midline. Mouth shows the mucous membranes to

be pink and moist. Lips and commissures without lesions. No thrush. Eyes show

his pupils to be equal and reactive. Extraocular motion intact. Sclerae

anicteric. Neurologic shows II-XII intact. Normal gross motor, gross sensation

intact. Gait is not tested. Psychiatric shows him to be awake, alert, and

oriented times three with appropriate mood and affect and conversational.



His white count today is 10.9 with hemoglobin and hematocrit 14.2 and 45.9,

respectively, with a platelet count of 106. There is no differential.



His chemistries show normal electrolytes except for an elevated total CO2 of

40. BUN and creatinine are 19 and 0.37 with a calcium of 8.1 and a glucose of

123. 



His chest x-ray, as noted above, shows his lungs now again collapsed with a 70%

pneumothorax. Chest tube is still in good place. There is an air/fluid level.

There is increased subcutaneous emphysema along the lateral chest wall now.



IMPRESSION: 

1. Spontaneous pneumothorax, right side, recurrent.

2. Advanced interstitial lung disease, clinically idiopathic pulmonary fibrosis.

3. Underlying chronic obstructive pulmonary disease.

4. Hypoxic polycythemia.

5. Gastroesophageal reflux disease.

6. Diabetes. 

7. Alveolar pleural fistula, continuing.



PLAN AND DISCUSSION: I again will place a chest tube back on -40 of suction.

When I did do that this morning, I did not see a gush of air, and I have a

sinking feeling that the chest tube itself is blocked from the blood patch. I

will get a chest x-ray at 12 o'clock this afternoon to see if the lung is

coming back up on suction. 

LENORA

## 2021-03-17 NOTE — IPN
PROGRESS NOTE



DATE:  03/14/2021



SUBJECTIVE: Mr. Christine is feeling fairly well today.  His chest tube has been on

40 cm of suction and there is no air leak.  His chest x-ray does not show the

lung having moved from where it was yesterday.  



His vital signs show a T-max of 97.8 with a heart rate that ranges between 76

and 82 in a sinus rhythm, respiratory rate of 16-18 without the use of

accessory muscles. He is 95-98% saturated on 2 liters nasal cannula.  He has

blood pressures ranging between 101/69 to 98/61. 



OBJECTIVE:  

His lungs are remarkably clear.  I do not hear any Velcro crackles today. 

Breath sounds are equal on either side. Percussion notes are full through the

diaphragm.

Cardiac exam is without murmurs, clicks, gallops, or rubs. I cannot feel his

PMI. S1, S2 are normal. 

Abdomen is soft, nontender. Bowel sounds are positive. There is no

hepatomegaly. No CVA tenderness.  

Extremities show no pretibial edema, no calf tenderness, no differential

swelling of the upper extremities. 

Skin is warm and dry and perfused without cyanosis or mottling including that

of nailbeds and knees.  



His chest x-ray today had to be repeated as there were numerous bars over the

right cupula.  Repeat chest x-ray shows his lung  from the chest wall

by 1 cm which is unchanged from yesterday.  Subcutaneous emphysema is

dissipating.  Chest tube is in excellent position at the cupula.  



ASSESSMENT: 

1.  Spontaneous pneumothorax right side, recurrent.|

2.  Advanced interstitial lung disease clinically idiopathic pulmonary fibrosis.

3.  Underlying chronic obstructive pulmonary disease. 

4.  Hypoxic polycythemia.

5.  Gastroesophageal reflux disease. 

6.  Diabetes. 

7.  Alveolar pleural fistula, hopefully resolved.



PLAN/DISCUSSION:  I will decrease his chest tube suction to 20 a day and again

monitor his chest x-rays.  If the lung stays at its present position away from

the chest wall by only 1 cm I will be satisfied as his lung is very

noncompliant secondary to his pulmonary fibrosis. It may never re-expand again.

The issue is going to be if this is going to be recurrent from his advanced

cystic disease.  

[facility]

## 2021-03-17 NOTE — REP
INDICATION:

pneuothx.



COMPARISON:

PA AND LATERAL CHEST PERFORMED EARLIER TODAY.



TECHNIQUE:

Upright PA and lateral chest.



FINDINGS:

THERE HAS BEEN SIGNIFICANT REDUCTION OF THE LARGE RIGHT PNEUMOTHORAX ON THE COMPARISON

STUDY.  ON THE CURRENT STUDY THERE IS A SMALL SLIVER OF PNEUMOTHORAX IN THE RIGHT APEX.

THE RIGHT THORACOTOMY TUBE IS UNCHANGED.

THE SUBCUTANEOUS EMPHYSEMA ALONG THE RIGHT LATERAL CHEST WALL AND BASE OF THE NECK ON

THE RIGHT IS UNCHANGED.

THERE IS DIFFUSE BILATERAL INTERSTITIAL COARSENING.  THIS COULD BE CHRONIC, ACUTE OR

COMBINATION.

THERE ARE NO PLEURAL EFFUSIONS.

CARDIAC SIZE IS UPPER NORMAL.

THERE IS A RIGHT SUBCLAVIAN CENTRAL VENOUS CATHETER WITH THE TIP IN THE RIGHT ATRIUM,

UNCHANGED.





IMPRESSION:

SIGNIFICANT REDUCTION IN SIZE OF THE RIGHT PNEUMOTHORAX.  THERE IS ONLY A SMALL SLIVER

OF PNEUMOTHORAX IN THE RIGHT APEX.





<Electronically signed by Richard Junior > 03/17/21 2154

## 2021-03-18 VITALS — DIASTOLIC BLOOD PRESSURE: 63 MMHG | SYSTOLIC BLOOD PRESSURE: 106 MMHG

## 2021-03-18 VITALS — SYSTOLIC BLOOD PRESSURE: 104 MMHG | DIASTOLIC BLOOD PRESSURE: 71 MMHG

## 2021-03-18 VITALS — DIASTOLIC BLOOD PRESSURE: 66 MMHG | SYSTOLIC BLOOD PRESSURE: 101 MMHG

## 2021-03-18 VITALS — DIASTOLIC BLOOD PRESSURE: 60 MMHG | SYSTOLIC BLOOD PRESSURE: 118 MMHG

## 2021-03-18 VITALS — SYSTOLIC BLOOD PRESSURE: 102 MMHG | DIASTOLIC BLOOD PRESSURE: 63 MMHG

## 2021-03-18 VITALS — SYSTOLIC BLOOD PRESSURE: 106 MMHG | DIASTOLIC BLOOD PRESSURE: 61 MMHG

## 2021-03-18 LAB
BUN SERPL-MCNC: 21 MG/DL (ref 7–18)
CALCIUM SERPL-MCNC: 7.7 MG/DL (ref 8.8–10.2)
CHLORIDE SERPL-SCNC: 101 MEQ/L (ref 98–107)
CO2 SERPL-SCNC: 42 MEQ/L (ref 21–32)
CREAT SERPL-MCNC: 0.36 MG/DL (ref 0.7–1.3)
GFR SERPL CREATININE-BSD FRML MDRD: > 60 ML/MIN/{1.73_M2} (ref 49–?)
GLUCOSE SERPL-MCNC: 121 MG/DL (ref 70–100)
HCT VFR BLD AUTO: 43.8 % (ref 42–52)
HGB BLD-MCNC: 13.6 G/DL (ref 13.5–17.5)
MCH RBC QN AUTO: 30 PG (ref 27–33)
MCHC RBC AUTO-ENTMCNC: 31.1 G/DL (ref 32–36.5)
MCV RBC AUTO: 96.7 FL (ref 80–96)
PLATELET # BLD AUTO: 106 10^3/UL (ref 150–450)
POTASSIUM SERPL-SCNC: 4.3 MEQ/L (ref 3.5–5.1)
RBC # BLD AUTO: 4.53 10^6/UL (ref 4.3–6.1)
SODIUM SERPL-SCNC: 141 MEQ/L (ref 136–145)
WBC # BLD AUTO: 10.5 10^3/UL (ref 4–10)

## 2021-03-18 RX ADMIN — SODIUM CHLORIDE, PRESERVATIVE FREE SCH ML: 5 INJECTION INTRAVENOUS at 05:31

## 2021-03-18 RX ADMIN — MAGNESIUM HYDROXIDE SCH ML: 400 SUSPENSION ORAL at 08:25

## 2021-03-18 RX ADMIN — INSULIN LISPRO SCH UNITS: 100 INJECTION, SOLUTION INTRAVENOUS; SUBCUTANEOUS at 12:07

## 2021-03-18 RX ADMIN — INSULIN DETEMIR SCH UNITS: 100 INJECTION, SOLUTION SUBCUTANEOUS at 21:31

## 2021-03-18 RX ADMIN — HYDROXYCHLOROQUINE SULFATE SCH MG: 200 TABLET, FILM COATED ENTERAL at 08:26

## 2021-03-18 RX ADMIN — INSULIN DETEMIR SCH UNITS: 100 INJECTION, SOLUTION SUBCUTANEOUS at 08:27

## 2021-03-18 RX ADMIN — SODIUM CHLORIDE SCH UNITS: 4.5 INJECTION, SOLUTION INTRAVENOUS at 21:32

## 2021-03-18 RX ADMIN — INSULIN LISPRO SCH UNITS: 100 INJECTION, SOLUTION INTRAVENOUS; SUBCUTANEOUS at 21:32

## 2021-03-18 RX ADMIN — SODIUM CHLORIDE, PRESERVATIVE FREE SCH ML: 5 INJECTION INTRAVENOUS at 22:09

## 2021-03-18 RX ADMIN — LEVALBUTEROL HYDROCHLORIDE SCH MG: 1.25 SOLUTION, CONCENTRATE RESPIRATORY (INHALATION) at 07:43

## 2021-03-18 RX ADMIN — Medication PRN UNITS: at 16:12

## 2021-03-18 RX ADMIN — LEVALBUTEROL HYDROCHLORIDE SCH MG: 1.25 SOLUTION, CONCENTRATE RESPIRATORY (INHALATION) at 13:24

## 2021-03-18 RX ADMIN — DOCUSATE SODIUM SCH MG: 100 CAPSULE, LIQUID FILLED ORAL at 21:00

## 2021-03-18 RX ADMIN — SODIUM CHLORIDE, PRESERVATIVE FREE SCH ML: 5 INJECTION INTRAVENOUS at 14:00

## 2021-03-18 RX ADMIN — DOCUSATE SODIUM SCH MG: 100 CAPSULE, LIQUID FILLED ORAL at 08:25

## 2021-03-18 RX ADMIN — INSULIN LISPRO SCH UNITS: 100 INJECTION, SOLUTION INTRAVENOUS; SUBCUTANEOUS at 08:27

## 2021-03-18 RX ADMIN — SODIUM CHLORIDE SCH UNITS: 4.5 INJECTION, SOLUTION INTRAVENOUS at 08:27

## 2021-03-18 RX ADMIN — INSULIN LISPRO SCH UNITS: 100 INJECTION, SOLUTION INTRAVENOUS; SUBCUTANEOUS at 16:11

## 2021-03-18 RX ADMIN — LEVALBUTEROL HYDROCHLORIDE SCH MG: 1.25 SOLUTION, CONCENTRATE RESPIRATORY (INHALATION) at 19:34

## 2021-03-18 RX ADMIN — HYDROXYCHLOROQUINE SULFATE SCH MG: 200 TABLET, FILM COATED ENTERAL at 21:35

## 2021-03-18 RX ADMIN — PANTOPRAZOLE SODIUM SCH MG: 40 TABLET, DELAYED RELEASE ORAL at 08:26

## 2021-03-18 RX ADMIN — ATOVAQUONE SCH MG: 750 SUSPENSION ORAL at 08:27

## 2021-03-18 RX ADMIN — SODIUM CHLORIDE PRN ML: 9 INJECTION, SOLUTION INTRAMUSCULAR; INTRAVENOUS; SUBCUTANEOUS at 16:12

## 2021-03-18 RX ADMIN — LEVALBUTEROL HYDROCHLORIDE SCH MG: 1.25 SOLUTION, CONCENTRATE RESPIRATORY (INHALATION) at 02:00

## 2021-03-18 NOTE — IPN
PROGRESS NOTE



DATE:  03/05/2021



SUBJECTIVE: Patient seen and examined at the bedside.  Chart has been reviewed.

Yesterday the patient had increasing pneumothorax thought to be secondary to

plugging of the chest tube with a blood patch.  Repeat chest x-ray, however,

after readjustment was much improved with resolution of the right-sided

pneumothorax managed by Dr. Hay.  This morning he denies any chest pain,

pressure, or tightness, lightheadedness, dizziness, shortness of breath, fever,

chills, or cough.  Pain is controlled.  He does not want additional pain

medications.  Review of 24-hour sugars have been uncontrolled, 209-354. The

patient has been kept on a consistent carbohydrate diet. Levemir insulin has

been increased from 8 units once daily at bedtime to twice a day dosing of 6

units twice a day with holding parameters for glucose less than 180. 



OBJECTIVE:  

Vital signs: Temperature 97.8, pulse 70, respiratory rate 18, blood pressure

104/71, 99% on 2 liters nasal cannula. 

Generally the patient has no acute respiratory distress, able to speak in full

sentences without any difficulty. 

No nasal flaring, no tracheal deviation, right-sided chest tube in place.

Lungs are clear bilaterally. 

Heart: S1, S2, sinus rhythm. 

Abdomen is soft, nontender, nondistended. 

Extremities: No pitting edema.



LABORATORY DATA:  White count 10, hemoglobin 13, hematocrit 43, platelet count

106.  Previous platelet count 106. Admission platelet count was 200. HIT panel

still pending.  Sodium 141, potassium 4.6, chloride 101, bicarb 42, BUN 21,

creatinine 0.37, glucose of 121.



Imaging studies: Chest x-ray 03/17/2021: Reduction in size of right

pneumothorax, only a small sliver of pneumothorax in the right apex.



ASSESSMENT AND PLAN:  This is a 65-year-old DO NOT RESUSCITATE/DO NOT INTUBATE

patient with idiopathic pulmonary fibrosis, systemic lupus erythematosus

recently diagnosed with insulin dependent diabetes, osteoarthritis, diabetic

neuropathy, chronic back pain, nonsmoker admitted on 03/05 with shortness of

breath, pleuritic chest pain, found to have a spontaneous pneumothorax of the

right lung.



IMPRESSION:  

1.  Spontaneous pneumothorax right status post chest tube 03/05 to 03/08 with

    repeat recurrent right pneumothorax. Chest tube reinserted on 03/10, status

    post blood patch on 03/17.  The patient had increasing pneumothorax most

    likely due to the blood patch plugging the chest tube. This was readjusted

    and repeat chest x-ray shows significant improvement with only a small

    sliver of apical right pneumothorax. 

2.  Type-2 diabetes, uncontrolled.  Glucose level of 300.  The patient's

    Levemir Insulin has been adjusted to twice a day dosing at 6 units with

    holding parameters for glucose less than 180.

3.  Systemic lupus erythematosus and idiopathic pulmonary fibrosis managed by

    pulmonologist and rheumatologist as outpatient.  The patient was planned to

    have Cytoxan on 03/11 but postponed due to the patient's clinical

    decompensation. The patient is still taking Prednisone and Plaquenil. 

    Outpatient followup with ophthalmologist yearly while he is on Plaquenil.  

4.  Reflux on PPI. 

5.  Underlying COPD.  Pulmonary followup with Dr. Benitez as outpatient. 

6.  Alveolar pleural fistula status post chest tube reinserted again and

    readjusted on 03/17 managed by thoracic surgeon.

7.  Secondary polycythemia due to chronic hypoxia from idiopathic pulmonary

    fibrosis.

8.  Thrombocytopenia with admission platelet count of 200.  Current platelet

    count of 106.  Awaiting Heparin-induced antibody assay to rule out

    Heparin-induced thrombocytopenia. 

9.  Disposition:  Awaiting removal of his chest tube and clinical improvement.

[facility]

## 2021-03-18 NOTE — IPN
PROGRESS NOTE



DATE:  03/18/2021



SUBJECTIVE: Mr. Christine lungs have now re-expanded to the chest wall. He has no

air leak once again on 40 cm of suction. 



OBJECTIVE: 

VITAL SIGNS: Show a T-max of 98.1 with a heart rate that ranges between 65 and

81 in sinus rhythm. Respiratory rate of 16 to 18 without the use of accessory

muscles who is 975 to 99% saturated on 2 liters nasal cannula and whose blood

pressure is ranging between 118/60 to 101/66. 

INTAKE AND OUTPUT: Over the past 24 hours has been recorded as 2002 in and 1070

out for a positivity 930 mL. He has put 70 mL out the chest tube and there is

no air leak once again even with forceful coughing. Weight today is 69 kg

compared to 71.1 kg yesterday. 

RESPIRATORY: He has equal breath sounds on either side. He also has again the

late inspiratory velcro rales on either side. Percussion notes are full to the

diaphragm. 

CARDIAC: Without murmurs, clicks, gallops, or rubs. I cannot feel his PMI. S1

and S2 are normal. 

ABDOMEN: Soft and nontender. Bowel sounds are positive. There is no

hepatomegaly. No CVA tenderness. 

EXTREMITIES: Show no pretibial edema. No calf tenderness. No differential

swelling of the upper extremities. 

SKIN: Warm, dry, and perfused without cyanosis or mottling, including that of

the nail beds and knees. 

NECK: Supple. There is no jugular venous distention. No subcutaneous emphysema.

Trachea is midline.

MOUTH: Shows the mucous membranes to be pink and moist. Lips and commisures are

without lesions and no thrush.

EYES: Show his pupils equal and reactive. Extraocular muscles are intact.

Sclerae nonicteric. 

NEUROLOGIC: Shows II through XII intact. Normal gross motor, gross sensation

intact. Gait is not tested. 

PSYCHIATRIC: Shows him to be awake, alert, and oriented x3 with appropriate

mood and affect and conversational. 



LABORATORY DATA: His white count today is 10.5 with hemoglobin and hematocrit

of 13.6 and 43.8 respectively. Platelet count is 106,000 and stable. 



His chemistries today show normal electrolytes with a total of CO2, however, of

42. BUN and creatinine are 21 and 0.38, base excess 7.7. 



IMAGING DATA: His chest x-ray today shows again his lungs fully expand to the

chest wall. There is increased subcutaneous emphysema on the right lateral

chest wall. There is increased subcutaneous emphysema on the right lateral

chest wall. There may be a small apical air space more laterally, however. 



IMPRESSION:

1.  Spontaneous pneumothorax right side, recurrent.

2.  Advanced interstitial lung disease, clinically idiopathic pulmonary

    fibrosis. 

3.  Underlying chronic obstructive pulmonary disease.

4.  Hypoxic polycythemia. 

5.  Gastroesophageal reflux disease. 

6.  Diabetes. 

7.  Alveolopleural fistula, intermittent. 



PLAN AND DISCUSSION: It is rather frustrating that the alveolopleural fistula

is intermittent and yet, drops the lungs significantly when the chest tube is

clamped or even placed on water seal. We are again at the same juncture we were

a few days ago with his lungs fully expand to the chest wall with the

intermittent leak. I will give him another round of decreasing chest tube

suction and clamping. I may consider a talc pleurodesis, although it is going

to be very difficult secondary to his pulmonary fibrosis and inability to

probably tolerate one lung anesthesia. My other option would be a talc slurry,

although those notoriously do not coat the chest wall uniformly. I am not sure

if I will ever seal an air leak, however, at operation as he has hundreds of

subpleural cystic spaces.

## 2021-03-18 NOTE — REP
INDICATION:

pneumothorax.



COMPARISON:

03/17/2021



TECHNIQUE:

There are two views.



FINDINGS:

.

There is a small right apical pneumothorax, unchanged.

The right thoracotomy tube is unchanged.

The subcutaneous emphysema along the right lateral chest wall has decreased.

Diffuse interstitial coarsening is again noted, unchanged.  There is a large bulla in

the medial basilar segment of the right lower lobe, unchanged.

The right subclavian central venous catheter remains in satisfactory position,

unchanged.





IMPRESSION:

The subcutaneous emphysema along the right lateral chest wall has decreased.

Otherwise, no significant interval change.





<Electronically signed by Richard Junior > 03/18/21 0739

## 2021-03-18 NOTE — IPNPDOC
Date Seen


The patient was seen on 3/18/21.





Progress Note


Hospitalist progress note dictated.





If note is needed urgently, pls call hypertype or medical records to STAT 

transcribe Dr. Messina's progress note job# [id number].





VS, I&O, 24H, Fishbone


Vital Signs/I&O





Vital Signs








  Date Time  Temp Pulse Resp B/P (MAP) Pulse Ox O2 Delivery O2 Flow Rate FiO2


 


3/18/21 04:15       2.0 


 


3/18/21 04:00 97.8 70 18 104/71 (82) 99 Nasal Cannula  


 


3/17/21 08:00        98














I&O- Last 24 Hours up to 6 AM 


 


 3/18/21





 06:00


 


Intake Total 2002 ml


 


Output Total 1100 ml


 


Balance 902 ml











Laboratory Data


24H LABS


Laboratory Tests 2


3/17/21 08:15: Bedside Glucose (Misc Panel) 111


3/17/21 11:22: Bedside Glucose (Misc Panel) 209H


3/17/21 16:13: Bedside Glucose (Misc Panel) 307H


3/17/21 20:58: Bedside Glucose (Misc Panel) 354H


3/18/21 05:20: 


Anion Gap , Glomerular Filtration Rate > 60.0, Calcium Level 7.7L


3/18/21 05:24: Nucleated Red Blood Cells % (auto) 0.0


CBC/BMP


Laboratory Tests


3/18/21 05:20








3/18/21 05:24

















ABDULLAHI MESSINA MD            Mar 18, 2021 07:55

## 2021-03-19 VITALS — SYSTOLIC BLOOD PRESSURE: 99 MMHG | DIASTOLIC BLOOD PRESSURE: 60 MMHG

## 2021-03-19 VITALS — DIASTOLIC BLOOD PRESSURE: 67 MMHG | SYSTOLIC BLOOD PRESSURE: 101 MMHG

## 2021-03-19 VITALS — DIASTOLIC BLOOD PRESSURE: 59 MMHG | SYSTOLIC BLOOD PRESSURE: 96 MMHG

## 2021-03-19 VITALS — SYSTOLIC BLOOD PRESSURE: 94 MMHG | DIASTOLIC BLOOD PRESSURE: 56 MMHG

## 2021-03-19 VITALS — SYSTOLIC BLOOD PRESSURE: 96 MMHG | DIASTOLIC BLOOD PRESSURE: 60 MMHG

## 2021-03-19 VITALS — SYSTOLIC BLOOD PRESSURE: 103 MMHG | DIASTOLIC BLOOD PRESSURE: 55 MMHG

## 2021-03-19 VITALS — DIASTOLIC BLOOD PRESSURE: 54 MMHG | SYSTOLIC BLOOD PRESSURE: 86 MMHG

## 2021-03-19 VITALS — DIASTOLIC BLOOD PRESSURE: 56 MMHG | SYSTOLIC BLOOD PRESSURE: 98 MMHG

## 2021-03-19 VITALS — DIASTOLIC BLOOD PRESSURE: 56 MMHG | SYSTOLIC BLOOD PRESSURE: 95 MMHG

## 2021-03-19 LAB
BUN SERPL-MCNC: 17 MG/DL (ref 7–18)
CALCIUM SERPL-MCNC: 7.9 MG/DL (ref 8.8–10.2)
CHLORIDE SERPL-SCNC: 101 MEQ/L (ref 98–107)
CO2 SERPL-SCNC: 40 MEQ/L (ref 21–32)
CREAT SERPL-MCNC: 0.31 MG/DL (ref 0.7–1.3)
GFR SERPL CREATININE-BSD FRML MDRD: > 60 ML/MIN/{1.73_M2} (ref 49–?)
GLUCOSE SERPL-MCNC: 56 MG/DL (ref 70–100)
HCT VFR BLD AUTO: 44.6 % (ref 42–52)
HGB BLD-MCNC: 13.4 G/DL (ref 13.5–17.5)
MCH RBC QN AUTO: 29.1 PG (ref 27–33)
MCHC RBC AUTO-ENTMCNC: 30 G/DL (ref 32–36.5)
MCV RBC AUTO: 96.7 FL (ref 80–96)
PLATELET # BLD AUTO: 118 10^3/UL (ref 150–450)
POTASSIUM SERPL-SCNC: 4.1 MEQ/L (ref 3.5–5.1)
RBC # BLD AUTO: 4.61 10^6/UL (ref 4.3–6.1)
SODIUM SERPL-SCNC: 142 MEQ/L (ref 136–145)
WBC # BLD AUTO: 9 10^3/UL (ref 4–10)

## 2021-03-19 PROCEDURE — 3E0S3GC INTRODUCTION OF OTHER THERAPEUTIC SUBSTANCE INTO EPIDURAL SPACE, PERCUTANEOUS APPROACH: ICD-10-PCS | Performed by: THORACIC SURGERY (CARDIOTHORACIC VASCULAR SURGERY)

## 2021-03-19 RX ADMIN — ATOVAQUONE SCH MG: 750 SUSPENSION ORAL at 09:46

## 2021-03-19 RX ADMIN — INSULIN LISPRO SCH UNITS: 100 INJECTION, SOLUTION INTRAVENOUS; SUBCUTANEOUS at 22:00

## 2021-03-19 RX ADMIN — INSULIN LISPRO SCH UNITS: 100 INJECTION, SOLUTION INTRAVENOUS; SUBCUTANEOUS at 12:58

## 2021-03-19 RX ADMIN — SODIUM CHLORIDE, PRESERVATIVE FREE SCH ML: 5 INJECTION INTRAVENOUS at 05:30

## 2021-03-19 RX ADMIN — DOCUSATE SODIUM SCH MG: 100 CAPSULE, LIQUID FILLED ORAL at 09:00

## 2021-03-19 RX ADMIN — INSULIN LISPRO SCH UNITS: 100 INJECTION, SOLUTION INTRAVENOUS; SUBCUTANEOUS at 07:30

## 2021-03-19 RX ADMIN — HYDROXYCHLOROQUINE SULFATE SCH MG: 200 TABLET, FILM COATED ENTERAL at 21:58

## 2021-03-19 RX ADMIN — MIDODRINE HYDROCHLORIDE SCH MG: 5 TABLET ORAL at 18:30

## 2021-03-19 RX ADMIN — SODIUM CHLORIDE SCH UNITS: 4.5 INJECTION, SOLUTION INTRAVENOUS at 09:46

## 2021-03-19 RX ADMIN — SODIUM CHLORIDE PRN ML: 9 INJECTION, SOLUTION INTRAMUSCULAR; INTRAVENOUS; SUBCUTANEOUS at 05:27

## 2021-03-19 RX ADMIN — LEVALBUTEROL HYDROCHLORIDE SCH MG: 1.25 SOLUTION, CONCENTRATE RESPIRATORY (INHALATION) at 13:46

## 2021-03-19 RX ADMIN — Medication PRN UNITS: at 05:30

## 2021-03-19 RX ADMIN — PANTOPRAZOLE SODIUM SCH MG: 40 TABLET, DELAYED RELEASE ORAL at 09:46

## 2021-03-19 RX ADMIN — SODIUM CHLORIDE PRN ML: 9 INJECTION, SOLUTION INTRAMUSCULAR; INTRAVENOUS; SUBCUTANEOUS at 10:30

## 2021-03-19 RX ADMIN — INSULIN DETEMIR SCH UNITS: 100 INJECTION, SOLUTION SUBCUTANEOUS at 21:59

## 2021-03-19 RX ADMIN — DOCUSATE SODIUM SCH MG: 100 CAPSULE, LIQUID FILLED ORAL at 21:00

## 2021-03-19 RX ADMIN — SODIUM CHLORIDE, PRESERVATIVE FREE SCH ML: 5 INJECTION INTRAVENOUS at 14:00

## 2021-03-19 RX ADMIN — LEVALBUTEROL HYDROCHLORIDE SCH MG: 1.25 SOLUTION, CONCENTRATE RESPIRATORY (INHALATION) at 01:36

## 2021-03-19 RX ADMIN — SODIUM CHLORIDE SCH UNITS: 4.5 INJECTION, SOLUTION INTRAVENOUS at 22:00

## 2021-03-19 RX ADMIN — SODIUM CHLORIDE, PRESERVATIVE FREE SCH ML: 5 INJECTION INTRAVENOUS at 22:00

## 2021-03-19 RX ADMIN — INSULIN LISPRO SCH UNITS: 100 INJECTION, SOLUTION INTRAVENOUS; SUBCUTANEOUS at 18:35

## 2021-03-19 RX ADMIN — INSULIN DETEMIR SCH UNITS: 100 INJECTION, SOLUTION SUBCUTANEOUS at 09:00

## 2021-03-19 RX ADMIN — LEVALBUTEROL HYDROCHLORIDE SCH MG: 1.25 SOLUTION, CONCENTRATE RESPIRATORY (INHALATION) at 07:14

## 2021-03-19 RX ADMIN — MIDODRINE HYDROCHLORIDE SCH MG: 5 TABLET ORAL at 14:40

## 2021-03-19 RX ADMIN — MAGNESIUM HYDROXIDE SCH ML: 400 SUSPENSION ORAL at 09:00

## 2021-03-19 RX ADMIN — HYDROXYCHLOROQUINE SULFATE SCH MG: 200 TABLET, FILM COATED ENTERAL at 09:46

## 2021-03-19 RX ADMIN — LEVALBUTEROL HYDROCHLORIDE SCH MG: 1.25 SOLUTION, CONCENTRATE RESPIRATORY (INHALATION) at 19:42

## 2021-03-19 NOTE — REP
INDICATION:

pneumothorax.



COMPARISON:

Comparison chest x-ray March 18, 2021.



TECHNIQUE:

Two views..



FINDINGS:

A right apical chest tube remains in place.  There is a tiny sliver of apical

pneumothorax unchanged.  Right subclavian central venous line is seen with its tip in

the expected location of the right atrium.  There is a air density projecting over the

right heart border in the right cardiophrenic angle unchanged consistent with a large

bolus.  This is visible on CT study from March 6, 2021.  Diffuse interstitial lung

disease is noted right lung greater than left.  The pleural angles are sharp.



IMPRESSION:

Findings essentially unchanged from the previous day's radiograph..





<Electronically signed by Dmitri Adamson > 03/19/21 [id number]

## 2021-03-19 NOTE — RO
OPERATIVE NOTE



DATE OF OPERATION: 03/19/2021



PREOPERATIVE DIAGNOSIS: 

Alveolar bronchopleural fistula.



POSTOPERATIVE DIAGNOSIS: 

Alveolar bronchopleural fistula.



PROCEDURE: Intrathoracic blood patch.



SURGEON: Dr. Hay



DESCRIPTION OF PROCEDURE: Patient already had a central line placed. Chest tube

was disconnected from the Pleur-evac and sterilely prepped and draped. There

was 120 mL of blood withdrawn from the central line and instilled into the

chest. Chest tube was again clamped, and the patient was turned from side to

side and then placed in deep Trendelenburg for 15 minutes. Chest tube will be

unclamped in 4 hours and placed under water seal once again. Patient tolerated

the procedure well.

## 2021-03-19 NOTE — IPNPDOC
Date Seen


The patient was seen on 3/19/21.





Progress Note


Hospitalist progress note dictated job #[id number]. If urgently needed, pls call 

hypertype for STAT transcription.





VS, I&O, 24H, Fishbone


Vital Signs/I&O





Vital Signs








  Date Time  Temp Pulse Resp B/P (MAP) Pulse Ox O2 Delivery O2 Flow Rate FiO2


 


3/19/21 04:05       2.0 


 


3/19/21 04:00 97.6 61 18 96/59 (71) 99 Nasal Cannula  


 


3/17/21 08:00        98














I&O- Last 24 Hours up to 6 AM 


 


 3/19/21





 06:00


 


Intake Total 1520 ml


 


Output Total 1850 ml


 


Balance -330 ml











Laboratory Data


24H LABS


Laboratory Tests 2


3/18/21 11:46: Bedside Glucose (Misc Panel) 193H


3/18/21 15:46: Bedside Glucose (Misc Panel) 264H


3/18/21 21:17: Bedside Glucose (Misc Panel) 293H


3/19/21 05:22: 


Nucleated Red Blood Cells % (auto) 0.0, Anion Gap 1L, Glomerular Filtration Rate

> 60.0, Calcium Level 7.9L


CBC/BMP


Laboratory Tests


3/19/21 05:22

















ABDULLAHI SAINI MD            Mar 19, 2021 09:09

## 2021-03-19 NOTE — IPN
PROGRESS NOTE



DATE:  03/19/2021



Mr. Christine is again sitting comfortably. He is on -28 of suction, and he has a

one-bubble air leak with forceful coughing, which is intermittent. 



His vital signs show a maximum temperature of 98.0 with a heart rate that

ranges between 61-74 in a sinus rhythm, respiratory rate of 19 without the use

of accessory muscles, who is 97%-99% saturated on 2 liters nasal cannula. His

blood pressure is ranging between 101/67 to 96/59.



His intake and output over the last 24 hours has been recorded as 1520 in and

1880 out, for a negativity of 360 mL. He has put 55 mL out of the chest tube,

and there is the one-bubble air leak with forceful coughing, which is

intermittent. Weight today is 67.1 kg compared to 69 kg yesterday.



PHYSICAL EXAMINATION: He has equal breath sounds on either side. There is the

fine crackles at the end of inspiration. Percussion note is full to the

diaphragm. Cardiac exam is without murmurs, clicks, gallops, or rubs.  I cannot

feel his point of maximal impulse (PMI). S1 and S2 are normal. Abdomen is soft

and nontender. Bowel sounds are positive. There is no hepatomegaly. No

costovertebral angle (CVA) tenderness. Extremities show no pretibial edema, no

calf tenderness, no differential swelling of the upper extremities. Skin is

warm, dry, and perfused without cyanosis or mottling, including that of the

nailbeds and knees. Neck is supple. There is no jugular venous distention. No

subcutaneous emphysema. Trachea is midline. Mouth shows the mucous membranes to

be pink and moist. Lips and commissures without lesions. No thrush. Eyes show

his pupils to be equal and reactive. Extraocular motion intact. Sclerae

anicteric. Neurologic shows II-XII intact. Normal gross motor, gross sensation

intact. Gait is not tested. Psychiatric shows him to be awake, alert, and

oriented times three with appropriate mood and affect and conversational.



His white count is 9.0 with a hemoglobin and hematocrit of 13.4 and 44.6 and a

platelet count of 118 and stable. Chemistries show normal electrolytes,

however, with an elevated total CO2 of 40. BUN and creatinine are 17 and 0.31

with a glucose of 56 and a calcium of 7.9. 



His chest x-ray today shows again slight separation from the chest wall at the

lateral apex. This is slightly worse than yesterday when he was on 40 cm of

suction. He is now on 20 cm of suction. Subcutaneous emphysema looks to be

again starting to dissipate. Costophrenic angles are sharp. There are no

infiltrates other than the reticular fibrotic changes in both lungs with the

right greater than the left.



IMPRESSION: 

1. Spontaneous pneumothorax, right side, recurrent.

2. Advanced interstitial lung disease, clinically idiopathic pulmonary fibrosis.

3. Underlying chronic obstructive pulmonary disease (COPD).

4. Hypoxic polycythemia.

5. Gastroesophageal reflux disease.

6. Diabetes. 

7. Alveolar pleural fistula, intermittent but continuing.



PLAN AND DISCUSSION: I will again try a blood patch today. I am running out of

relatively noninvasive options. Hopefully the second blood patch will work. I

will place him in deep Trendelenburg for 15 minutes in order to coat the apex

of the lung with the instilled blood. Because his air leak is intermittent, it

is going to be impossible to even consider isolating the bronchopulmonary

segment and occluding it.

## 2021-03-20 VITALS — SYSTOLIC BLOOD PRESSURE: 102 MMHG | DIASTOLIC BLOOD PRESSURE: 61 MMHG

## 2021-03-20 VITALS — DIASTOLIC BLOOD PRESSURE: 61 MMHG | SYSTOLIC BLOOD PRESSURE: 102 MMHG

## 2021-03-20 VITALS — SYSTOLIC BLOOD PRESSURE: 102 MMHG | DIASTOLIC BLOOD PRESSURE: 56 MMHG

## 2021-03-20 VITALS — DIASTOLIC BLOOD PRESSURE: 58 MMHG | SYSTOLIC BLOOD PRESSURE: 99 MMHG

## 2021-03-20 VITALS — DIASTOLIC BLOOD PRESSURE: 64 MMHG | SYSTOLIC BLOOD PRESSURE: 107 MMHG

## 2021-03-20 VITALS — DIASTOLIC BLOOD PRESSURE: 59 MMHG | SYSTOLIC BLOOD PRESSURE: 95 MMHG

## 2021-03-20 LAB
BUN SERPL-MCNC: 20 MG/DL (ref 7–18)
CALCIUM SERPL-MCNC: 8.2 MG/DL (ref 8.8–10.2)
CHLORIDE SERPL-SCNC: 102 MEQ/L (ref 98–107)
CO2 SERPL-SCNC: 35 MEQ/L (ref 21–32)
CREAT SERPL-MCNC: 0.49 MG/DL (ref 0.7–1.3)
GFR SERPL CREATININE-BSD FRML MDRD: > 60 ML/MIN/{1.73_M2} (ref 49–?)
GLUCOSE SERPL-MCNC: 178 MG/DL (ref 70–100)
HCT VFR BLD AUTO: 45.2 % (ref 42–52)
HGB BLD-MCNC: 13.7 G/DL (ref 13.5–17.5)
MCH RBC QN AUTO: 29.2 PG (ref 27–33)
MCHC RBC AUTO-ENTMCNC: 30.3 G/DL (ref 32–36.5)
MCV RBC AUTO: 96.4 FL (ref 80–96)
PLATELET # BLD AUTO: 133 10^3/UL (ref 150–450)
POTASSIUM SERPL-SCNC: 4.2 MEQ/L (ref 3.5–5.1)
RBC # BLD AUTO: 4.69 10^6/UL (ref 4.3–6.1)
SODIUM SERPL-SCNC: 139 MEQ/L (ref 136–145)
WBC # BLD AUTO: 9.3 10^3/UL (ref 4–10)

## 2021-03-20 RX ADMIN — LEVALBUTEROL HYDROCHLORIDE SCH MG: 1.25 SOLUTION, CONCENTRATE RESPIRATORY (INHALATION) at 20:51

## 2021-03-20 RX ADMIN — SODIUM CHLORIDE SCH UNITS: 4.5 INJECTION, SOLUTION INTRAVENOUS at 20:25

## 2021-03-20 RX ADMIN — PANTOPRAZOLE SODIUM SCH MG: 40 TABLET, DELAYED RELEASE ORAL at 08:33

## 2021-03-20 RX ADMIN — LEVALBUTEROL HYDROCHLORIDE SCH MG: 1.25 SOLUTION, CONCENTRATE RESPIRATORY (INHALATION) at 02:15

## 2021-03-20 RX ADMIN — SODIUM CHLORIDE SCH UNITS: 4.5 INJECTION, SOLUTION INTRAVENOUS at 08:32

## 2021-03-20 RX ADMIN — SODIUM CHLORIDE, PRESERVATIVE FREE SCH ML: 5 INJECTION INTRAVENOUS at 22:02

## 2021-03-20 RX ADMIN — MAGNESIUM HYDROXIDE SCH ML: 400 SUSPENSION ORAL at 08:11

## 2021-03-20 RX ADMIN — MIDODRINE HYDROCHLORIDE SCH MG: 5 TABLET ORAL at 09:13

## 2021-03-20 RX ADMIN — Medication PRN UNITS: at 08:34

## 2021-03-20 RX ADMIN — INSULIN LISPRO SCH UNITS: 100 INJECTION, SOLUTION INTRAVENOUS; SUBCUTANEOUS at 20:26

## 2021-03-20 RX ADMIN — SODIUM CHLORIDE, PRESERVATIVE FREE SCH ML: 5 INJECTION INTRAVENOUS at 06:44

## 2021-03-20 RX ADMIN — INSULIN DETEMIR SCH UNITS: 100 INJECTION, SOLUTION SUBCUTANEOUS at 20:25

## 2021-03-20 RX ADMIN — ATOVAQUONE SCH MG: 750 SUSPENSION ORAL at 08:30

## 2021-03-20 RX ADMIN — LEVALBUTEROL HYDROCHLORIDE SCH MG: 1.25 SOLUTION, CONCENTRATE RESPIRATORY (INHALATION) at 14:01

## 2021-03-20 RX ADMIN — INSULIN LISPRO SCH UNITS: 100 INJECTION, SOLUTION INTRAVENOUS; SUBCUTANEOUS at 12:48

## 2021-03-20 RX ADMIN — HYDROXYCHLOROQUINE SULFATE SCH MG: 200 TABLET, FILM COATED ENTERAL at 20:27

## 2021-03-20 RX ADMIN — DOCUSATE SODIUM SCH MG: 100 CAPSULE, LIQUID FILLED ORAL at 20:24

## 2021-03-20 RX ADMIN — DOCUSATE SODIUM SCH MG: 100 CAPSULE, LIQUID FILLED ORAL at 08:33

## 2021-03-20 RX ADMIN — MIDODRINE HYDROCHLORIDE SCH MG: 5 TABLET ORAL at 12:47

## 2021-03-20 RX ADMIN — HYDROXYCHLOROQUINE SULFATE SCH MG: 200 TABLET, FILM COATED ENTERAL at 08:33

## 2021-03-20 RX ADMIN — SODIUM CHLORIDE, PRESERVATIVE FREE SCH ML: 5 INJECTION INTRAVENOUS at 14:00

## 2021-03-20 RX ADMIN — SODIUM CHLORIDE PRN ML: 9 INJECTION, SOLUTION INTRAMUSCULAR; INTRAVENOUS; SUBCUTANEOUS at 08:35

## 2021-03-20 RX ADMIN — LEVALBUTEROL HYDROCHLORIDE SCH MG: 1.25 SOLUTION, CONCENTRATE RESPIRATORY (INHALATION) at 07:06

## 2021-03-20 RX ADMIN — INSULIN LISPRO SCH UNITS: 100 INJECTION, SOLUTION INTRAVENOUS; SUBCUTANEOUS at 18:00

## 2021-03-20 RX ADMIN — INSULIN LISPRO SCH UNITS: 100 INJECTION, SOLUTION INTRAVENOUS; SUBCUTANEOUS at 08:32

## 2021-03-20 RX ADMIN — INSULIN DETEMIR SCH UNITS: 100 INJECTION, SOLUTION SUBCUTANEOUS at 08:34

## 2021-03-20 RX ADMIN — MIDODRINE HYDROCHLORIDE SCH MG: 5 TABLET ORAL at 16:38

## 2021-03-20 NOTE — REP
INDICATION:

pneumothorax.



COMPARISON:

03/19/2021



TECHNIQUE:

Upright PA and lateral chest.



FINDINGS:

The right thoracotomy tube is unchanged.

The right pneumothorax has increased in size and now measures 2.3 cm.

The subcutaneous emphysema along the right lateral chest wall is unchanged.

There is a large bulla superimposed over the right  heart margin, unchanged.

The right subclavian central venous catheter is unchanged with the tip in the right

atrium.

Right lung is clear.  Cardiac size normal.





IMPRESSION:

The right apical pneumothorax is increased as described.





<Electronically signed by Richard Junior > 03/20/21 1892

## 2021-03-20 NOTE — IPN
PROGRESS NOTE



DATE:  03/20/2021



SUBJECTIVE:  Patient is seen and examined at the bedside.  Chart has been

reviewed.  Patient denies any lightheadedness, dizziness, chest pain, pressure,

tightness, shortness of breath, near syncopal episode despite low blood

pressure of 95 systolic.  Patient was given intravenous (IV) fluid bolus of 500

mL yesterday as well as started on midodrine 500 mg at 8:00, 12:00 and 4:00

p.m.  He continues to have chest tube managed by thoracic surgeon, has had a

negative balance.  Glucose level is slightly better controlled, 222-286, with

fasting glucose this morning which is still pending.  



Repeat chest x-ray 03/20/2021 shows right apical pneumothorax has increased. 

Right lung is otherwise clear.  



Patient had another intrathoracic blood pouch yesterday.



OBJECTIVE: 

GENERAL:  No respiratory distress.  Speaks in full sentences.  No jugular

venous distention (JVD).  No thyromegaly.  No cervical lymphadenopathy.  No

subcutaneous edema.  

LUNGS:  Diminished right-sided chest tube noted.  No subcutaneous emphysema.

HEART:  S1, S2.  Sinus.

ABDOMEN:  Soft, nondistended, nontender.

EXTREMITIES:  No pitting edema.



LABORATORY DATA:

White count 9, hemoglobin 13, hematocrit 44, platelet count 118.  Previous

platelet count 106.



Sodium 142, potassium 4, chloride 102, bicarbonate 40, BUN 70, creatinine 0.31,

glucose 56.



Today's labs are still pending.



ASSESSMENT AND PLAN:  This is a 65-year-old male with the following issues.



1.  Spontaneous pneumothorax on the right with chest tube 03/05/2021 to

03/08/2021.  Reinserted on 03/10/2021 due to recurrent pneumothorax status post

blood pouch 03/17/2021 and 03/19/2021.  Still with increasing right apical

pneumothorax on repeat x-ray on 03/20/2021.  Managed by thoracic surgeon, Dr. Cruz Hay.  Continue with progressive care unit (PCU), telemetry

monitoring.  Further management per thoracic surgery.



2.  Diabetes.  On Levemir insulin twice a day.  Awaiting blood work this

morning.  Consistent carbohydrate diet with fingersticks in the morning and at

bedtime with coverage.



3.  Systemic lupus erythematosis (SLE)/idiopathic pulmonary fibrosis (IPF). 

Managed by pulmonologist and rheumatologist.  Cytoxan postponed due to current

pneumothorax.  On chronic Plaquenil and prednisone. 



4.  Reflux.  On proton pump inhibitor (PPI).



5.  Chronic obstructive pulmonary disease (COPD).  Follows with Dr. Benitez as

outpatient. 



6.  Alveolar pleural fistula.  Currently with chest tube.  Improved.  Now with

a recurrent pneumothorax.  Still with chest tube managed by thoracic surgery.



7.  Secondary polycythemia due to chronic hypoxia due to IPF, SLE, COPD.



8.  Thrombocytopenia.  Heparin-induced thrombocytopenia (HIT) negative. 

Platelet count is slowly increasing.



DISPOSITION:  Still requiring chest tube placement.  Await clinical improvement

over the next 2-3 days.

MTDD

## 2021-03-20 NOTE — IPNPDOC
Date Seen


The patient was seen on 3/20/21.





Progress Note


Hospitalist progress note dictated job #[id number]. If urgently needed, pls call 

hypertype [phone] for STAT transcription.





VS, I&O, 24H, Fishbone


Vital Signs/I&O





Vital Signs








  Date Time  Temp Pulse Resp B/P (MAP) Pulse Ox O2 Delivery O2 Flow Rate FiO2


 


3/20/21 08:00 97.4 86 18 102/56 (71) 99 Nasal Cannula 2.0 


 


3/17/21 08:00        98














I&O- Last 24 Hours up to 6 AM 


 


 3/20/21





 06:00


 


Intake Total 1458 ml


 


Output Total 1730 ml


 


Balance -272 ml











Laboratory Data


24H LABS


Laboratory Tests 2


3/19/21 12:42: Bedside Glucose (Misc Panel) 222H


3/19/21 17:19: Bedside Glucose (Misc Panel) 305H


3/19/21 21:45: Bedside Glucose (Misc Panel) 286H











ABDULLAHI SAINI MD            Mar 20, 2021 08:42

## 2021-03-20 NOTE — IPN
PROGRESS NOTE



DATE:  03/20/2021



Mr. Christine underwent yet another blood patch yesterday. The chest tube has been

on suction, and the lung is now again  from the chest wall. He is not

symptomatically any worse.



His vital signs show a maximum temperature of 97.6 with a heart rate that

ranges between 62-86 in a sinus rhythm, respiratory rate of 17-19 without the

use of accessory muscles, who is 97%-99% saturated on 2 liters nasal cannula,

and whose blood pressure is ranging between 102/61 to 95/59. 



His intake and output for the past 24 hours has been recorded as 1458 in and

1475 out, for near equality. He has put 30 mL out of the chest tube, and there

is a one-bubble air leak, which is inconstant, on coughing 



PHYSICAL EXAMINATION: His lungs show equal breath sounds on either side with

Velcro crackles at the bases during late inspiration. Percussion notes are full

to the diaphragm. There is no subcutaneous emphysema. Cardiac exam is without

murmurs, clicks, gallops, or rubs.  I cannot feel his point of maximal impulse

(PMI). S1 and S2 are normal. Abdomen is soft and nontender. Bowel sounds are

positive. There is no hepatomegaly. No costovertebral angle (CVA) tenderness.

Extremities show no pretibial edema, no calf tenderness, no differential

swelling of the upper extremities. Skin is warm, dry, and perfused without

cyanosis or mottling, including that of the nailbeds and knees. Neck is supple.

There is no jugular venous distention. No subcutaneous emphysema. Trachea is

midline. Mouth shows the mucous membranes to be pink and moist. Lips and

commissures without lesions. No thrush. Eyes show his pupils to be equal and

reactive. Extraocular motion intact. Sclerae anicteric. Neurologic shows II-XII

intact. Normal gross motor, gross sensation intact. Gait is not tested.

Psychiatric shows him to be awake, alert, and oriented times three with

appropriate mood and affect and conversational.



His white count today is 9.3 with a hemoglobin and hematocrit of 13.7 and 45.2,

respectively, with a platelet count of 133. There is no differential.



His electrolytes are normal except for an elevated total CO2 of 35, which is an

improvement over the last few days, where it was above 40. BUN and creatinine

are 20 and 0.49 with a glucose of 178 and a calcium 8.2. 



His chest x-ray shows the lung again away from the chest wall with a

pneumothorax. There is subcutaneous emphysema on the lateral chest wall, which

looks to be increased from yesterday's x-ray. 



IMPRESSION: 

1. Spontaneous pneumothorax, right side, recurrent.

2. Alveolar pleural fistula, intermittent but continuing.

3. Advanced interstitial lung disease, clinically idiopathic pulmonary fibrosis.

4. Underlying chronic obstructive pulmonary disease (COPD).

5. Hypoxic polycythemia.

6. Gastroesophageal reflux disease.

7. Diabetes. 



PLAN AND DISCUSSION: I am being forced to go to the operating room, although I

have tried to avoid this.  I will therefore taken him to the operating room

early next week and take a talc pleurodesis. I am concerned that he is not

going to tolerate one-lung anesthesia very well. Nonetheless, in some way or

another I will do a pleurodesis on him. I highly doubt I am going to be able to

find the air leak, although I certainly will try and look with the

thoracoscope. He understands the risks, including mortality, bleeding,

infection, and postoperative pneumonia. He understands and is willing to

proceed.

## 2021-03-21 VITALS — DIASTOLIC BLOOD PRESSURE: 61 MMHG | SYSTOLIC BLOOD PRESSURE: 109 MMHG

## 2021-03-21 VITALS — DIASTOLIC BLOOD PRESSURE: 62 MMHG | SYSTOLIC BLOOD PRESSURE: 101 MMHG

## 2021-03-21 VITALS — SYSTOLIC BLOOD PRESSURE: 106 MMHG | DIASTOLIC BLOOD PRESSURE: 61 MMHG

## 2021-03-21 VITALS — DIASTOLIC BLOOD PRESSURE: 65 MMHG | SYSTOLIC BLOOD PRESSURE: 107 MMHG

## 2021-03-21 VITALS — SYSTOLIC BLOOD PRESSURE: 111 MMHG | DIASTOLIC BLOOD PRESSURE: 64 MMHG

## 2021-03-21 VITALS — DIASTOLIC BLOOD PRESSURE: 65 MMHG | SYSTOLIC BLOOD PRESSURE: 99 MMHG

## 2021-03-21 LAB
BUN SERPL-MCNC: 23 MG/DL (ref 7–18)
CALCIUM SERPL-MCNC: 7.5 MG/DL (ref 8.8–10.2)
CHLORIDE SERPL-SCNC: 106 MEQ/L (ref 98–107)
CO2 SERPL-SCNC: 38 MEQ/L (ref 21–32)
CREAT SERPL-MCNC: 0.53 MG/DL (ref 0.7–1.3)
GFR SERPL CREATININE-BSD FRML MDRD: > 60 ML/MIN/{1.73_M2} (ref 49–?)
GLUCOSE SERPL-MCNC: 151 MG/DL (ref 70–100)
HCT VFR BLD AUTO: 40.9 % (ref 42–52)
HGB BLD-MCNC: 12.5 G/DL (ref 13.5–17.5)
MCH RBC QN AUTO: 29.9 PG (ref 27–33)
MCHC RBC AUTO-ENTMCNC: 30.6 G/DL (ref 32–36.5)
MCV RBC AUTO: 97.8 FL (ref 80–96)
PLATELET # BLD AUTO: 125 10^3/UL (ref 150–450)
POTASSIUM SERPL-SCNC: 3.9 MEQ/L (ref 3.5–5.1)
RBC # BLD AUTO: 4.18 10^6/UL (ref 4.3–6.1)
SODIUM SERPL-SCNC: 145 MEQ/L (ref 136–145)
WBC # BLD AUTO: 9.3 10^3/UL (ref 4–10)

## 2021-03-21 RX ADMIN — INSULIN LISPRO SCH UNITS: 100 INJECTION, SOLUTION INTRAVENOUS; SUBCUTANEOUS at 18:00

## 2021-03-21 RX ADMIN — LEVALBUTEROL HYDROCHLORIDE SCH MG: 1.25 SOLUTION, CONCENTRATE RESPIRATORY (INHALATION) at 07:06

## 2021-03-21 RX ADMIN — SODIUM CHLORIDE, PRESERVATIVE FREE SCH ML: 5 INJECTION INTRAVENOUS at 14:01

## 2021-03-21 RX ADMIN — DOCUSATE SODIUM SCH MG: 100 CAPSULE, LIQUID FILLED ORAL at 08:55

## 2021-03-21 RX ADMIN — INSULIN LISPRO SCH UNITS: 100 INJECTION, SOLUTION INTRAVENOUS; SUBCUTANEOUS at 12:56

## 2021-03-21 RX ADMIN — HYDROXYCHLOROQUINE SULFATE SCH MG: 200 TABLET, FILM COATED ENTERAL at 08:57

## 2021-03-21 RX ADMIN — SODIUM CHLORIDE, PRESERVATIVE FREE SCH ML: 5 INJECTION INTRAVENOUS at 21:58

## 2021-03-21 RX ADMIN — INSULIN LISPRO SCH UNITS: 100 INJECTION, SOLUTION INTRAVENOUS; SUBCUTANEOUS at 08:57

## 2021-03-21 RX ADMIN — MIDODRINE HYDROCHLORIDE SCH MG: 5 TABLET ORAL at 08:57

## 2021-03-21 RX ADMIN — INSULIN DETEMIR SCH UNITS: 100 INJECTION, SOLUTION SUBCUTANEOUS at 08:55

## 2021-03-21 RX ADMIN — MIDODRINE HYDROCHLORIDE SCH MG: 5 TABLET ORAL at 12:56

## 2021-03-21 RX ADMIN — SODIUM CHLORIDE SCH UNITS: 4.5 INJECTION, SOLUTION INTRAVENOUS at 21:57

## 2021-03-21 RX ADMIN — ATOVAQUONE SCH MG: 750 SUSPENSION ORAL at 08:57

## 2021-03-21 RX ADMIN — SODIUM CHLORIDE SCH UNITS: 4.5 INJECTION, SOLUTION INTRAVENOUS at 08:58

## 2021-03-21 RX ADMIN — HYDROXYCHLOROQUINE SULFATE SCH MG: 200 TABLET, FILM COATED ENTERAL at 21:56

## 2021-03-21 RX ADMIN — PANTOPRAZOLE SODIUM SCH MG: 40 TABLET, DELAYED RELEASE ORAL at 08:58

## 2021-03-21 RX ADMIN — INSULIN DETEMIR SCH UNITS: 100 INJECTION, SOLUTION SUBCUTANEOUS at 21:57

## 2021-03-21 RX ADMIN — MAGNESIUM HYDROXIDE SCH ML: 400 SUSPENSION ORAL at 08:55

## 2021-03-21 RX ADMIN — LEVALBUTEROL HYDROCHLORIDE SCH MG: 1.25 SOLUTION, CONCENTRATE RESPIRATORY (INHALATION) at 01:17

## 2021-03-21 RX ADMIN — LEVALBUTEROL HYDROCHLORIDE SCH MG: 1.25 SOLUTION, CONCENTRATE RESPIRATORY (INHALATION) at 20:58

## 2021-03-21 RX ADMIN — SODIUM CHLORIDE, PRESERVATIVE FREE SCH ML: 5 INJECTION INTRAVENOUS at 06:02

## 2021-03-21 RX ADMIN — INSULIN LISPRO SCH UNITS: 100 INJECTION, SOLUTION INTRAVENOUS; SUBCUTANEOUS at 21:57

## 2021-03-21 RX ADMIN — MIDODRINE HYDROCHLORIDE SCH MG: 5 TABLET ORAL at 17:01

## 2021-03-21 RX ADMIN — DOCUSATE SODIUM SCH MG: 100 CAPSULE, LIQUID FILLED ORAL at 21:00

## 2021-03-21 RX ADMIN — LEVALBUTEROL HYDROCHLORIDE SCH MG: 1.25 SOLUTION, CONCENTRATE RESPIRATORY (INHALATION) at 13:20

## 2021-03-21 NOTE — IPN
PROGRESS NOTE



DATE:  03/21/2021



Mr. Christine is sitting on the bed comfortably without shortness of breath. I have

once again gone over the plan and operation with him for tomorrow, where we

will be doing a talc pleurodesis and maybe a wedge resection if I can find from

where his air leak is emanating. 



His vital signs today show a maximum temperature of 98.6 with a heart rate that

ranges between 72-78 in a sinus rhythm, respiratory rate of 17-18 without the

use of accessory muscles, who is 98%-99% saturated on 2 liters nasal cannula

and whose blood pressure is ranging between 109/61 to 99/65. 



His intake and output for the past 24 hours has been recorded as 1396 in and

2090, for a negativity of 694 mL. He has put out 90 mL from the chest tube. I

do not see an air leak, although the nursing staff saw one earlier today. He

weighs 71 kg today compared to 67.6 kg yesterday.



PHYSICAL EXAMINATION: His lungs show equal breath sounds on either side. He has

fine crackles at each base during late inspiration. He has bronchophony on both

sides, which I have heard before but not noted, no doubt secondary to his

bronchiectasis and fibrosis. Percussion notes are full to the diaphragm.

Cardiac exam is without murmurs, clicks, gallops, or rubs.  I cannot feel his

point of maximal impulse (PMI). S1 and S2 are normal. Abdomen is soft and

nontender. Bowel sounds are positive. There is no hepatomegaly. No

costovertebral angle (CVA) tenderness. Extremities show no pretibial edema, no

calf tenderness, no differential swelling of the upper extremities. Skin is

warm, dry, and perfused without cyanosis or mottling, including that of the

nailbeds and knees. Neck is supple. There is no jugular venous distention. No

subcutaneous emphysema. Trachea is midline. Mouth shows the mucous membranes to

be pink and moist. Lips and commissures without lesions. No thrush. Eyes show

his pupils to be equal and reactive. Extraocular motion intact. Sclerae

anicteric. Neurologic shows II-XII intact. Normal gross motor, gross sensation

intact. Gait is not tested. Psychiatric shows him to be awake, alert, and

oriented times three with appropriate mood and affect and conversational.



His white count is 9.3 with a hemoglobin and hematocrit of 12.4 and 40.9,

respectively. Platelet count is 125. 



Electrolytes are normal except for an elevated total CO2 of 38. BUN and

creatinine are 23 and 0.53 with a glucose of 151 and a calcium 7.5. 



His chest x-ray shows the lung fully expanded to the chest wall on 40 cm of

suction. Chest tube is in good place at the apex. Subcutaneous emphysema is

again dissipating. Costophrenic angles are sharp, and he has the reticular

fibrotic pattern consistent with his pulmonary fibrosis.



IMPRESSION: 

1. Spontaneous pneumothorax, right side, recurrent.

2. Alveolar pleural fistula, intermittent but continuing.

3. Advanced interstitial lung disease, clinically idiopathic pulmonary fibrosis.

4. Chronic obstructive pulmonary disease (COPD).

5. Hypoxic polycythemia

6. Gastroesophageal reflux disease.

7. Diabetes. 



I have again gone over that I will attempt doing a talc pleurodesis with

fluoroscopic control. My backup strategy is if he does not tolerate one-lung

anesthesia at all, we will do a slurry, again hopefully under fluoroscopic

control with insufflation. I have my doubts I am going to be able to find this

intermittent leak, and what I am hoping is that once the lung is adherent to

the chest wall that the air leak will resolve, as it looks to be so small, and

the lung will stay up with the adhesions counteracting the high compliance of

the lungs to withdraw from the chest wall. I under no illusion that this is

guaranteed to work, as it usually does.

## 2021-03-21 NOTE — IPN
PROGRESS NOTE



DATE:  03/21/2021



SUBJECTIVE:  The patient continues to have the chest tube, managed by thoracic

surgery. No other issues overnight. Pain is controlled, afebrile. No fevers,

chills, shortness of breath, chest pain, pressure, tightness, lightheadedness

or palpitations.



OBJECTIVE: 

PHYSICAL EXAM:  

Vitals: Temperature 98.6, pulse 77, respiratory rate 17, blood pressure 106/61,

99% on two liters nasal cannula.

General: Awake, alert and oriented x3, answering questions appropriately. No

use of respiratory accessory muscles, conversational dyspnea, pallor, icterus

or jaundice.

Neck: No JVD or thyromegaly.

HEENT: Moist mucous membranes. 

Lungs: Chest tube noted in the right, clear to auscultation on the left with

fine crackles on the right.

Heart: S1, S2, sinus rhythm, no murmurs, rubs or gallops.

Abdomen: Soft, nontender, nondistended, positive bowel sounds. 

Extremities: No cyanosis, clubbing or pitting edema.



LABORATORY DATA: White count 9.3, hemoglobin 12, hematocrit 40, platelet count

125. Previous platelet count was 133. Sodium 145, potassium 3.9, chloride 106,

bicarb 38, BUN 23, creatinine 0.5, glucose of 151. Chest x-ray 3/21/2021: Right

apical pneumothorax has resolved, no other interval change.



ASSESSMENT AND PLAN: This 65-year-old male with history of systemic

erythematosus, idiopathic pulmonary fibrosis, managed by his rheumatologist and

pulmonologist as outpatient with Cytoxan planned for the future on chronic

Plaquenil and prednisone, presented to the emergency room with shortness of

breath, found to have a spontaneous pneumothorax on the right, status post

chest tube placement 3/5 to 3/8, reinserted 3/10 due to recurrent pneumothorax

status post blood patch on 3/17 and 3/18.



CURRENT ISSUES:

1.  Spontaneous pneumothorax with right-sided chest, recurrent pneumothorax and

    blood patch x2.

2.  Systemic lupus erythematosis/idiopathic pulmonary fibrosis.

3.  Diabetes, type 2.

4.  Gastroesophageal reflux disease.

5.  COPD. 

6.  Alveolar pleural fistula.

7.  Secondary polycythemia due to chronic hypoxia from IPF, SLE and COPD. 

8.  Thrombocytopenia with negative heparin-induced thrombocytopenia panel.



PLAN: The patient is managed by thoracic surgeon, Dr. Cruz Hay for chest

tube. Once the chest tube has been removed, we will need to monitor for 12 to

24 hours to make sure the spontaneous pneumothorax does not return. He is

otherwise medically stable. 

LENORA

## 2021-03-21 NOTE — IPNPDOC
Date Seen


The patient was seen on 3/21/21.





Progress Note


Hospitalist progress note dictated job #[id number]. Pls have unit clerk call 

[employer] at [phone] for stat transcription.





VS, I&O, 24H, Fishbone


Vital Signs/I&O





Vital Signs








  Date Time  Temp Pulse Resp B/P (MAP) Pulse Ox O2 Delivery O2 Flow Rate FiO2


 


3/21/21 04:00 98.6 77 17 106/61 (76) 99 Nasal Cannula 2.0 


 


3/17/21 08:00        98














I&O- Last 24 Hours up to 6 AM 


 


 3/21/21





 05:59


 


Intake Total 1756 ml


 


Output Total 2240 ml


 


Balance -484 ml











Laboratory Data


24H LABS


Laboratory Tests 2


3/20/21 08:54: 


Nucleated Red Blood Cells % (auto) 0.0, Anion Gap 2L, Glomerular Filtration Rate

> 60.0, Calcium Level 8.2L


3/20/21 12:17: Bedside Glucose (Misc Panel) 262H


3/20/21 16:40: Bedside Glucose (Misc Panel) 342H


3/20/21 17:47: Bedside Glucose (Misc Panel) 382H


3/20/21 19:46: Bedside Glucose (Misc Panel) 304H


3/21/21 04:31: 


Nucleated Red Blood Cells % (auto) 0.0, Anion Gap 1L, Glomerular Filtration Rate

> 60.0, Calcium Level 7.5L


CBC/BMP


Laboratory Tests


3/20/21 08:54








3/21/21 04:31

















ABDULLAHI SAINI MD            Mar 21, 2021 08:42

## 2021-03-21 NOTE — REP
INDICATION:

pneumothorax.



COMPARISON:

03/20/2021



TECHNIQUE:

Upright PA and lateral chest.



FINDINGS:

The right thoracotomy tube is unchanged.

The right apical pneumothorax has resolved.

The subcutaneous emphysema along the right lateral chest wall is unchanged.

The right subclavian central venous catheter is unchanged.  The tip is again in the

right atrium as previously.

The large bulla superimposed over the right cardiac margin is unchanged.

There is diffuse interstitial coarsening throughout the right lung, unchanged.

The left lung is clear.





IMPRESSION:

The right apical pneumothorax has resolved.

There is no other interval change.





<Electronically signed by Richard Junior > 03/21/21 0802

## 2021-03-22 VITALS — SYSTOLIC BLOOD PRESSURE: 117 MMHG | DIASTOLIC BLOOD PRESSURE: 76 MMHG

## 2021-03-22 VITALS — SYSTOLIC BLOOD PRESSURE: 116 MMHG | DIASTOLIC BLOOD PRESSURE: 67 MMHG

## 2021-03-22 VITALS — DIASTOLIC BLOOD PRESSURE: 52 MMHG | SYSTOLIC BLOOD PRESSURE: 104 MMHG

## 2021-03-22 VITALS — DIASTOLIC BLOOD PRESSURE: 70 MMHG | SYSTOLIC BLOOD PRESSURE: 108 MMHG

## 2021-03-22 VITALS — DIASTOLIC BLOOD PRESSURE: 62 MMHG | SYSTOLIC BLOOD PRESSURE: 124 MMHG

## 2021-03-22 VITALS — DIASTOLIC BLOOD PRESSURE: 58 MMHG | SYSTOLIC BLOOD PRESSURE: 102 MMHG

## 2021-03-22 VITALS — DIASTOLIC BLOOD PRESSURE: 43 MMHG | SYSTOLIC BLOOD PRESSURE: 88 MMHG

## 2021-03-22 VITALS — SYSTOLIC BLOOD PRESSURE: 126 MMHG | DIASTOLIC BLOOD PRESSURE: 79 MMHG

## 2021-03-22 VITALS — DIASTOLIC BLOOD PRESSURE: 49 MMHG | SYSTOLIC BLOOD PRESSURE: 74 MMHG

## 2021-03-22 VITALS — SYSTOLIC BLOOD PRESSURE: 93 MMHG | DIASTOLIC BLOOD PRESSURE: 53 MMHG

## 2021-03-22 VITALS — SYSTOLIC BLOOD PRESSURE: 107 MMHG | DIASTOLIC BLOOD PRESSURE: 49 MMHG

## 2021-03-22 LAB
BASE EXCESS BLDA CALC-SCNC: 3 MMOL/L (ref -2–2)
BASOPHILS # BLD AUTO: 0.1 10^3/UL (ref 0–0.2)
BASOPHILS NFR BLD AUTO: 0.9 % (ref 0–1)
BUN SERPL-MCNC: 18 MG/DL (ref 7–18)
BUN SERPL-MCNC: 22 MG/DL (ref 7–18)
CALCIUM SERPL-MCNC: 7.9 MG/DL (ref 8.8–10.2)
CALCIUM SERPL-MCNC: 8.3 MG/DL (ref 8.8–10.2)
CHLORIDE SERPL-SCNC: 102 MEQ/L (ref 98–107)
CHLORIDE SERPL-SCNC: 98 MEQ/L (ref 98–107)
CO2 BLDA CALC-SCNC: 33.4 MEQ/L (ref 23–31)
CO2 SERPL-SCNC: 34 MEQ/L (ref 21–32)
CO2 SERPL-SCNC: 35 MEQ/L (ref 21–32)
CREAT SERPL-MCNC: 0.41 MG/DL (ref 0.7–1.3)
CREAT SERPL-MCNC: 0.55 MG/DL (ref 0.7–1.3)
EOSINOPHIL # BLD AUTO: 0.2 10^3/UL (ref 0–0.5)
EOSINOPHIL NFR BLD AUTO: 1.3 % (ref 0–3)
GFR SERPL CREATININE-BSD FRML MDRD: > 60 ML/MIN/{1.73_M2} (ref 49–?)
GFR SERPL CREATININE-BSD FRML MDRD: > 60 ML/MIN/{1.73_M2} (ref 49–?)
GLUCOSE SERPL-MCNC: 225 MG/DL (ref 70–100)
GLUCOSE SERPL-MCNC: 96 MG/DL (ref 70–100)
HCO3 BLDA-SCNC: 31.5 MEQ/L (ref 22–26)
HCO3 STD BLDA-SCNC: 27.2 MEQ/L (ref 22–26)
HCT VFR BLD AUTO: 42 % (ref 42–52)
HCT VFR BLD AUTO: 49.4 % (ref 42–52)
HGB BLD-MCNC: 12.7 G/DL (ref 13.5–17.5)
HGB BLD-MCNC: 15.2 G/DL (ref 13.5–17.5)
LYMPHOCYTES # BLD AUTO: 0.4 10^3/UL (ref 1.5–5)
LYMPHOCYTES NFR BLD AUTO: 3.1 % (ref 24–44)
MCH RBC QN AUTO: 29.1 PG (ref 27–33)
MCH RBC QN AUTO: 29.5 PG (ref 27–33)
MCHC RBC AUTO-ENTMCNC: 30.2 G/DL (ref 32–36.5)
MCHC RBC AUTO-ENTMCNC: 30.8 G/DL (ref 32–36.5)
MCV RBC AUTO: 95.7 FL (ref 80–96)
MCV RBC AUTO: 96.3 FL (ref 80–96)
MONOCYTES # BLD AUTO: 0.2 10^3/UL (ref 0–0.8)
MONOCYTES NFR BLD AUTO: 1.4 % (ref 2–8)
NEUTROPHILS # BLD AUTO: 10.3 10^3/UL (ref 1.5–8.5)
NEUTROPHILS NFR BLD AUTO: 89 % (ref 36–66)
PCO2 BLDA: 64.2 MMHG (ref 35–45)
PH BLDA: 7.31 UNITS (ref 7.35–7.45)
PLATELET # BLD AUTO: 137 10^3/UL (ref 150–450)
PLATELET # BLD AUTO: 157 10^3/UL (ref 150–450)
PO2 BLDA: 130.1 MMHG (ref 75–100)
POTASSIUM SERPL-SCNC: 4 MEQ/L (ref 3.5–5.1)
POTASSIUM SERPL-SCNC: 4.7 MEQ/L (ref 3.5–5.1)
RBC # BLD AUTO: 4.36 10^6/UL (ref 4.3–6.1)
RBC # BLD AUTO: 5.16 10^6/UL (ref 4.3–6.1)
SAO2 % BLDA: 99.1 % (ref 95–99)
SODIUM SERPL-SCNC: 135 MEQ/L (ref 136–145)
SODIUM SERPL-SCNC: 141 MEQ/L (ref 136–145)
WBC # BLD AUTO: 11.5 10^3/UL (ref 4–10)
WBC # BLD AUTO: 8.6 10^3/UL (ref 4–10)

## 2021-03-22 RX ADMIN — SODIUM CHLORIDE SCH MLS/HR: 0.9 INJECTION, SOLUTION INTRAVENOUS at 21:28

## 2021-03-22 RX ADMIN — INSULIN LISPRO SCH UNITS: 100 INJECTION, SOLUTION INTRAVENOUS; SUBCUTANEOUS at 07:26

## 2021-03-22 RX ADMIN — LEVALBUTEROL HYDROCHLORIDE SCH MG: 1.25 SOLUTION, CONCENTRATE RESPIRATORY (INHALATION) at 07:09

## 2021-03-22 RX ADMIN — INSULIN LISPRO SCH UNITS: 100 INJECTION, SOLUTION INTRAVENOUS; SUBCUTANEOUS at 12:00

## 2021-03-22 RX ADMIN — MIDODRINE HYDROCHLORIDE SCH MG: 5 TABLET ORAL at 17:54

## 2021-03-22 RX ADMIN — SODIUM CHLORIDE, PRESERVATIVE FREE SCH ML: 5 INJECTION INTRAVENOUS at 05:54

## 2021-03-22 RX ADMIN — CEFAZOLIN SODIUM SCH MLS/HR: 1 INJECTION, POWDER, FOR SOLUTION INTRAMUSCULAR; INTRAVENOUS at 20:27

## 2021-03-22 RX ADMIN — KETOROLAC TROMETHAMINE SCH MG: 30 INJECTION, SOLUTION INTRAMUSCULAR at 23:25

## 2021-03-22 RX ADMIN — MAGNESIUM HYDROXIDE SCH ML: 400 SUSPENSION ORAL at 08:19

## 2021-03-22 RX ADMIN — SODIUM CHLORIDE SCH UNITS: 4.5 INJECTION, SOLUTION INTRAVENOUS at 08:18

## 2021-03-22 RX ADMIN — KETOROLAC TROMETHAMINE SCH MG: 30 INJECTION, SOLUTION INTRAMUSCULAR at 17:53

## 2021-03-22 RX ADMIN — Medication SCH MLS/HR: at 16:14

## 2021-03-22 RX ADMIN — PANTOPRAZOLE SODIUM SCH MG: 40 TABLET, DELAYED RELEASE ORAL at 09:18

## 2021-03-22 RX ADMIN — INSULIN DETEMIR SCH UNITS: 100 INJECTION, SOLUTION SUBCUTANEOUS at 08:19

## 2021-03-22 RX ADMIN — SODIUM CHLORIDE SCH UNITS: 4.5 INJECTION, SOLUTION INTRAVENOUS at 09:00

## 2021-03-22 RX ADMIN — LEVALBUTEROL HYDROCHLORIDE SCH MG: 1.25 SOLUTION, CONCENTRATE RESPIRATORY (INHALATION) at 01:31

## 2021-03-22 RX ADMIN — HYDROXYCHLOROQUINE SULFATE SCH MG: 200 TABLET, FILM COATED ENTERAL at 09:19

## 2021-03-22 RX ADMIN — SODIUM CHLORIDE, PRESERVATIVE FREE SCH ML: 5 INJECTION INTRAVENOUS at 17:54

## 2021-03-22 RX ADMIN — HYDROXYCHLOROQUINE SULFATE SCH MG: 200 TABLET, FILM COATED ENTERAL at 20:27

## 2021-03-22 RX ADMIN — LEVALBUTEROL HYDROCHLORIDE SCH MG: 1.25 SOLUTION, CONCENTRATE RESPIRATORY (INHALATION) at 12:58

## 2021-03-22 RX ADMIN — INSULIN LISPRO SCH UNITS: 100 INJECTION, SOLUTION INTRAVENOUS; SUBCUTANEOUS at 20:38

## 2021-03-22 RX ADMIN — SODIUM CHLORIDE SCH UNITS: 4.5 INJECTION, SOLUTION INTRAVENOUS at 20:27

## 2021-03-22 RX ADMIN — SODIUM CHLORIDE, PRESERVATIVE FREE SCH ML: 5 INJECTION INTRAVENOUS at 20:40

## 2021-03-22 RX ADMIN — SODIUM CHLORIDE SCH MLS/HR: 0.9 INJECTION, SOLUTION INTRAVENOUS at 23:24

## 2021-03-22 RX ADMIN — MIDODRINE HYDROCHLORIDE SCH MG: 5 TABLET ORAL at 09:18

## 2021-03-22 RX ADMIN — ATOVAQUONE SCH MG: 750 SUSPENSION ORAL at 09:19

## 2021-03-22 RX ADMIN — INSULIN LISPRO SCH UNITS: 100 INJECTION, SOLUTION INTRAVENOUS; SUBCUTANEOUS at 17:53

## 2021-03-22 RX ADMIN — DOCUSATE SODIUM SCH MG: 100 CAPSULE, LIQUID FILLED ORAL at 20:27

## 2021-03-22 RX ADMIN — INSULIN DETEMIR SCH UNITS: 100 INJECTION, SOLUTION SUBCUTANEOUS at 20:39

## 2021-03-22 RX ADMIN — MIDODRINE HYDROCHLORIDE SCH MG: 5 TABLET ORAL at 12:00

## 2021-03-22 RX ADMIN — LEVALBUTEROL HYDROCHLORIDE SCH MG: 1.25 SOLUTION, CONCENTRATE RESPIRATORY (INHALATION) at 19:34

## 2021-03-22 RX ADMIN — DOCUSATE SODIUM SCH MG: 100 CAPSULE, LIQUID FILLED ORAL at 08:18

## 2021-03-22 RX ADMIN — POTASSIUM CHLORIDE, DEXTROSE MONOHYDRATE AND SODIUM CHLORIDE SCH MLS/HR: 150; 5; 900 INJECTION, SOLUTION INTRAVENOUS at 16:12

## 2021-03-22 NOTE — IPN
PROGRESS NOTE



DATE:  03/22/2021



SUBJECTIVE: The patient was seen and examined at the bedside, chart has been

reviewed. He has no new complaints. He denies chest pain, pressure tightness,

lightheadedness, shortness of breath, fever, chills or cough. Chest tube

remains in place. Plans for talc pleurodesis today, status post two blood

patches with persistent pneumothorax. No air leak noted on the chest tube.



OBJECTIVE: 

VITAL SIGNS: Temperature 98, pulse 80, respiratory rate 18, blood pressure is

116/67, 100% 2 liters nasal cannula.

GENERAL: Patient is awake, alert and oriented x3, able to speak in full

sentences without conversational dyspnea. 

LUNGS: No use of respiratory accessory muscles. The patient has no subcutaneous

emphysema on the right anterior chest. Chest tube is in place. There are

diminished breath sounds at bilateral bases, right greater than left. Left is

clear.

HEART: S1 and S2, sinus rhythm. No murmurs, rubs or gallops.

ABDOMEN: Soft, nontender and nondistended. Positive bowel sounds. 

EXTREMITIES: No cyanosis, clubbing or pitting edema.



LABORATORY DATA: Fingersticks: 273, 340, 311. White count 8.6, hemoglobin 12,

hematocrit 42, platelet count 137,000. Sodium 141, potassium 4, chloride 102,

bicarbonate 35, BUN 18, creatinine is 0.4, glucose of 96. 



ASSESSMENT AND PLAN:  This is a 65-year-old male with a history of COPD,

idiopathic pulmonary fibrosis, SLE managed by his rheumatologist, pulmonologist

as an outpatient with Cytoxan planned for the future and chronic Plaquenil and

prednisone, presented to the ER with shortness of breath, found to have

spontaneous pneumothorax, status post chest tube on 03/05 to 03/08 with

recurrent pneumothorax, reinserted on 03/10, status post blood patch 03/17 and

03/19/2021. The patient continues to have persistent right sided spontaneous

pneumothorax with plans for talc pleurodesis today. 



  1.  Spontaneous pneumothorax on the right with recurrence, status post blood

      patch on 03/17 and 03/19, talc pleurodesis on 03/22.

  2.  History of idiopathic pulmonary fibrosis, SLE on chronic prednisone and

      Plaquenil. 

  3.  Type 2 diabetes, uncontrolled. 

  4.  Reflux.

  5.  COPD. 

  6.  Alveolar pleural fistula.

  7.  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

  8.  COPD.

  9.  Thrombocytopenia, improving with negative Heparin induced

      thrombocytopenia panel.



PLAN: Patient remains with recurrent pneumothorax, talc pleurodesis planned by

Thoracic Surgery today. Chest tube managed per Thoracic Surgery. Patient's

Levemir should be increased to 12 units sub q. b.i.d. for better glycemic

control. Resume all  other home medications. 

MTDD

## 2021-03-22 NOTE — IPNPDOC
Date Seen


The patient was seen on 3/22/21.


Hospitalist progress note was dictated. Job #[id number]. Please have unit clerk call 

[employer] at [phone] for stat transcription if needed urgently.





Progress Note


SUBJECTIVE: Patient is a -year-old [RACE] [GENDER] with 





OBJECTIVE


PHYSICAL EXAMINATION:


VITAL SIGNS: Please see below. 


GENERAL: 


HEENT: 


CARDIOVASCULAR: .


RESPIRATORY: .


ABDOMINAL: 


EXTREMITIES: 


NEUROLOGICAL: 


PSYCHOLOGICAL: 





LABORATORY DATA, IMAGING STUDIES, MICROBIOLOGY: Please see below.





Echocardiogram: .





DVT prophylaxis ordered?: 





ASSESSMENT AND PLAN: This is a -year-old [RACE] [GENDER] with .





PROBLEMS:


1. : .





2. : .





3. : .





DISPOSITION: .





VS, I&O, 24H, Fishbone


Vital Signs/I&O





Vital Signs








  Date Time  Temp Pulse Resp B/P (MAP) Pulse Ox O2 Delivery O2 Flow Rate FiO2


 


3/22/21 08:16       2.0 


 


3/22/21 04:00 98.0 80 18 116/67 (83) 100 Nasal Cannula  


 


3/17/21 08:00        98














I&O- Last 24 Hours up to 6 AM 


 


 3/22/21





 06:00


 


Intake Total 2403 ml


 


Output Total 1913 ml


 


Balance 490 ml











Laboratory Data


24H LABS


Laboratory Tests 2


3/21/21 12:18: Bedside Glucose (Misc Panel) 273H


3/21/21 17:03: Bedside Glucose (Misc Panel) 340H


3/21/21 19:46: Bedside Glucose (Misc Panel) 311H


3/22/21 05:33: 


Nucleated Red Blood Cells % (auto) 0.0, Anion Gap 4L, Glomerular Filtration Rate

 > 60.0, Calcium Level 7.9L


CBC/BMP


Laboratory Tests


3/22/21 05:33

















ABDULLAHI SAINI MD            Mar 22, 2021 08:50

## 2021-03-22 NOTE — REP
INDICATION:

pneumothorax.



COMPARISON:

03/22/2021, 7:56 a.m..



TECHNIQUE:

SINGLE PORTABLE AP VIEW OF THE CHEST WAS PERFORMED.



FINDINGS:

Very small right apical pneumothorax is again noted.  There are now 2 right chest

tubes overlying the right hemithorax.  There is a right central venous catheter with

the tip in the right atrium.  Heart, lungs and mediastinum appear unchanged.



IMPRESSION:

Two right chest tubes.  Very small right apical pneumothorax.  Diffuse bilateral

parenchymal opacities appear unchanged.





<Electronically signed by Richard Santizo > 03/22/21 2102

## 2021-03-22 NOTE — REP
INDICATION:

pneumothorax.



COMPARISON:

03/21/2021



TECHNIQUE:

Upright PA and lateral chest.



FINDINGS:

There is a small right apical pneumothorax that has recurred.

The right thoracotomy tube is unchanged.

The subcutaneous emphysema along the right lateral chest wall is unchanged.

The right subclavian central venous catheter is unchanged.

Diffuse interstitial coarsening of the right lung is unchanged.

Left lung remains clear.





IMPRESSION:

There is a small recurrent right apical pneumothorax.

No other interval change.





<Electronically signed by Richard Junior > 03/22/21 0814

## 2021-03-23 VITALS — DIASTOLIC BLOOD PRESSURE: 50 MMHG | SYSTOLIC BLOOD PRESSURE: 111 MMHG

## 2021-03-23 VITALS — DIASTOLIC BLOOD PRESSURE: 56 MMHG | SYSTOLIC BLOOD PRESSURE: 124 MMHG

## 2021-03-23 VITALS — SYSTOLIC BLOOD PRESSURE: 126 MMHG | DIASTOLIC BLOOD PRESSURE: 59 MMHG

## 2021-03-23 VITALS — SYSTOLIC BLOOD PRESSURE: 97 MMHG | DIASTOLIC BLOOD PRESSURE: 54 MMHG

## 2021-03-23 VITALS — SYSTOLIC BLOOD PRESSURE: 90 MMHG | DIASTOLIC BLOOD PRESSURE: 41 MMHG

## 2021-03-23 VITALS — DIASTOLIC BLOOD PRESSURE: 52 MMHG | SYSTOLIC BLOOD PRESSURE: 107 MMHG

## 2021-03-23 VITALS — DIASTOLIC BLOOD PRESSURE: 49 MMHG | SYSTOLIC BLOOD PRESSURE: 106 MMHG

## 2021-03-23 VITALS — SYSTOLIC BLOOD PRESSURE: 118 MMHG | DIASTOLIC BLOOD PRESSURE: 53 MMHG

## 2021-03-23 VITALS — DIASTOLIC BLOOD PRESSURE: 48 MMHG | SYSTOLIC BLOOD PRESSURE: 118 MMHG

## 2021-03-23 VITALS — SYSTOLIC BLOOD PRESSURE: 129 MMHG | DIASTOLIC BLOOD PRESSURE: 57 MMHG

## 2021-03-23 VITALS — SYSTOLIC BLOOD PRESSURE: 125 MMHG | DIASTOLIC BLOOD PRESSURE: 56 MMHG

## 2021-03-23 VITALS — DIASTOLIC BLOOD PRESSURE: 55 MMHG | SYSTOLIC BLOOD PRESSURE: 109 MMHG

## 2021-03-23 VITALS — SYSTOLIC BLOOD PRESSURE: 108 MMHG | DIASTOLIC BLOOD PRESSURE: 45 MMHG

## 2021-03-23 VITALS — DIASTOLIC BLOOD PRESSURE: 45 MMHG | SYSTOLIC BLOOD PRESSURE: 102 MMHG

## 2021-03-23 VITALS — DIASTOLIC BLOOD PRESSURE: 52 MMHG | SYSTOLIC BLOOD PRESSURE: 108 MMHG

## 2021-03-23 VITALS — SYSTOLIC BLOOD PRESSURE: 105 MMHG | DIASTOLIC BLOOD PRESSURE: 51 MMHG

## 2021-03-23 VITALS — SYSTOLIC BLOOD PRESSURE: 118 MMHG | DIASTOLIC BLOOD PRESSURE: 55 MMHG

## 2021-03-23 VITALS — SYSTOLIC BLOOD PRESSURE: 113 MMHG | DIASTOLIC BLOOD PRESSURE: 50 MMHG

## 2021-03-23 VITALS — DIASTOLIC BLOOD PRESSURE: 56 MMHG | SYSTOLIC BLOOD PRESSURE: 92 MMHG

## 2021-03-23 VITALS — DIASTOLIC BLOOD PRESSURE: 51 MMHG | SYSTOLIC BLOOD PRESSURE: 117 MMHG

## 2021-03-23 VITALS — DIASTOLIC BLOOD PRESSURE: 51 MMHG | SYSTOLIC BLOOD PRESSURE: 113 MMHG

## 2021-03-23 VITALS — SYSTOLIC BLOOD PRESSURE: 108 MMHG | DIASTOLIC BLOOD PRESSURE: 49 MMHG

## 2021-03-23 VITALS — DIASTOLIC BLOOD PRESSURE: 55 MMHG | SYSTOLIC BLOOD PRESSURE: 127 MMHG

## 2021-03-23 VITALS — SYSTOLIC BLOOD PRESSURE: 106 MMHG | DIASTOLIC BLOOD PRESSURE: 47 MMHG

## 2021-03-23 VITALS — SYSTOLIC BLOOD PRESSURE: 116 MMHG | DIASTOLIC BLOOD PRESSURE: 53 MMHG

## 2021-03-23 VITALS — DIASTOLIC BLOOD PRESSURE: 53 MMHG | SYSTOLIC BLOOD PRESSURE: 109 MMHG

## 2021-03-23 VITALS — DIASTOLIC BLOOD PRESSURE: 58 MMHG | SYSTOLIC BLOOD PRESSURE: 127 MMHG

## 2021-03-23 VITALS — DIASTOLIC BLOOD PRESSURE: 51 MMHG | SYSTOLIC BLOOD PRESSURE: 101 MMHG

## 2021-03-23 VITALS — SYSTOLIC BLOOD PRESSURE: 92 MMHG | DIASTOLIC BLOOD PRESSURE: 47 MMHG

## 2021-03-23 VITALS — SYSTOLIC BLOOD PRESSURE: 108 MMHG | DIASTOLIC BLOOD PRESSURE: 51 MMHG

## 2021-03-23 VITALS — DIASTOLIC BLOOD PRESSURE: 48 MMHG | SYSTOLIC BLOOD PRESSURE: 83 MMHG

## 2021-03-23 VITALS — DIASTOLIC BLOOD PRESSURE: 52 MMHG | SYSTOLIC BLOOD PRESSURE: 86 MMHG

## 2021-03-23 VITALS — SYSTOLIC BLOOD PRESSURE: 120 MMHG | DIASTOLIC BLOOD PRESSURE: 52 MMHG

## 2021-03-23 VITALS — SYSTOLIC BLOOD PRESSURE: 129 MMHG | DIASTOLIC BLOOD PRESSURE: 56 MMHG

## 2021-03-23 VITALS — DIASTOLIC BLOOD PRESSURE: 46 MMHG | SYSTOLIC BLOOD PRESSURE: 110 MMHG

## 2021-03-23 VITALS — DIASTOLIC BLOOD PRESSURE: 58 MMHG | SYSTOLIC BLOOD PRESSURE: 110 MMHG

## 2021-03-23 VITALS — SYSTOLIC BLOOD PRESSURE: 130 MMHG | DIASTOLIC BLOOD PRESSURE: 59 MMHG

## 2021-03-23 VITALS — SYSTOLIC BLOOD PRESSURE: 130 MMHG | DIASTOLIC BLOOD PRESSURE: 58 MMHG

## 2021-03-23 LAB
BASE EXCESS BLDA CALC-SCNC: 4.4 MMOL/L (ref -2–2)
BASOPHILS # BLD AUTO: 0.1 10^3/UL (ref 0–0.2)
BASOPHILS NFR BLD AUTO: 0.7 % (ref 0–1)
BUN SERPL-MCNC: 21 MG/DL (ref 7–18)
CALCIUM SERPL-MCNC: 8.2 MG/DL (ref 8.8–10.2)
CHLORIDE SERPL-SCNC: 100 MEQ/L (ref 98–107)
CO2 BLDA CALC-SCNC: 33.5 MEQ/L (ref 23–31)
CO2 SERPL-SCNC: 37 MEQ/L (ref 21–32)
CREAT SERPL-MCNC: 0.52 MG/DL (ref 0.7–1.3)
EOSINOPHIL # BLD AUTO: 0.1 10^3/UL (ref 0–0.5)
EOSINOPHIL NFR BLD AUTO: 0.7 % (ref 0–3)
GFR SERPL CREATININE-BSD FRML MDRD: > 60 ML/MIN/{1.73_M2} (ref 49–?)
GLUCOSE SERPL-MCNC: 171 MG/DL (ref 70–100)
HCO3 BLDA-SCNC: 31.7 MEQ/L (ref 22–26)
HCO3 STD BLDA-SCNC: 28.4 MEQ/L (ref 22–26)
HCT VFR BLD AUTO: 48.8 % (ref 42–52)
HGB BLD-MCNC: 14.6 G/DL (ref 13.5–17.5)
LYMPHOCYTES # BLD AUTO: 1 10^3/UL (ref 1.5–5)
LYMPHOCYTES NFR BLD AUTO: 6.8 % (ref 24–44)
MCH RBC QN AUTO: 29 PG (ref 27–33)
MCHC RBC AUTO-ENTMCNC: 29.9 G/DL (ref 32–36.5)
MCV RBC AUTO: 96.8 FL (ref 80–96)
MONOCYTES # BLD AUTO: 1.1 10^3/UL (ref 0–0.8)
MONOCYTES NFR BLD AUTO: 7.1 % (ref 2–8)
NEUTROPHILS # BLD AUTO: 12.5 10^3/UL (ref 1.5–8.5)
NEUTROPHILS NFR BLD AUTO: 82.2 % (ref 36–66)
PCO2 BLDA: 58.6 MMHG (ref 35–45)
PH BLDA: 7.35 UNITS (ref 7.35–7.45)
PLATELET # BLD AUTO: 216 10^3/UL (ref 150–450)
PO2 BLDA: 97.3 MMHG (ref 75–100)
POTASSIUM SERPL-SCNC: 4.7 MEQ/L (ref 3.5–5.1)
RBC # BLD AUTO: 5.04 10^6/UL (ref 4.3–6.1)
SAO2 % BLDA: 98.2 % (ref 95–99)
SODIUM SERPL-SCNC: 138 MEQ/L (ref 136–145)
WBC # BLD AUTO: 15.2 10^3/UL (ref 4–10)

## 2021-03-23 PROCEDURE — 0BBF0ZZ EXCISION OF RIGHT LOWER LUNG LOBE, OPEN APPROACH: ICD-10-PCS | Performed by: THORACIC SURGERY (CARDIOTHORACIC VASCULAR SURGERY)

## 2021-03-23 RX ADMIN — KETOROLAC TROMETHAMINE SCH MG: 30 INJECTION, SOLUTION INTRAMUSCULAR at 23:33

## 2021-03-23 RX ADMIN — CEFAZOLIN SODIUM SCH MLS/HR: 1 INJECTION, POWDER, FOR SOLUTION INTRAMUSCULAR; INTRAVENOUS at 21:54

## 2021-03-23 RX ADMIN — Medication SCH MLS/HR: at 17:05

## 2021-03-23 RX ADMIN — LEVALBUTEROL HYDROCHLORIDE SCH MG: 1.25 SOLUTION, CONCENTRATE RESPIRATORY (INHALATION) at 19:38

## 2021-03-23 RX ADMIN — DOCUSATE SODIUM SCH MG: 100 CAPSULE, LIQUID FILLED ORAL at 09:00

## 2021-03-23 RX ADMIN — SODIUM CHLORIDE, PRESERVATIVE FREE SCH ML: 5 INJECTION INTRAVENOUS at 14:00

## 2021-03-23 RX ADMIN — KETOROLAC TROMETHAMINE SCH MG: 30 INJECTION, SOLUTION INTRAMUSCULAR at 17:08

## 2021-03-23 RX ADMIN — MIDODRINE HYDROCHLORIDE SCH MG: 5 TABLET ORAL at 09:03

## 2021-03-23 RX ADMIN — KETOROLAC TROMETHAMINE SCH MG: 30 INJECTION, SOLUTION INTRAMUSCULAR at 04:31

## 2021-03-23 RX ADMIN — MAGNESIUM HYDROXIDE SCH ML: 400 SUSPENSION ORAL at 09:00

## 2021-03-23 RX ADMIN — INSULIN LISPRO SCH UNITS: 100 INJECTION, SOLUTION INTRAVENOUS; SUBCUTANEOUS at 22:11

## 2021-03-23 RX ADMIN — SODIUM CHLORIDE, PRESERVATIVE FREE SCH ML: 5 INJECTION INTRAVENOUS at 05:32

## 2021-03-23 RX ADMIN — SODIUM CHLORIDE, PRESERVATIVE FREE SCH ML: 5 INJECTION INTRAVENOUS at 22:15

## 2021-03-23 RX ADMIN — MIDODRINE HYDROCHLORIDE SCH MG: 5 TABLET ORAL at 12:54

## 2021-03-23 RX ADMIN — INSULIN DETEMIR SCH UNITS: 100 INJECTION, SOLUTION SUBCUTANEOUS at 22:02

## 2021-03-23 RX ADMIN — SODIUM CHLORIDE SCH UNITS: 4.5 INJECTION, SOLUTION INTRAVENOUS at 21:54

## 2021-03-23 RX ADMIN — DEXTROSE MONOHYDRATE SCH MG: 50 INJECTION, SOLUTION INTRAVENOUS at 09:04

## 2021-03-23 RX ADMIN — LEVALBUTEROL HYDROCHLORIDE SCH MG: 1.25 SOLUTION, CONCENTRATE RESPIRATORY (INHALATION) at 01:38

## 2021-03-23 RX ADMIN — POTASSIUM CHLORIDE, DEXTROSE MONOHYDRATE AND SODIUM CHLORIDE SCH MLS/HR: 150; 5; 900 INJECTION, SOLUTION INTRAVENOUS at 17:59

## 2021-03-23 RX ADMIN — HYDROXYCHLOROQUINE SULFATE SCH MG: 200 TABLET, FILM COATED ENTERAL at 09:03

## 2021-03-23 RX ADMIN — POTASSIUM CHLORIDE, DEXTROSE MONOHYDRATE AND SODIUM CHLORIDE SCH MLS/HR: 150; 5; 900 INJECTION, SOLUTION INTRAVENOUS at 04:31

## 2021-03-23 RX ADMIN — CEFAZOLIN SODIUM SCH MLS/HR: 1 INJECTION, POWDER, FOR SOLUTION INTRAMUSCULAR; INTRAVENOUS at 12:54

## 2021-03-23 RX ADMIN — LEVALBUTEROL HYDROCHLORIDE SCH MG: 1.25 SOLUTION, CONCENTRATE RESPIRATORY (INHALATION) at 13:33

## 2021-03-23 RX ADMIN — INSULIN LISPRO SCH UNITS: 100 INJECTION, SOLUTION INTRAVENOUS; SUBCUTANEOUS at 07:30

## 2021-03-23 RX ADMIN — HYDROXYCHLOROQUINE SULFATE SCH MG: 200 TABLET, FILM COATED ENTERAL at 21:55

## 2021-03-23 RX ADMIN — KETOROLAC TROMETHAMINE SCH MG: 30 INJECTION, SOLUTION INTRAMUSCULAR at 12:56

## 2021-03-23 RX ADMIN — MIDODRINE HYDROCHLORIDE SCH MG: 5 TABLET ORAL at 17:08

## 2021-03-23 RX ADMIN — INSULIN LISPRO SCH UNITS: 100 INJECTION, SOLUTION INTRAVENOUS; SUBCUTANEOUS at 17:59

## 2021-03-23 RX ADMIN — SODIUM CHLORIDE SCH UNITS: 4.5 INJECTION, SOLUTION INTRAVENOUS at 09:04

## 2021-03-23 RX ADMIN — INSULIN DETEMIR SCH UNITS: 100 INJECTION, SOLUTION SUBCUTANEOUS at 09:00

## 2021-03-23 RX ADMIN — INSULIN LISPRO SCH UNITS: 100 INJECTION, SOLUTION INTRAVENOUS; SUBCUTANEOUS at 12:54

## 2021-03-23 RX ADMIN — DOCUSATE SODIUM SCH MG: 100 CAPSULE, LIQUID FILLED ORAL at 21:52

## 2021-03-23 RX ADMIN — ATOVAQUONE SCH MG: 750 SUSPENSION ORAL at 09:04

## 2021-03-23 RX ADMIN — LEVALBUTEROL HYDROCHLORIDE SCH MG: 1.25 SOLUTION, CONCENTRATE RESPIRATORY (INHALATION) at 07:27

## 2021-03-23 RX ADMIN — CEFAZOLIN SODIUM SCH MLS/HR: 1 INJECTION, POWDER, FOR SOLUTION INTRAMUSCULAR; INTRAVENOUS at 04:31

## 2021-03-23 NOTE — IPN
PROGRESS NOTE



DATE:  03/23/2021



Dictated by Jerad Curran DO in conjunction with Cruz Hay M.D. 



SUBJECTIVE: No acute events overnight. The patient is postop day #1 for talc

pleurodesis in the setting of recurrent pneumothorax. He has not had any

increased pain requirements overnight. He continues to be on Tony-Synephrine,

but his pain has been well-controlled and his epidural was turned down

overnight to accommodate his lower blood pressures. He currently denies any

fever, chills, chest pain, or shortness of breath. He does have a nonproductive

cough. He denies any abdominal pain, nausea, vomiting, or change in bowel

patterns. He admits to passing gas. He admits to having an appetite. Denies any

lower extremity swelling or edema.  



OBJECTIVE: 

VITAL SIGNS: T-max overnight of 98 degrees Fahrenheit, pulse of 71, respiratory

rate of 16, blood pressure 108/52 measured through an arterial line, satting

98% on 2 liters nasal cannula. 

INTAKE AND OUTPUT: In the last 24 hours, he has had 1500 mL of urine output,

155 mL of chest tube output from his two right-sided chest tubes, two voids,

and one bowel movement. Chest tube with positive air leak. 

GENERAL: The patient is a thin appearing male who appears his stated age,

sitting upright in bed, in no acute distress being able to complete sentences. 

HEENT: Head is normocephalic, atraumatic. EOMI. No scleral icterus. Trachea is

midline. Mucous membranes are somewhat dry appearing. 

CARDIOVASCULAR: Regular rate and rhythm. Normal S1, S2. No murmurs, gallops, or

rubs.

RESPIRATORY: He does exhibit what I believe is bronchophony probably from his

chest tube in the right lower lobe. No adventitious breath sounds in the left

lower lobe or left upper lobe. Some scattered rhonchi throughout the right

upper and lower lobe.

ABDOMEN: Soft, nontender, and nondistended. Bowel sounds present. No CVA

tenderness. 

EXTREMITIES: No edema or swelling.

SKIN: Warm, dry, and perfuse. 

NEUROLOGIC: Cranial nerves 2 through 12 are grossly intact. Gross sensation is

intact. Gait was not tested. 

PSYCHIATRIC: He is awake, alert, and oriented x3. 



LABORATORY DATA: White blood cell count of 15.2, hemoglobin 14.6, hematocrit

48.8, platelet count of 216,000. ABG from this morning showing pH of 7.35, pCO2

of 58.6, PaO2 of 97.3. Sodium 138, potassium 4.7, chloride 100, bicarb 37, BUN

21, creatinine 0.52, glucose 171, calcium 8.2. 



Lung pathology is pending. 



IMAGING DATA: Chest x-ray showing decreased subcutaneous emphysema compared to

yesterday. Chest tube is in good position in the apical portion of the right

lung. His right subclavian vein is seen in appropriate position. Otherwise,

unremarkable chest x-ray.



ASSESSMENT AND PLAN: Mr. Christine is a 65-year-old male with history of recurrent

pneumothorax who is status post talc pleurodesis on 03/22/2021. 

1.  Recurrent pneumothorax status post talc pleurodesis. He is postoperative

    day #1. I would anticipate that he becomes febrile in the next 24-48 simply

    from the procedure itself. He continues to have output from his chest tubes.

    They are in good position currently; however, because of the air leak, we

    will increase the suction to 40 mmHg to allow for more adequate drainage to

    allow for the air to leave the chest cavity more adequately. He is

    currently on D5 normal saline and we will increase the rate of that, as I

    believe that he is a little bit volume down and will come off of the

    Tony-Synephrine once he gets a little bit of volume back, since he has had

    good output. He has a central venous pressure (CVP) of 6. Outside of that,

    his pain has been well-controlled. He is able to be on a regular diet this

    morning, he is interested in eating, and so he should. Okay to turn off the

    Tony-Synephrine once his systolic blood pressure (SBP) is greater than 100. 



Attending note:  agree with above. Has very coarse rhonchi right side with velco
crackles on left in late inspiration.   Need to keep lung applied to chest wall 
for the duration of his chest tube placement.  Because of his underlying 
pulmonary fibrosis, his lung compliance is very low and the tendency to pull 
away from the chest wall.   I will therefore increase his suction to 40 cm Hg.  
On the whole, I am quite gratified by his progress and survival after surgery. 
-- ROSA Hay MD

[facility]

## 2021-03-23 NOTE — REP
INDICATION:

pneumothorax.



COMPARISON:

Portable chest 03/22/2021



TECHNIQUE:

Upright PA and lateral chest.



FINDINGS:

The 2 right thoracotomy tubes are unchanged.

The small right apical pneumothorax is unchanged.

The subcutaneous emphysema along the right lateral chest wall has decreased.

Questionable nondisplaced fracture of the tip of the right scapula versus

superimposition artifact.

Right subclavian central venous catheter again seen with the tip in the right atrium

in satisfactory position, unchanged.

Epidural catheter, unchanged.

Diffuse bilateral interstitial coarsening, unchanged.





IMPRESSION:

The subcutaneous emphysema along the right lateral chest wall has decreased.

Question fracture tip of the right scapular versus superimposition artifact.

No other interval change.





<Electronically signed by Richard Junior > 03/23/21 0802

## 2021-03-23 NOTE — IPNPDOC
Date Seen


The patient was seen on 3/23/21.





Progress Note


SUBJECTIVE: 


POD 1 talc pleurodesis. 500 cc bolus today for low CVP 6, improved slightly BP. 

Instructions now to keep MAP >65 mmHg. Remains on phenylephrine. Pain is 

controlled currently but will see how he does with epidural decreased to 4, as 

pain medications in combination of possible hypovolemia could be contributing to

hypotension currently. Patient denies incr chest pain, shortness of breath, 

n/v/d. 





OBJECTIVE: 





PHYSICAL EXAM: 


VITAL SIGNS: Please see below 


GENERAL:In NAD, resting in bed


LUNGS: Diminished breath sounds b/l bases, crackles mild b/l. 


CVS: S1 and S2, sinus rhythm. No murmurs, rubs or gallops.


GI:  Soft, nontender and nondistended. Positive bowel sounds.


CHEST: Right side chest tubes, left subclavian


EXTREMITIES: No cyanosis, clubbing or pitting edema. Left ext PICC line, left 

radial line 


: monteiro catheter 


NEURO: CN 2-12 intact, no focal deficits. 





LABORATORY DATA: Please see below 





MICROBIOLOGY: Please see below 





IMAGING: 


CXR 3/23/21: 


The subcutaneous emphysema along the right lateral chest wall has decreased. 

Question fracture tip of the right scapular versus superimposition artifact. No 

other interval change.





ASSESSMENT AND PLAN:  This is a 65-year-old male with a history of COPD, 

idiopathic pulmonary fibrosis, SLE managed by his rheumatologist, pulmonologist 

as an outpatient with Cytoxan planned for the future and chronic Plaquenil and 

prednisone, presented to the ER with shortness of breath, found to have 

spontaneous pneumothorax, status post chest tube on 03/05 to 03/08 with


recurrent pneumothorax, reinserted on 03/10, status post blood patch 03/17 and 

03/19/2021. Admitted for persistent right sided spontaneous pneumothorax , POD 1

talc pleurodesis  03/22/21. 





PLAN: 





  1.  Spontaneous pneumothorax on the right with recurrence, status post blood 

patch on 03/17 and 03/19, POD 1 talc pleurodesis on 03/22.


  2.  Hypotension likely multifactorial to pain medication/epidural with 

Fentanyl/bupivacaine, poss hypovolemia 


  3.  Type 2 diabetes


  4.  History of idiopathic pulmonary fibrosis, SLE on chronic prednisone and 

Plaquenil. 


  5.  COPD. 


  6.  Alveolar pleural fistula.


  7.  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


  8.  Thrombocytopenia, improving with negative Heparin induced thrombocytopenia

panel.


  9.  GERD





DVT px: Heparin SC 








PLAN: CT surgery consulted and following closely. Decreasing epidural to see if 

tolerates, adding IS to see if can wean down pressor support. Given one 500 cc 

bolus NSS; however, can give more if MAP not maintained >65 mmHg and pressor 

support going up. CVP minimally increased with this fluid, remains on continuous

fluids at 75 cc/hr. Chest tube managed per Thoracic Surgery.





TOTAL ICU TIME SPENT CARING FOR PATIENT (nonprocedural) : 55 mins





VS, I&O, 24H, Fishbone


Vital Signs/I&O





Vital Signs








  Date Time  Temp Pulse Resp B/P (MAP) Pulse Ox O2 Delivery O2 Flow Rate FiO2


 


3/23/21 13:00  74 18 126/59 (77) 99 Nasal Cannula 2.0 


 


3/23/21 08:00 97.6       


 


3/17/21 08:00        98














I&O- Last 24 Hours up to 6 AM 


 


 3/23/21





 06:00


 


Intake Total 1926 ml


 


Output Total 1535 ml


 


Balance 391 ml











Laboratory Data


24H LABS


Laboratory Tests 2


3/22/21 15:58: 


Blood Gas Bicarbonate Standard 27.2H, Arterial Blood pH 7.308L, Arterial Blood 

Partial Pressure CO2 64.2*H, Arterial Blood Partial Pressure O2 130.1H, Arterial

Blood Total CO2 33.4H, Arterial Blood HCO3 31.5H, Arterial Blood Base Excess 

3.0H, Arterial Blood Oxygen Saturation 99.1H


3/22/21 16:05: 


Immature Granulocyte % (Auto) 4.3H, Neutrophils (%) (Auto) 89.0H, Lymphocytes 

(%) (Auto) 3.1L, Monocytes (%) (Auto) 1.4L, Eosinophils (%) (Auto) 1.3, 

Basophils (%) (Auto) 0.9, Neutrophils # (Auto) 10.3H, Lymphocytes # (Auto) 0.4L,

Monocytes # (Auto) 0.2, Eosinophils # (Auto) 0.2, Basophils # (Auto) 0.1, 

Nucleated Red Blood Cells % (auto) 0.0, Anion Gap 3L, Glomerular Filtration Rate

> 60.0, Calcium Level 8.3L


3/22/21 17:48: Bedside Glucose (Misc Panel) 223H


3/22/21 20:37: Bedside Glucose (Misc Panel) 207H


3/23/21 04:00: 


Immature Granulocyte % (Auto) 2.5, Neutrophils (%) (Auto) 82.2H, Lymphocytes (%)

(Auto) 6.8L, Monocytes (%) (Auto) 7.1, Eosinophils (%) (Auto) 0.7, Basophils (%)

(Auto) 0.7, Neutrophils # (Auto) 12.5H, Lymphocytes # (Auto) 1.0L, Monocytes # 

(Auto) 1.1H, Eosinophils # (Auto) 0.1, Basophils # (Auto) 0.1, Nucleated Red 

Blood Cells % (auto) 0.0, Anion Gap 1L, Glomerular Filtration Rate > 60.0, 

Calcium Level 8.2L


3/23/21 05:50: 


Blood Gas Bicarbonate Standard 28.4H, Arterial Blood pH 7.351, Arterial Blood 

Partial Pressure CO2 58.6H, Arterial Blood Partial Pressure O2 97.3, Arterial 

Blood Total CO2 33.5H, Arterial Blood HCO3 31.7H, Arterial Blood Base Excess 

4.4H, Arterial Blood Oxygen Saturation 98.2


3/23/21 12:35: Bedside Glucose (Misc Panel) 158H


CBC/BMP


Laboratory Tests


3/22/21 16:05








3/23/21 04:00











Current Medications





Current Medications








 Medications


  (Trade)  Dose


 Ordered  Sig/Sadie


 Route


 PRN Reason  Start Time


 Stop Time Status Last Admin


Dose Admin


 


 Acetaminophen


  (Tylenol Tab)  650 mg  Q6HP  PRN


 PO


 T > 101.5 or HA  3/22/21 16:00


     





 


 Acetaminophen


  (Tylenol Tab)  650 mg  Q6HP  PRN


 PO


 T > 101.5 or HA  3/5/21 15:25


   Cancel  





 


 Acetaminophen/


 Hydrocodone Bitart


  (Norco, Anexsia


 5/325)  1 tab  Q3H  PRN


 PO


 MILD PAIN (PS 1-4)  3/22/21 16:00


   Hold  





 


 Acetaminophen/


 Hydrocodone Bitart


  (Norco, Anexsia


 5/325)  1 tab  Q3H  PRN


 PO


 MILD PAIN (PS 1-4)  3/5/21 15:25


 3/22/21 15:58 DC 3/15/21 20:26





 


 Atovaquone


  (Mepron 750mg/


 5ml Suspension)  1,500 mg  DAILY


 PO


   3/6/21 09:00


    3/23/21 09:04





 


 Bisacodyl


  (Dulcolax


 Suppository)  10 mg  Q4HP  PRN


 MA


 CONSTIPATION  3/22/21 16:00


     





 


 Bisacodyl


  (Dulcolax


 Suppository)  10 mg  Q4HP  PRN


 MA


 CONSTIPATION  3/5/21 15:25


   Cancel  





 


 Cefazolin Sodium


 1 gm/Dextrose  50 ml @ 


 100 mls/hr  Q8H


 IV


   3/22/21 21:00


 3/24/21 13:29  3/23/21 12:54





 


 Dextrose


  (Dextrose 50%)  25 ml  ASDIRECTED  PRN


 IV


 SEE LABEL COMMENTS  3/5/21 17:35


   Cancel  





 


 Dextrose/Sodium


 Chloride  1,000 ml @ 


 75 mls/hr  [id number]


 IV


   3/5/21 17:10


 3/5/21 20:13 DC 3/5/21 17:32





 


 Diphenhydramine


 HCl


  (Benadryl)  12.5 mg  Q4HP  PRN


 IV


 ITCHING  3/22/21 14:40


   Cancel  





 


 Docusate Sodium


  (Colace)  100 mg  BID


 PO


   3/22/21 21:00


     





 


 Docusate Sodium


  (Colace)  100 mg  BID


 PO


   3/5/21 21:00


 3/22/21 15:58 DC 3/20/21 08:33





 


 Fentanyl Citrate


  (Sublimaze)  25 mcg  Q5MP  PRN


 IV


 PAIN LEVEL 5-10  3/22/21 17:30


 3/22/21 18:30 DC  





 


 Fentanyl/


 Bupivacaine HCl  250 ml @ 5


 mls/hr  Q24H


 EPIDURAL


   3/22/21 14:40


    3/22/21 16:14





 


 Glucagon


  (Glucagon)  1 mg  ASDIRECTED  PRN


 SC


 SEE LABEL COMMENTS  3/5/21 17:35


   Cancel  





 


 Glucose


  (Glucose)  16 GM  ASDIRECTED  PRN


 PO


 SEE LABEL COMMENTS  3/5/21 17:35


   Cancel  





 


 Heparin Sodium


  (Heparin Lock


 Flush 10units/ml)  10 units  ASDIRECTED  PRN


 IV


 SEE LABEL COMMENTS  3/17/21 05:10


 3/22/21 15:58 DC 3/20/21 08:34





 


 Heparin Sodium


  (Porcine)


  (Heparin)  5,000 units  Q12H


 SC


   3/22/21 21:00


 3/23/21 07:45 DC  





 


 Heparin Sodium


  (Porcine)


  (Heparin)  5,000 units  Q12H


 SC


   3/5/21 21:00


    3/23/21 09:04





 


 Home Med


  (Med Rec


 Complete!)    ASDIRECTED


 XX


   3/5/21 16:15


 3/5/21 16:15 DC  





 


 Hydromorphone HCl


  (Dilaudid)  0.2 mg  Q5MP  PRN


 IV


 PAIN LEVEL 4-7  3/22/21 17:30


 3/22/21 18:30 DC  





 


 Hydroxychloroquine


 Sulfate


  (Plaquenil)  200 mg  BID


 PO


   3/5/21 21:00


    3/23/21 09:03





 


 Insulin Detemir


  (Levemir Insulin)  6 units  BID


 SC


   3/18/21 09:00


 3/22/21 08:46 DC 3/21/21 21:57





 


 Insulin Detemir


  (Levemir Insulin)  6 units  QHS


 SC


   3/8/21 21:00


 3/9/21 19:23 DC 3/8/21 20:57





 


 Insulin Detemir


  (Levemir Insulin)  8 units  QHS


 SC


   3/9/21 21:00


 3/18/21 07:46 DC 3/17/21 21:09





 


 Insulin Detemir


  (Levemir Insulin)  12 units  BID


 SC


   3/22/21 21:00


    3/22/21 20:39





 


 Insulin Human


 Lispro


  (HumaLOG INSULIN)  SEE


 PROTOCOL


 TABLE  AC


 SC


   3/6/21 07:30


    3/23/21 12:54





 


 Insulin Human


 Lispro


  (HumaLOG INSULIN)  SEE


 PROTOCOL


 TABLE  QHS


 SC


   3/5/21 21:00


    3/21/21 21:57





 


 Ketorolac


 Tromethamine


  (ToRADol)  15 mg  Q6H


 IV


   3/5/21 18:00


 3/10/21 17:59 DC 3/10/21 12:53





 


 Ketorolac


 Tromethamine


  (ToRADol)  30 mg  Q6H


 IV


   3/22/21 17:00


 3/27/21 16:59  3/23/21 12:56





 


 Levalbuterol HCl


  (Xopenex Neb)  1.25 mg  Q2HP  PRN


 NEB


 WHEEZING  3/5/21 15:25


     





 


 Levalbuterol HCl


  (Xopenex Neb)  1.25 mg  RQ6H


 NEB


   3/5/21 20:00


    3/23/21 13:33





 


 Lidocaine HCl


  (LIDOCAINE 1%


 MDV 20ml)  40 ml  STAT  STAT


 SC


   3/10/21 12:39


 3/10/21 12:41 DC 3/10/21 11:54





 


 Lidocaine HCl


  (Lmx 4/Anecream)  1 dose  Q8HP  PRN


 TOP


 PAIN  3/10/21 21:50


    3/10/21 23:45





 


 Magnesium


 Hydroxide


  (Milk Of


 Magnesia)  30 ml  DAILY


 PO


   3/23/21 09:00


     





 


 Magnesium


 Hydroxide


  (Milk Of


 Magnesia)  30 ml  DAILY


 PO


   3/6/21 09:00


 3/22/21 15:58 DC 3/10/21 09:04





 


 Metoclopramide HCl


  (REGLAN


 INJection)  10 mg  Q6HP  PRN


 IV


 NAUSEA  3/22/21 14:40


   Cancel  





 


 Midazolam HCl


  (Versed)  6 mg  ASDIRECTED  STAT


 IV


   3/10/21 12:39


 3/10/21 12:41 DC 3/10/21 11:50





 


 Midodrine


  (Proamatine)  5 mg  08,12,16


 PO


   3/19/21 13:00


    3/23/21 12:54





 


 Miscellaneous


  (Unresolved


 Clarification


 Entry)  SEE LABEL


 COMMENTS  DAILY


 XX


   3/11/21 09:00


 3/11/21 14:46 DC  





 


 Naloxone HCl


  (Narcan)  0.1 mg  Q5MP  PRN


 IV


 SEE LABEL COMMENTS  3/22/21 14:40


   Cancel  





 


 Non-Formulary


 Medication


  (Epidural/PCA


 Keys)  1 each  ASDIRECTED  PRN


 XX


 SEE LABEL COMMENTS  3/22/21 14:40


   Cancel  





 


 Non-Formulary


 Medication


  (Keys)    ASDIRECTED  PRN


 XX


 SEE LABEL COMMENTS  3/22/21 14:40


   Cancel  





 


 Ondansetron HCl


  (ZOFRAN


 INJection)  4 mg  Q4HP  PRN


 IV


 NAUSEA  3/22/21 16:00


     





 


 Ondansetron HCl


  (ZOFRAN


 INJection)  4 mg  Q4HP  PRN


 IV


 NAUSEA OR VOMITING  3/22/21 17:30


 3/22/21 18:30 DC  





 


 Ondansetron HCl


  (ZOFRAN


 INJection)  4 mg  Q4HP  PRN


 IV


 NAUSEA  3/5/21 15:25


   Cancel  





 


 Ondansetron HCl


  (ZOFRAN


 INJection)  4 mg  Q6HP  PRN


 IV


 REFRACTORY NAUSEA  3/22/21 14:40


   Cancel  





 


 Oxycodone HCl


  (Roxicodone,


 Oxyir)  5 mg  ASDIRECTED  PRN


 PO


 PAIN LEVEL 1-4  3/22/21 17:30


 3/22/21 18:30 DC  





 


 Oxycodone/


 Acetaminophen


  (Percocet 5mg/


 325mg Tablet)  1 tab  Q4H  PRN


 PO


 MODERATE PAIN (PS 5-7)  3/22/21 16:00


   Hold  





 


 Oxycodone/


 Acetaminophen


  (Percocet 5mg/


 325mg Tablet)  1 tab  Q4H  PRN


 PO


 MODERATE PAIN (PS 5-7)  3/5/21 15:25


 3/22/21 15:58 DC 3/19/21 21:59





 


 Oxycodone/


 Acetaminophen


  (Percocet 5mg/


 325mg Tablet)  2 tab  Q4H  PRN


 PO


 SEVERE PAIN (PS 8-10)  3/22/21 16:00


   Hold  





 


 Oxycodone/


 Acetaminophen


  (Percocet 5mg/


 325mg Tablet)  2 tab  Q4H  PRN


 PO


 SEVERE PAIN (PS 8-10)  3/5/21 15:25


 3/22/21 15:58 DC 3/12/21 22:59





 


 Pantoprazole


 Sodium


  (Protonix)  40 mg  DAILY


 IV


   3/23/21 09:00


    3/23/21 09:04





 


 Pantoprazole


 Sodium


  (Protonix)  40 mg  DAILY


 PO


   3/23/21 09:00


 3/23/21 07:46 DC  





 


 Pantoprazole


 Sodium


  (Protonix)  40 mg  DAILY


 PO


   3/6/21 09:00


 3/22/21 15:58 DC 3/22/21 09:18





 


 Phenylephrine HCl


 50 mg/Dextrose  500 ml @ 


 15 mls/hr  Q24H


 IV


   3/22/21 21:05


    3/22/21 23:24





 


 Potassium


 Chloride/Dextrose/


 Sod Cl  1,000 ml @ 


 75 mls/hr  [id number]


 IV


   3/22/21 15:58


    3/23/21 04:31





 


 Potassium


 Chloride/Dextrose/


 Sod Cl  1,000 ml @ 


 75 mls/hr  [id number]


 IV


   3/5/21 15:25


 3/5/21 17:11 DC  





 


 Prednisone


  (Deltasone)  60 mg  DAILY


 PO


   3/6/21 09:00


    3/23/21 09:04





 


 Sodium Chloride


  (Saline Lock


 Flush)  2 ml  ASDIRECTED  PRN


 IV


 SEE LABEL COMMENTS  3/15/21 08:00


     





 


 Sodium Chloride


  (Saline Lock


 Flush)  2 ml  SLF


 IV


   3/15/21 14:00


    3/23/21 05:32





 


 Sodium Chloride


  (Saline Lock


 Flush)  10 ml  ASDIRECTED  PRN


 IV


 SEE LABEL COMMENTS  3/17/21 05:10


 3/22/21 15:58 DC 3/20/21 08:35














Allergies


Coded Allergies:  


     No Known Drug Allergies (Verified  Allergy, Unknown, 3/5/21)











Jolanta Arias MD            Mar 23, 2021 13:26

## 2021-03-24 VITALS — DIASTOLIC BLOOD PRESSURE: 54 MMHG | SYSTOLIC BLOOD PRESSURE: 91 MMHG

## 2021-03-24 VITALS — SYSTOLIC BLOOD PRESSURE: 93 MMHG | DIASTOLIC BLOOD PRESSURE: 65 MMHG

## 2021-03-24 VITALS — SYSTOLIC BLOOD PRESSURE: 106 MMHG | DIASTOLIC BLOOD PRESSURE: 46 MMHG

## 2021-03-24 VITALS — DIASTOLIC BLOOD PRESSURE: 52 MMHG | SYSTOLIC BLOOD PRESSURE: 93 MMHG

## 2021-03-24 VITALS — SYSTOLIC BLOOD PRESSURE: 118 MMHG | DIASTOLIC BLOOD PRESSURE: 52 MMHG

## 2021-03-24 VITALS — DIASTOLIC BLOOD PRESSURE: 56 MMHG | SYSTOLIC BLOOD PRESSURE: 102 MMHG

## 2021-03-24 VITALS — SYSTOLIC BLOOD PRESSURE: 126 MMHG | DIASTOLIC BLOOD PRESSURE: 53 MMHG

## 2021-03-24 VITALS — SYSTOLIC BLOOD PRESSURE: 122 MMHG | DIASTOLIC BLOOD PRESSURE: 53 MMHG

## 2021-03-24 VITALS — DIASTOLIC BLOOD PRESSURE: 61 MMHG | SYSTOLIC BLOOD PRESSURE: 137 MMHG

## 2021-03-24 VITALS — DIASTOLIC BLOOD PRESSURE: 74 MMHG | SYSTOLIC BLOOD PRESSURE: 137 MMHG

## 2021-03-24 VITALS — DIASTOLIC BLOOD PRESSURE: 53 MMHG | SYSTOLIC BLOOD PRESSURE: 85 MMHG

## 2021-03-24 VITALS — SYSTOLIC BLOOD PRESSURE: 125 MMHG | DIASTOLIC BLOOD PRESSURE: 53 MMHG

## 2021-03-24 VITALS — SYSTOLIC BLOOD PRESSURE: 134 MMHG | DIASTOLIC BLOOD PRESSURE: 58 MMHG

## 2021-03-24 VITALS — DIASTOLIC BLOOD PRESSURE: 50 MMHG | SYSTOLIC BLOOD PRESSURE: 120 MMHG

## 2021-03-24 VITALS — DIASTOLIC BLOOD PRESSURE: 51 MMHG | SYSTOLIC BLOOD PRESSURE: 120 MMHG

## 2021-03-24 VITALS — SYSTOLIC BLOOD PRESSURE: 108 MMHG | DIASTOLIC BLOOD PRESSURE: 41 MMHG

## 2021-03-24 VITALS — SYSTOLIC BLOOD PRESSURE: 116 MMHG | DIASTOLIC BLOOD PRESSURE: 63 MMHG

## 2021-03-24 VITALS — DIASTOLIC BLOOD PRESSURE: 66 MMHG | SYSTOLIC BLOOD PRESSURE: 115 MMHG

## 2021-03-24 VITALS — SYSTOLIC BLOOD PRESSURE: 104 MMHG | DIASTOLIC BLOOD PRESSURE: 56 MMHG

## 2021-03-24 VITALS — DIASTOLIC BLOOD PRESSURE: 66 MMHG | SYSTOLIC BLOOD PRESSURE: 144 MMHG

## 2021-03-24 VITALS — SYSTOLIC BLOOD PRESSURE: 117 MMHG | DIASTOLIC BLOOD PRESSURE: 54 MMHG

## 2021-03-24 VITALS — SYSTOLIC BLOOD PRESSURE: 104 MMHG | DIASTOLIC BLOOD PRESSURE: 67 MMHG

## 2021-03-24 VITALS — DIASTOLIC BLOOD PRESSURE: 69 MMHG | SYSTOLIC BLOOD PRESSURE: 117 MMHG

## 2021-03-24 VITALS — SYSTOLIC BLOOD PRESSURE: 129 MMHG | DIASTOLIC BLOOD PRESSURE: 55 MMHG

## 2021-03-24 VITALS — DIASTOLIC BLOOD PRESSURE: 52 MMHG | SYSTOLIC BLOOD PRESSURE: 87 MMHG

## 2021-03-24 VITALS — DIASTOLIC BLOOD PRESSURE: 45 MMHG | SYSTOLIC BLOOD PRESSURE: 106 MMHG

## 2021-03-24 VITALS — SYSTOLIC BLOOD PRESSURE: 120 MMHG | DIASTOLIC BLOOD PRESSURE: 64 MMHG

## 2021-03-24 LAB
BASOPHILS # BLD AUTO: 0.1 10^3/UL (ref 0–0.2)
BASOPHILS NFR BLD AUTO: 0.6 % (ref 0–1)
BUN SERPL-MCNC: 19 MG/DL (ref 7–18)
CALCIUM SERPL-MCNC: 8 MG/DL (ref 8.8–10.2)
CHLORIDE SERPL-SCNC: 104 MEQ/L (ref 98–107)
CO2 SERPL-SCNC: 34 MEQ/L (ref 21–32)
CREAT SERPL-MCNC: 0.53 MG/DL (ref 0.7–1.3)
EOSINOPHIL # BLD AUTO: 0 10^3/UL (ref 0–0.5)
EOSINOPHIL NFR BLD AUTO: 0.3 % (ref 0–3)
GFR SERPL CREATININE-BSD FRML MDRD: > 60 ML/MIN/{1.73_M2} (ref 49–?)
GLUCOSE SERPL-MCNC: 260 MG/DL (ref 70–100)
HCT VFR BLD AUTO: 46.6 % (ref 42–52)
HGB BLD-MCNC: 14.1 G/DL (ref 13.5–17.5)
LYMPHOCYTES # BLD AUTO: 0.4 10^3/UL (ref 1.5–5)
LYMPHOCYTES NFR BLD AUTO: 3.3 % (ref 24–44)
MCH RBC QN AUTO: 29.6 PG (ref 27–33)
MCHC RBC AUTO-ENTMCNC: 30.3 G/DL (ref 32–36.5)
MCV RBC AUTO: 97.7 FL (ref 80–96)
MONOCYTES # BLD AUTO: 0.7 10^3/UL (ref 0–0.8)
MONOCYTES NFR BLD AUTO: 6.1 % (ref 2–8)
NEUTROPHILS # BLD AUTO: 10.3 10^3/UL (ref 1.5–8.5)
NEUTROPHILS NFR BLD AUTO: 87.1 % (ref 36–66)
PLATELET # BLD AUTO: 173 10^3/UL (ref 150–450)
POTASSIUM SERPL-SCNC: 5.2 MEQ/L (ref 3.5–5.1)
RBC # BLD AUTO: 4.77 10^6/UL (ref 4.3–6.1)
SODIUM SERPL-SCNC: 139 MEQ/L (ref 136–145)
WBC # BLD AUTO: 11.8 10^3/UL (ref 4–10)

## 2021-03-24 RX ADMIN — MIDODRINE HYDROCHLORIDE SCH MG: 5 TABLET ORAL at 09:15

## 2021-03-24 RX ADMIN — LEVALBUTEROL HYDROCHLORIDE SCH MG: 1.25 SOLUTION, CONCENTRATE RESPIRATORY (INHALATION) at 02:02

## 2021-03-24 RX ADMIN — DOCUSATE SODIUM SCH MG: 100 CAPSULE, LIQUID FILLED ORAL at 20:39

## 2021-03-24 RX ADMIN — HYDROXYCHLOROQUINE SULFATE SCH MG: 200 TABLET, FILM COATED ENTERAL at 09:11

## 2021-03-24 RX ADMIN — DEXTROSE MONOHYDRATE SCH MG: 50 INJECTION, SOLUTION INTRAVENOUS at 09:10

## 2021-03-24 RX ADMIN — LEVALBUTEROL HYDROCHLORIDE SCH MG: 1.25 SOLUTION, CONCENTRATE RESPIRATORY (INHALATION) at 14:36

## 2021-03-24 RX ADMIN — CEFAZOLIN SODIUM SCH MLS/HR: 1 INJECTION, POWDER, FOR SOLUTION INTRAMUSCULAR; INTRAVENOUS at 12:38

## 2021-03-24 RX ADMIN — HYDROXYCHLOROQUINE SULFATE SCH MG: 200 TABLET, FILM COATED ENTERAL at 20:39

## 2021-03-24 RX ADMIN — SODIUM CHLORIDE, PRESERVATIVE FREE SCH ML: 5 INJECTION INTRAVENOUS at 22:09

## 2021-03-24 RX ADMIN — INSULIN LISPRO SCH UNITS: 100 INJECTION, SOLUTION INTRAVENOUS; SUBCUTANEOUS at 09:10

## 2021-03-24 RX ADMIN — DOCUSATE SODIUM SCH MG: 100 CAPSULE, LIQUID FILLED ORAL at 09:00

## 2021-03-24 RX ADMIN — ATOVAQUONE SCH MG: 750 SUSPENSION ORAL at 09:11

## 2021-03-24 RX ADMIN — INSULIN LISPRO SCH UNITS: 100 INJECTION, SOLUTION INTRAVENOUS; SUBCUTANEOUS at 17:20

## 2021-03-24 RX ADMIN — KETOROLAC TROMETHAMINE SCH MG: 30 INJECTION, SOLUTION INTRAMUSCULAR at 05:05

## 2021-03-24 RX ADMIN — SODIUM CHLORIDE, PRESERVATIVE FREE SCH ML: 5 INJECTION INTRAVENOUS at 14:00

## 2021-03-24 RX ADMIN — Medication SCH MLS/HR: at 17:23

## 2021-03-24 RX ADMIN — KETOROLAC TROMETHAMINE SCH MG: 30 INJECTION, SOLUTION INTRAMUSCULAR at 22:05

## 2021-03-24 RX ADMIN — KETOROLAC TROMETHAMINE SCH MG: 30 INJECTION, SOLUTION INTRAMUSCULAR at 12:34

## 2021-03-24 RX ADMIN — KETOROLAC TROMETHAMINE SCH MG: 30 INJECTION, SOLUTION INTRAMUSCULAR at 17:20

## 2021-03-24 RX ADMIN — MIDODRINE HYDROCHLORIDE SCH MG: 5 TABLET ORAL at 17:20

## 2021-03-24 RX ADMIN — CEFAZOLIN SODIUM SCH MLS/HR: 1 INJECTION, POWDER, FOR SOLUTION INTRAMUSCULAR; INTRAVENOUS at 05:06

## 2021-03-24 RX ADMIN — INSULIN DETEMIR SCH UNITS: 100 INJECTION, SOLUTION SUBCUTANEOUS at 09:09

## 2021-03-24 RX ADMIN — SODIUM CHLORIDE SCH UNITS: 4.5 INJECTION, SOLUTION INTRAVENOUS at 20:40

## 2021-03-24 RX ADMIN — INSULIN LISPRO SCH UNITS: 100 INJECTION, SOLUTION INTRAVENOUS; SUBCUTANEOUS at 12:34

## 2021-03-24 RX ADMIN — LEVALBUTEROL HYDROCHLORIDE SCH MG: 1.25 SOLUTION, CONCENTRATE RESPIRATORY (INHALATION) at 08:03

## 2021-03-24 RX ADMIN — MIDODRINE HYDROCHLORIDE SCH MG: 5 TABLET ORAL at 12:34

## 2021-03-24 RX ADMIN — SODIUM CHLORIDE, PRESERVATIVE FREE SCH ML: 5 INJECTION INTRAVENOUS at 05:07

## 2021-03-24 RX ADMIN — SODIUM CHLORIDE SCH UNITS: 4.5 INJECTION, SOLUTION INTRAVENOUS at 09:11

## 2021-03-24 RX ADMIN — MAGNESIUM HYDROXIDE SCH ML: 400 SUSPENSION ORAL at 09:00

## 2021-03-24 RX ADMIN — LEVALBUTEROL HYDROCHLORIDE SCH MG: 1.25 SOLUTION, CONCENTRATE RESPIRATORY (INHALATION) at 20:20

## 2021-03-24 RX ADMIN — INSULIN LISPRO SCH UNITS: 100 INJECTION, SOLUTION INTRAVENOUS; SUBCUTANEOUS at 20:41

## 2021-03-24 NOTE — IPNPDOC
Date Seen


The patient was seen on 3/24/21.





Progress Note


SUBJECTIVE: 


Phenylephrine weaned off but pressures dropped some, started on IVFs at 100 

cc/hr and showing improvement.  120 cc out of chest tube overnight. Epidural 

decreased to 2 ml/hr, tolerating at decreased dose. Eating and drinking well, no

acute events over night. Patient denies incr chest pain, shortness of breath, 

n/v/d. 





OBJECTIVE: 





PHYSICAL EXAM: 


VITAL SIGNS: Please see below 


GENERAL:In NAD, resting in bed


LUNGS: Diminished breath sounds b/l bases, crackles mild b/l. 


CVS: S1 and S2, sinus rhythm. No murmurs, rubs or gallops.


GI:  Soft, nontender and nondistended. Positive bowel sounds.


CHEST: Right side chest tubes, subclavian line 


EXTREMITIES: No cyanosis, clubbing or pitting edema. Left ext PICC line, left 

radial line 


: monteiro catheter 


NEURO: CN 2-12 intact, no focal deficits. 





LABORATORY DATA: Please see below 





MICROBIOLOGY: Please see below 





IMAGING: 





CXR 3/24/21: 


Findings essentially unchanged.  Two right chest tubes remain in place at the 

apex.





CXR 3/23/21: 


The subcutaneous emphysema along the right lateral chest wall has decreased. 

Question fracture tip of the right scapular versus superimposition artifact. No 

other interval change.





ASSESSMENT AND PLAN:  This is a 65-year-old male with a history of COPD, 

idiopathic pulmonary fibrosis, SLE managed by his rheumatologist, pulmonologist 

as an outpatient with Cytoxan planned for the future and chronic Plaquenil and 

prednisone, presented to the ER with shortness of breath, found to have sponta

neous pneumothorax, status post chest tube on 03/05 to 03/08 with


recurrent pneumothorax, reinserted on 03/10, status post blood patch 03/17 and 

03/19/2021. Admitted for persistent right sided spontaneous pneumothorax , POD 2

talc pleurodesis  03/22/21. 





PLAN: 





  1.  Spontaneous pneumothorax on the right with recurrence, status post blood 

patch on 03/17 and 03/19, POD 2 talc pleurodesis on 03/22.


  2.  Hypotension likely multifactorial to pain medication/epidural with 

Fentanyl/bupivacaine, poss hypovolemia 


  3.  Type 2 diabetes


  4.  History of idiopathic pulmonary fibrosis, SLE on chronic prednisone and 

Plaquenil. 


  5.  Mild hyperkalemia, acute 


  6.  Alveolar pleural fistula.


  7.  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


  8.  Thrombocytopenia, improving with negative Heparin induced thrombocytopenia

panel.


  9.  GERD


 10. COPD.





DVT px: Heparin SC 








PLAN: Remains on 2 L NC, no increased SOB or chest pain. Currently on NS at 100 

cc/hr due to CVP reading, will continue to decreasing epidural, off pressors 

currently. Monitoring I&O's closely, o/p from chest tube. Uncontrolled blood 

sugars from 215-377, adjusted and increased both AM and HS dosing of levemir. Th

oracic surgery following closely





TOTAL ICU TIME SPENT CARING FOR PATIENT (nonprocedural) : 40 mins





VS, I&O, 24H, Fishbone


Vital Signs/I&O





Vital Signs








  Date Time  Temp Pulse Resp B/P (MAP) Pulse Ox O2 Delivery O2 Flow Rate FiO2


 


3/24/21 00:15  84  120/51 98   


 


3/24/21 00:00 97.6  20   Nasal Cannula  


 


3/24/21 00:00       2.0 














I&O- Last 24 Hours up to 6 AM 


 


 3/24/21





 06:00


 


Intake Total 2922.5 ml


 


Output Total 1140 ml


 


Balance 1782.5 ml











Laboratory Data


24H LABS


Laboratory Tests 2


3/23/21 05:50: 


Blood Gas Bicarbonate Standard 28.4H, Arterial Blood pH 7.351, Arterial Blood 

Partial Pressure CO2 58.6H, Arterial Blood Partial Pressure O2 97.3, Arterial 

Blood Total CO2 33.5H, Arterial Blood HCO3 31.7H, Arterial Blood Base Excess 

4.4H, Arterial Blood Oxygen Saturation 98.2


3/23/21 12:35: Bedside Glucose (Misc Panel) 158H


3/23/21 17:48: Bedside Glucose (Misc Panel) 214H


3/23/21 21:59: Bedside Glucose (Misc Panel) 377H


3/24/21 04:05: 


Immature Granulocyte % (Auto) 2.6, Neutrophils (%) (Auto) 87.1H, Lymphocytes (%)

(Auto) 3.3L, Monocytes (%) (Auto) 6.1, Eosinophils (%) (Auto) 0.3, Basophils (%)

(Auto) 0.6, Neutrophils # (Auto) 10.3H, Lymphocytes # (Auto) 0.4L, Monocytes # 

(Auto) 0.7, Eosinophils # (Auto) 0.0, Basophils # (Auto) 0.1, Nucleated Red 

Blood Cells % (auto) 0.0, Anion Gap 1L, Glomerular Filtration Rate > 60.0, 

Calcium Level 8.0L


CBC/BMP


Laboratory Tests


3/24/21 04:05











Current Medications





Current Medications








 Medications


  (Trade)  Dose


 Ordered  Sig/Sadie


 Route


 PRN Reason  Start Time


 Stop Time Status Last Admin


Dose Admin


 


 Acetaminophen


  (Tylenol Tab)  650 mg  Q6HP  PRN


 PO


 T > 101.5 or HA  3/22/21 16:00


     





 


 Acetaminophen


  (Tylenol Tab)  650 mg  Q6HP  PRN


 PO


 T > 101.5 or HA  3/5/21 15:25


   Cancel  





 


 Acetaminophen/


 Hydrocodone Bitart


  (Norco, Anexsia


 5/325)  1 tab  Q3H  PRN


 PO


 MILD PAIN (PS 1-4)  3/22/21 16:00


   Hold  





 


 Acetaminophen/


 Hydrocodone Bitart


  (Norco, Anexsia


 5/325)  1 tab  Q3H  PRN


 PO


 MILD PAIN (PS 1-4)  3/5/21 15:25


 3/22/21 15:58 DC 3/15/21 20:26





 


 Atovaquone


  (Mepron 750mg/


 5ml Suspension)  1,500 mg  DAILY


 PO


   3/6/21 09:00


    3/24/21 09:11





 


 Bisacodyl


  (Dulcolax


 Suppository)  10 mg  Q4HP  PRN


 [place]


 CONSTIPATION  3/22/21 16:00


     





 


 Bisacodyl


  (Dulcolax


 Suppository)  10 mg  Q4HP  PRN


 [place]


 CONSTIPATION  3/5/21 15:25


   Cancel  





 


 Cefazolin Sodium


 1 gm/Dextrose  50 ml @ 


 100 mls/hr  Q8H


 IV


   3/22/21 21:00


 3/24/21 13:29  3/24/21 12:38





 


 Dextrose


  (Dextrose 50%)  25 ml  ASDIRECTED  PRN


 IV


 SEE LABEL COMMENTS  3/5/21 17:35


   Cancel  





 


 Dextrose/Sodium


 Chloride  1,000 ml @ 


 75 mls/hr  [id number]


 IV


   3/5/21 17:10


 3/5/21 20:13 DC 3/5/21 17:32





 


 Diphenhydramine


 HCl


  (Benadryl)  12.5 mg  Q4HP  PRN


 IV


 ITCHING  3/22/21 14:40


   Cancel  





 


 Docusate Sodium


  (Colace)  100 mg  BID


 PO


   3/22/21 21:00


    3/23/21 21:52





 


 Docusate Sodium


  (Colace)  100 mg  BID


 PO


   3/5/21 21:00


 3/22/21 15:58 DC 3/20/21 08:33





 


 Fentanyl Citrate


  (Sublimaze)  25 mcg  Q5MP  PRN


 IV


 PAIN LEVEL 5-10  3/22/21 17:30


 3/22/21 18:30 DC  





 


 Fentanyl/


 Bupivacaine HCl  250 ml @ 4


 mls/hr  Q24H


 EPIDURAL


   3/22/21 14:40


    3/23/21 17:05





 


 Glucagon


  (Glucagon)  1 mg  ASDIRECTED  PRN


 SC


 SEE LABEL COMMENTS  3/5/21 17:35


   Cancel  





 


 Glucose


  (Glucose)  16 GM  ASDIRECTED  PRN


 PO


 SEE LABEL COMMENTS  3/5/21 17:35


   Cancel  





 


 Heparin Sodium


  (Heparin Lock


 Flush 10units/ml)  10 units  ASDIRECTED  PRN


 IV


 SEE LABEL COMMENTS  3/17/21 05:10


 3/22/21 15:58 DC 3/20/21 08:34





 


 Heparin Sodium


  (Porcine)


  (Heparin)  5,000 units  Q12H


 SC


   3/22/21 21:00


 3/23/21 07:45 DC  





 


 Heparin Sodium


  (Porcine)


  (Heparin)  5,000 units  Q12H


 SC


   3/5/21 21:00


    3/24/21 09:11





 


 Home Med


  (Med Rec


 Complete!)    ASDIRECTED


 XX


   3/5/21 16:15


 3/5/21 16:15 DC  





 


 Hydromorphone HCl


  (Dilaudid)  0.2 mg  Q5MP  PRN


 IV


 PAIN LEVEL 4-7  3/22/21 17:30


 3/22/21 18:30 DC  





 


 Hydroxychloroquine


 Sulfate


  (Plaquenil)  200 mg  BID


 PO


   3/5/21 21:00


    3/24/21 09:11





 


 Insulin Detemir


  (Levemir Insulin)  6 units  BID


 SC


   3/18/21 09:00


 3/22/21 08:46 DC 3/21/21 21:57





 


 Insulin Detemir


  (Levemir Insulin)  6 units  QHS


 SC


   3/8/21 21:00


 3/9/21 19:23 DC 3/8/21 20:57





 


 Insulin Detemir


  (Levemir Insulin)  8 units  QHS


 SC


   3/9/21 21:00


 3/18/21 07:46 DC 3/17/21 21:09





 


 Insulin Detemir


  (Levemir Insulin)  12 units  BID


 SC


   3/22/21 21:00


 3/24/21 05:36 DC 3/23/21 22:02





 


 Insulin Detemir


  (Levemir Insulin)  15 units  QHS


 SC


   3/24/21 21:00


     





 


 Insulin Detemir


  (Levemir Insulin)  18 units  QAM


 SC


   3/24/21 09:00


    3/24/21 09:09





 


 Insulin Human


 Lispro


  (HumaLOG INSULIN)  SEE


 PROTOCOL


 TABLE  AC


 SC


   3/6/21 07:30


    3/24/21 12:34





 


 Insulin Human


 Lispro


  (HumaLOG INSULIN)  SEE


 PROTOCOL


 TABLE  QHS


 SC


   3/5/21 21:00


    3/23/21 22:11





 


 Ketorolac


 Tromethamine


  (ToRADol)  15 mg  Q6H


 IV


   3/5/21 18:00


 3/10/21 17:59 DC 3/10/21 12:53





 


 Ketorolac


 Tromethamine


  (ToRADol)  30 mg  Q6H


 IV


   3/22/21 17:00


 3/27/21 16:59  3/24/21 12:34





 


 Levalbuterol HCl


  (Xopenex Neb)  1.25 mg  Q2HP  PRN


 NEB


 WHEEZING  3/5/21 15:25


     





 


 Levalbuterol HCl


  (Xopenex Neb)  1.25 mg  RQ6H


 NEB


   3/5/21 20:00


    3/24/21 08:03





 


 Lidocaine HCl


  (LIDOCAINE 1%


 MDV 20ml)  40 ml  STAT  STAT


 SC


   3/10/21 12:39


 3/10/21 12:41 DC 3/10/21 11:54





 


 Lidocaine HCl


  (Lmx 4/Anecream)  1 dose  Q8HP  PRN


 TOP


 PAIN  3/10/21 21:50


    3/10/21 23:45





 


 Magnesium


 Hydroxide


  (Milk Of


 Magnesia)  30 ml  DAILY


 PO


   3/23/21 09:00


     





 


 Magnesium


 Hydroxide


  (Milk Of


 Magnesia)  30 ml  DAILY


 PO


   3/6/21 09:00


 3/22/21 15:58 DC 3/10/21 09:04





 


 Metoclopramide HCl


  (REGLAN


 INJection)  10 mg  Q6HP  PRN


 IV


 NAUSEA  3/22/21 14:40


   Cancel  





 


 Midazolam HCl


  (Versed)  6 mg  ASDIRECTED  STAT


 IV


   3/10/21 12:39


 3/10/21 12:41 DC 3/10/21 11:50





 


 Midodrine


  (Proamatine)  5 mg  08,12,16


 PO


   3/19/21 13:00


    3/24/21 12:34





 


 Miscellaneous


  (Unresolved


 Clarification


 Entry)  SEE LABEL


 COMMENTS  DAILY


 XX


   3/11/21 09:00


 3/11/21 14:46 DC  





 


 Naloxone HCl


  (Narcan)  0.1 mg  Q5MP  PRN


 IV


 SEE LABEL COMMENTS  3/22/21 14:40


   Cancel  





 


 Non-Formulary


 Medication


  (Epidural/PCA


 Keys)  1 each  ASDIRECTED  PRN


 XX


 SEE LABEL COMMENTS  3/22/21 14:40


   Cancel  





 


 Non-Formulary


 Medication


  (Keys)    ASDIRECTED  PRN


 XX


 SEE LABEL COMMENTS  3/22/21 14:40


   Cancel  





 


 Ondansetron HCl


  (ZOFRAN


 INJection)  4 mg  Q4HP  PRN


 IV


 NAUSEA  3/22/21 16:00


     





 


 Ondansetron HCl


  (ZOFRAN


 INJection)  4 mg  Q4HP  PRN


 IV


 NAUSEA OR VOMITING  3/22/21 17:30


 3/22/21 18:30 DC  





 


 Ondansetron HCl


  (ZOFRAN


 INJection)  4 mg  Q4HP  PRN


 IV


 NAUSEA  3/5/21 15:25


   Cancel  





 


 Ondansetron HCl


  (ZOFRAN


 INJection)  4 mg  Q6HP  PRN


 IV


 REFRACTORY NAUSEA  3/22/21 14:40


   Cancel  





 


 Oxycodone HCl


  (Roxicodone,


 Oxyir)  5 mg  ASDIRECTED  PRN


 PO


 PAIN LEVEL 1-4  3/22/21 17:30


 3/22/21 18:30 DC  





 


 Oxycodone/


 Acetaminophen


  (Percocet 5mg/


 325mg Tablet)  1 tab  Q4H  PRN


 PO


 MODERATE PAIN (PS 5-7)  3/22/21 16:00


   Hold  





 


 Oxycodone/


 Acetaminophen


  (Percocet 5mg/


 325mg Tablet)  1 tab  Q4H  PRN


 PO


 MODERATE PAIN (PS 5-7)  3/5/21 15:25


 3/22/21 15:58 DC 3/19/21 21:59





 


 Oxycodone/


 Acetaminophen


  (Percocet 5mg/


 325mg Tablet)  2 tab  Q4H  PRN


 PO


 SEVERE PAIN (PS 8-10)  3/22/21 16:00


   Hold  





 


 Oxycodone/


 Acetaminophen


  (Percocet 5mg/


 325mg Tablet)  2 tab  Q4H  PRN


 PO


 SEVERE PAIN (PS 8-10)  3/5/21 15:25


 3/22/21 15:58 DC 3/12/21 22:59





 


 Pantoprazole


 Sodium


  (Protonix)  40 mg  DAILY


 IV


   3/23/21 09:00


    3/24/21 09:10





 


 Pantoprazole


 Sodium


  (Protonix)  40 mg  DAILY


 PO


   3/23/21 09:00


 3/23/21 07:46 DC  





 


 Pantoprazole


 Sodium


  (Protonix)  40 mg  DAILY


 PO


   3/6/21 09:00


 3/22/21 15:58 DC 3/22/21 09:18





 


 Phenylephrine HCl


 50 mg/Dextrose  500 ml @ 


 7.2 mls/hr  Q24H


 IV


   3/22/21 21:05


   Hold 3/22/21 23:24





 


 Phenylephrine HCl


 50 mg/Dextrose  500 ml @ 


 15 mls/hr  Q24H


 IV


   3/23/21 18:21


   UNV  





 


 Potassium


 Chloride/Dextrose/


 Sod Cl  1,000 ml @ 


 75 mls/hr  [id number]


 IV


   3/22/21 15:58


 3/24/21 09:26 DC 3/23/21 17:59





 


 Potassium


 Chloride/Dextrose/


 Sod Cl  1,000 ml @ 


 75 mls/hr  [id number]


 IV


   3/5/21 15:25


 3/5/21 17:11 DC  





 


 Prednisone


  (Deltasone)  60 mg  DAILY


 PO


   3/6/21 09:00


    3/24/21 09:10





 


 Sodium Chloride  1,000 ml @ 


 100 mls/hr  Q10H


 IV


   3/24/21 12:25


    3/24/21 12:35





 


 Sodium Chloride


  (Saline Lock


 Flush)  2 ml  ASDIRECTED  PRN


 IV


 SEE LABEL COMMENTS  3/15/21 08:00


     





 


 Sodium Chloride


  (Saline Lock


 Flush)  2 ml  SLF


 IV


   3/15/21 14:00


    3/24/21 05:07





 


 Sodium Chloride


  (Saline Lock


 Flush)  10 ml  ASDIRECTED  PRN


 IV


 SEE LABEL COMMENTS  3/17/21 05:10


 3/22/21 15:58 DC 3/20/21 08:35














Allergies


Coded Allergies:  


     No Known Drug Allergies (Verified  Allergy, Unknown, 3/5/21)











Jolanta Arias MD            Mar 24, 2021 05:57

## 2021-03-24 NOTE — IPN
THORACIC SURGERY PROGRESS NOTE



DATE:  03/24/2021



SUBJECTIVE:  Mr. Christine was seen this morning at bedside in the intensive care

unit (ICU).  He continues to do well and is currently off of the

Tony-Synephrine.  His blood pressure is adequate and his intravenous (IV) fluids

have similarly been discontinued.  He is eating well, passing gas and is

tolerating his chest tubes adequately as well.  He has no acute complaints at

this time and denies any chest pain, difficulty breathing, abdominal pain,

nausea, vomiting, constipation, diarrhea.  Nursing staff notes no acute events

overnight.  



OBJECTIVE:  

VITAL SIGNS:  Maximum temperature (T-max) 98.8 overnight, pulse 79, respiratory

rate of 22, blood pressure 144/66, saturating 99% on 2 liters nasal cannula.  

INTAKE AND OUTPUT:  In the last 24 hours, he has had 3317 mL of intake with

2000 of those coming from IV fluids, with 1410 mL output, 1075 for urine and

335 for his chest tube drainage.  

GENERAL:  Patient is a thin-appearing male who appears stated age, sitting

upright in bed in no acute distress, speaking in complete sentences.

HEENT:  Head is normocephalic, atraumatic.  Extraocular movements intact.  No

scleral icterus.  Trachea is midline on exam.  Mucous membranes are moist.

CARDIOVASCULAR:  Regular rate and rhythm.  Normal S1, S2.  No murmurs, gallops

or rubs.

RESPIRATORY:  He has Velcro-like crackles on the left side of the lung,

specifically the left lower lobe, with bronchophony and sounds from the chest

tube in the right lower lobe.  No adventitious breath sounds in either left or

right upper lobe.  No wheezes or rhonchi.  There is dullness to percussion on

the right compared to the left.

ABDOMEN:  Soft, nontender, nondistended.  Bowel sounds present.  No

costovertebral angle (CVA) tenderness.

EXTREMITIES:  Show no edema or swelling.

SKIN:  Warm, dry and perfusing well.

PSYCHIATRIC:  He is awake, alert and oriented times three.

NEUROLOGIC:  Cranial nerves II-XII are grossly intact.  Sensation is intact.  



LABORATORY DATA:

White blood cell count 11.8, hemoglobin 14.1, hematocrit 46.6, platelet count

173.



Sodium 139, potassium 5.2, chloride 104, bicarbonate 34, BUN 19, creatinine

0.53, glucose 260.



Lung pathology is still pending.  



IMAGING:

Chest x-ray:  The subcutaneous emphysema is essentially gone in right upper

aspects just outside of the right upper lobe.  The lung wall is now attached. 

His subclavian line remains in good position.  His right lung volume is

decreased compared to the left side with an opacity overlying the right lower

lobe. 



ASSESSMENT AND PLAN:  Mr. Christine is a 65-year-old male with a history of

recurrent pneumothoraces who is status post tap pleurodesis on 03/22/2021, seen

on postoperative day #2.



1.  Recurrent pneumothorax status post TALC pleurodesis.  He is postoperative

day #2.  He has been afebrile overnight.  He still may become febrile, which

would be expected given the procedure that he has had done.  He continues to

have adequate output from his chest tubes.  It seems like his lungs are

adhering well to the chest wall.  We will continue him at -40 mmHg suction

until Friday, at which point we will turn the chest tube off of suction and set

it to -20 mmHg and then on Saturday, may transition him to -10 mmHg.  His D5

normal saline has been discontinued as his blood pressure has been stable.  His

Tony-Synephrine has been discontinued as well.  We will keep the epidural in, in

case he requires more pain control.  Other than that, we will continue to see

how he improves moving forward.





Attending note: agree with above.  -- ROSA Hay MD

[facility]

## 2021-03-24 NOTE — REP
INDICATION:

pneumothorax.



COMPARISON:

Comparison chest x-ray March 22, 2021.



TECHNIQUE:

Two views..



FINDINGS:

There are 2 right apical pleural drainage catheters again noted.  A tiny sliver of

apical pleural air is seen decreased from the prior radiograph.  Right subclavian

central venous line terminates in the expected location of the right atrium.  Diffuse

interstitial lung disease persists unchanged.  Relatively low level of inspiration.

Monitoring electrodes are noted.  There appears to be an epidural catheter.  There is

less extrathoracic air in the soft tissues adjacent to the right chest wall today.



IMPRESSION:

Findings essentially unchanged.  Two right chest tubes remain in place at the apex..





<Electronically signed by Dmitri Adamson > 03/24/21 [id number]

## 2021-03-25 VITALS — SYSTOLIC BLOOD PRESSURE: 113 MMHG | DIASTOLIC BLOOD PRESSURE: 65 MMHG

## 2021-03-25 VITALS — DIASTOLIC BLOOD PRESSURE: 65 MMHG | SYSTOLIC BLOOD PRESSURE: 160 MMHG

## 2021-03-25 VITALS — SYSTOLIC BLOOD PRESSURE: 123 MMHG | DIASTOLIC BLOOD PRESSURE: 60 MMHG

## 2021-03-25 VITALS — SYSTOLIC BLOOD PRESSURE: 132 MMHG | DIASTOLIC BLOOD PRESSURE: 75 MMHG

## 2021-03-25 VITALS — SYSTOLIC BLOOD PRESSURE: 124 MMHG | DIASTOLIC BLOOD PRESSURE: 67 MMHG

## 2021-03-25 VITALS — SYSTOLIC BLOOD PRESSURE: 134 MMHG | DIASTOLIC BLOOD PRESSURE: 71 MMHG

## 2021-03-25 VITALS — DIASTOLIC BLOOD PRESSURE: 98 MMHG | SYSTOLIC BLOOD PRESSURE: 134 MMHG

## 2021-03-25 VITALS — SYSTOLIC BLOOD PRESSURE: 120 MMHG | DIASTOLIC BLOOD PRESSURE: 72 MMHG

## 2021-03-25 VITALS — DIASTOLIC BLOOD PRESSURE: 74 MMHG | SYSTOLIC BLOOD PRESSURE: 113 MMHG

## 2021-03-25 VITALS — DIASTOLIC BLOOD PRESSURE: 57 MMHG | SYSTOLIC BLOOD PRESSURE: 93 MMHG

## 2021-03-25 LAB
BASOPHILS # BLD AUTO: 0.1 10^3/UL (ref 0–0.2)
BASOPHILS NFR BLD AUTO: 0.6 % (ref 0–1)
BUN SERPL-MCNC: 20 MG/DL (ref 7–18)
CALCIUM SERPL-MCNC: 7.8 MG/DL (ref 8.8–10.2)
CHLORIDE SERPL-SCNC: 106 MEQ/L (ref 98–107)
CO2 SERPL-SCNC: 34 MEQ/L (ref 21–32)
CREAT SERPL-MCNC: 0.49 MG/DL (ref 0.7–1.3)
EOSINOPHIL # BLD AUTO: 0 10^3/UL (ref 0–0.5)
EOSINOPHIL NFR BLD AUTO: 0.2 % (ref 0–3)
GFR SERPL CREATININE-BSD FRML MDRD: > 60 ML/MIN/{1.73_M2} (ref 49–?)
GLUCOSE SERPL-MCNC: 217 MG/DL (ref 70–100)
HCT VFR BLD AUTO: 42 % (ref 42–52)
HGB BLD-MCNC: 12.9 G/DL (ref 13.5–17.5)
LYMPHOCYTES # BLD AUTO: 0.3 10^3/UL (ref 1.5–5)
LYMPHOCYTES NFR BLD AUTO: 3.3 % (ref 24–44)
MCH RBC QN AUTO: 29.7 PG (ref 27–33)
MCHC RBC AUTO-ENTMCNC: 30.7 G/DL (ref 32–36.5)
MCV RBC AUTO: 96.8 FL (ref 80–96)
MONOCYTES # BLD AUTO: 0.6 10^3/UL (ref 0–0.8)
MONOCYTES NFR BLD AUTO: 6 % (ref 2–8)
NEUTROPHILS # BLD AUTO: 8.7 10^3/UL (ref 1.5–8.5)
NEUTROPHILS NFR BLD AUTO: 85.9 % (ref 36–66)
PLATELET # BLD AUTO: 163 10^3/UL (ref 150–450)
POTASSIUM SERPL-SCNC: 4.5 MEQ/L (ref 3.5–5.1)
RBC # BLD AUTO: 4.34 10^6/UL (ref 4.3–6.1)
SODIUM SERPL-SCNC: 142 MEQ/L (ref 136–145)
WBC # BLD AUTO: 10.1 10^3/UL (ref 4–10)

## 2021-03-25 RX ADMIN — DOCUSATE SODIUM SCH MG: 100 CAPSULE, LIQUID FILLED ORAL at 09:00

## 2021-03-25 RX ADMIN — INSULIN DETEMIR SCH UNITS: 100 INJECTION, SOLUTION SUBCUTANEOUS at 22:33

## 2021-03-25 RX ADMIN — KETOROLAC TROMETHAMINE SCH MG: 30 INJECTION, SOLUTION INTRAMUSCULAR at 05:21

## 2021-03-25 RX ADMIN — MIDODRINE HYDROCHLORIDE SCH MG: 5 TABLET ORAL at 12:05

## 2021-03-25 RX ADMIN — HYDROXYCHLOROQUINE SULFATE SCH MG: 200 TABLET, FILM COATED ENTERAL at 10:05

## 2021-03-25 RX ADMIN — INSULIN LISPRO SCH UNITS: 100 INJECTION, SOLUTION INTRAVENOUS; SUBCUTANEOUS at 17:49

## 2021-03-25 RX ADMIN — SODIUM CHLORIDE, PRESERVATIVE FREE SCH ML: 5 INJECTION INTRAVENOUS at 05:22

## 2021-03-25 RX ADMIN — INSULIN LISPRO SCH UNITS: 100 INJECTION, SOLUTION INTRAVENOUS; SUBCUTANEOUS at 21:00

## 2021-03-25 RX ADMIN — LEVALBUTEROL HYDROCHLORIDE SCH MG: 1.25 SOLUTION, CONCENTRATE RESPIRATORY (INHALATION) at 07:36

## 2021-03-25 RX ADMIN — LEVALBUTEROL HYDROCHLORIDE SCH MG: 1.25 SOLUTION, CONCENTRATE RESPIRATORY (INHALATION) at 13:52

## 2021-03-25 RX ADMIN — DEXTROSE MONOHYDRATE SCH MG: 50 INJECTION, SOLUTION INTRAVENOUS at 10:03

## 2021-03-25 RX ADMIN — KETOROLAC TROMETHAMINE SCH MG: 30 INJECTION, SOLUTION INTRAMUSCULAR at 12:06

## 2021-03-25 RX ADMIN — MIDODRINE HYDROCHLORIDE SCH MG: 5 TABLET ORAL at 17:49

## 2021-03-25 RX ADMIN — MIDODRINE HYDROCHLORIDE SCH MG: 5 TABLET ORAL at 08:00

## 2021-03-25 RX ADMIN — LEVALBUTEROL HYDROCHLORIDE SCH MG: 1.25 SOLUTION, CONCENTRATE RESPIRATORY (INHALATION) at 20:19

## 2021-03-25 RX ADMIN — KETOROLAC TROMETHAMINE SCH MG: 30 INJECTION, SOLUTION INTRAMUSCULAR at 17:48

## 2021-03-25 RX ADMIN — KETOROLAC TROMETHAMINE SCH MG: 30 INJECTION, SOLUTION INTRAMUSCULAR at 22:32

## 2021-03-25 RX ADMIN — SODIUM CHLORIDE SCH UNITS: 4.5 INJECTION, SOLUTION INTRAVENOUS at 10:04

## 2021-03-25 RX ADMIN — ATOVAQUONE SCH MG: 750 SUSPENSION ORAL at 10:04

## 2021-03-25 RX ADMIN — SODIUM CHLORIDE, PRESERVATIVE FREE SCH ML: 5 INJECTION INTRAVENOUS at 22:34

## 2021-03-25 RX ADMIN — DOCUSATE SODIUM SCH MG: 100 CAPSULE, LIQUID FILLED ORAL at 21:00

## 2021-03-25 RX ADMIN — HYDROXYCHLOROQUINE SULFATE SCH MG: 200 TABLET, FILM COATED ENTERAL at 22:32

## 2021-03-25 RX ADMIN — INSULIN DETEMIR SCH UNITS: 100 INJECTION, SOLUTION SUBCUTANEOUS at 09:00

## 2021-03-25 RX ADMIN — SODIUM CHLORIDE SCH UNITS: 4.5 INJECTION, SOLUTION INTRAVENOUS at 22:33

## 2021-03-25 RX ADMIN — INSULIN LISPRO SCH UNITS: 100 INJECTION, SOLUTION INTRAVENOUS; SUBCUTANEOUS at 07:30

## 2021-03-25 RX ADMIN — MAGNESIUM HYDROXIDE SCH ML: 400 SUSPENSION ORAL at 09:00

## 2021-03-25 RX ADMIN — SODIUM CHLORIDE, PRESERVATIVE FREE SCH ML: 5 INJECTION INTRAVENOUS at 12:06

## 2021-03-25 RX ADMIN — INSULIN LISPRO SCH UNITS: 100 INJECTION, SOLUTION INTRAVENOUS; SUBCUTANEOUS at 12:06

## 2021-03-25 RX ADMIN — LEVALBUTEROL HYDROCHLORIDE SCH MG: 1.25 SOLUTION, CONCENTRATE RESPIRATORY (INHALATION) at 01:59

## 2021-03-25 NOTE — IPNPDOC
Date Seen


The patient was seen on 3/25/21.





Progress Note


SUBJECTIVE: 


BP remained stable off pressors, pain controlled. Stopping epidural and d/cing 

monteiro catheter today. Overall improving, denies incr chest pain, shortness of 

breath, n/v/d. 





OBJECTIVE: 





PHYSICAL EXAM: 


VITAL SIGNS: Please see below 


GENERAL: In NAD, resting in bed


LUNGS: Diminished breath sounds b/l bases, crackles mild b/l. 


CVS: S1 and S2, sinus rhythm. No murmurs, rubs or gallops.


GI:  Soft, nontender and nondistended. Positive bowel sounds.


CHEST: Right subclavian line 


BACK: epidural in place upper back, right lower back chest tubes 


EXTREMITIES: No cyanosis, clubbing or pitting edema. Left ext PICC line, left 

radial line 


: monteiro catheter 


NEURO: CN 2-12 intact, no focal deficits. 





LABORATORY DATA: Please see below 





MICROBIOLOGY: Please see below 





IMAGING: 





CXR 3/25/21: 


There is no significant interval change.





CXR 3/24/21: 


Findings essentially unchanged.  Two right chest tubes remain in place at the 

apex.





CXR 3/23/21: 


The subcutaneous emphysema along the right lateral chest wall has decreased. 

Question fracture tip of the right scapular versus superimposition artifact. No 

other interval change.





ASSESSMENT AND PLAN:  This is a 65-year-old male with a history of COPD, 

idiopathic pulmonary fibrosis, SLE managed by his rheumatologist, pulmonologist 

as an outpatient with Cytoxan planned for the future and chronic Plaquenil and 

prednisone, presented to the ER with shortness of breath, found to have 

spontaneous pneumothorax, status post chest tube on 03/05 to 03/08 with


recurrent pneumothorax, reinserted on 03/10, status post blood patch 03/17 and 

03/19/2021. Admitted for persistent right sided spontaneous pneumothorax , POD 3

talc pleurodesis  03/22/21. 





PLAN: 





  1.  Spontaneous pneumothorax on the right with recurrence, status post blood 

patch on 03/17 and 03/19, POD 3 talc pleurodesis on 03/22.


  2.  Hypotension likely multifactorial to pain medication/epidural with 

Fentanyl/bupivacaine, poss hypovolemia- resolved. 


  3.  Type 2 diabetes


  4.  History of idiopathic pulmonary fibrosis, SLE on chronic prednisone and 

Plaquenil. 


  5.  Mild hyperkalemia, acute- resolved 


  6.  Alveolar pleural fistula.


  7.  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


  8.  Thrombocytopenia, improving with negative Heparin induced thrombocytopenia

panel.


  9.  GERD


 10. COPD.





DVT px: Heparin SC 








PLAN: Saturating well on 2 L NC, no increased SOB or chest pain. BS 70's this 

AM, decreased levemir HS. Decreased o/p from chest tube overnight. Downgrading 

today to PCU.  Thoracic surgery following closely





VS, I&O, 24H, Fishbone


Vital Signs/I&O





Vital Signs








  Date Time  Temp Pulse Resp B/P (MAP) Pulse Ox O2 Delivery O2 Flow Rate FiO2


 


3/25/21 06:48  73  134/71 (94) 98   


 


3/25/21 04:01 97.4  20     


 


3/25/21 04:00       2.0 


 


3/25/21 00:01      Nasal Cannula  














I&O- Last 24 Hours up to 6 AM 


 


 3/25/21





 05:59


 


Intake Total 1820 ml


 


Output Total 1536 ml


 


Balance 284 ml











Laboratory Data


24H LABS


Laboratory Tests 2


3/24/21 12:13: Bedside Glucose (Misc Panel) 198H


3/24/21 14:49: Bedside Glucose (Misc Panel) 173H


3/24/21 17:02: Bedside Glucose (Misc Panel) 223H


3/24/21 20:33: Bedside Glucose (Misc Panel) 303H


3/25/21 03:10: 


Immature Granulocyte % (Auto) 4.0H, Neutrophils (%) (Auto) 85.9H, Lymphocytes 

(%) (Auto) 3.3L, Monocytes (%) (Auto) 6.0, Eosinophils (%) (Auto) 0.2, Basophils

(%) (Auto) 0.6, Neutrophils # (Auto) 8.7H, Lymphocytes # (Auto) 0.3L, Monocytes 

# (Auto) 0.6, Eosinophils # (Auto) 0.0, Basophils # (Auto) 0.1, Nucleated Red 

Blood Cells % (auto) 0.0, Anion Gap 2L, Glomerular Filtration Rate > 60.0, 

Calcium Level 7.8L


3/25/21 09:23: Bedside Glucose (Misc Panel) 74L


CBC/BMP


Laboratory Tests


3/25/21 03:10











Current Medications





Current Medications








 Medications


  (Trade)  Dose


 Ordered  Sig/Sadie


 Route


 PRN Reason  Start Time


 Stop Time Status Last Admin


Dose Admin


 


 Acetaminophen


  (Tylenol Tab)  650 mg  Q6HP  PRN


 PO


 T > 101.5 or HA  3/22/21 16:00


     





 


 Acetaminophen


  (Tylenol Tab)  650 mg  Q6HP  PRN


 PO


 T > 101.5 or HA  3/5/21 15:25


   Cancel  





 


 Acetaminophen/


 Hydrocodone Bitart


  (Norco, Anexsia


 5/325)  1 tab  Q3H  PRN


 PO


 MILD PAIN (PS 1-4)  3/22/21 16:00


   Hold  





 


 Acetaminophen/


 Hydrocodone Bitart


  (Norco, Anexsia


 5/325)  1 tab  Q3H  PRN


 PO


 MILD PAIN (PS 1-4)  3/5/21 15:25


 3/22/21 15:58 DC 3/15/21 20:26





 


 Atovaquone


  (Mepron 750mg/


 5ml Suspension)  1,500 mg  DAILY


 PO


   3/6/21 09:00


    3/25/21 10:04





 


 Bisacodyl


  (Dulcolax


 Suppository)  10 mg  Q4HP  PRN


 TN


 CONSTIPATION  3/22/21 16:00


     





 


 Bisacodyl


  (Dulcolax


 Suppository)  10 mg  Q4HP  PRN


 TN


 CONSTIPATION  3/5/21 15:25


   Cancel  





 


 Cefazolin Sodium


 1 gm/Dextrose  50 ml @ 


 100 mls/hr  Q8H


 IV


   3/22/21 21:00


 3/24/21 13:29 DC 3/24/21 12:38





 


 Dextrose


  (Dextrose 50%)  25 ml  ASDIRECTED  PRN


 IV


 SEE LABEL COMMENTS  3/5/21 17:35


   Cancel  





 


 Dextrose/Sodium


 Chloride  1,000 ml @ 


 75 mls/hr  [id number]


 IV


   3/5/21 17:10


 3/5/21 20:13 DC 3/5/21 17:32





 


 Diphenhydramine


 HCl


  (Benadryl)  12.5 mg  Q4HP  PRN


 IV


 ITCHING  3/22/21 14:40


   Cancel  





 


 Docusate Sodium


  (Colace)  100 mg  BID


 PO


   3/22/21 21:00


    3/24/21 20:39





 


 Docusate Sodium


  (Colace)  100 mg  BID


 PO


   3/5/21 21:00


 3/22/21 15:58 DC 3/20/21 08:33





 


 Fentanyl Citrate


  (Sublimaze)  25 mcg  Q5MP  PRN


 IV


 PAIN LEVEL 5-10  3/22/21 17:30


 3/22/21 18:30 DC  





 


 Fentanyl/


 Bupivacaine HCl  250 ml @ 4


 mls/hr  Q24H


 EPIDURAL


   3/22/21 14:40


   Hold 3/24/21 17:23





 


 Glucagon


  (Glucagon)  1 mg  ASDIRECTED  PRN


 SC


 SEE LABEL COMMENTS  3/5/21 17:35


   Cancel  





 


 Glucose


  (Glucose)  16 GM  ASDIRECTED  PRN


 PO


 SEE LABEL COMMENTS  3/5/21 17:35


   Cancel  





 


 Heparin Sodium


  (Heparin Lock


 Flush 10units/ml)  10 units  ASDIRECTED  PRN


 IV


 SEE LABEL COMMENTS  3/17/21 05:10


 3/22/21 15:58 DC 3/20/21 08:34





 


 Heparin Sodium


  (Porcine)


  (Heparin)  5,000 units  Q12H


 SC


   3/22/21 21:00


 3/23/21 07:45 DC  





 


 Heparin Sodium


  (Porcine)


  (Heparin)  5,000 units  Q12H


 SC


   3/5/21 21:00


    3/25/21 10:04





 


 Home Med


  (Med Rec


 Complete!)    ASDIRECTED


 XX


   3/5/21 16:15


 3/5/21 16:15 DC  





 


 Hydromorphone HCl


  (Dilaudid)  0.2 mg  Q5MP  PRN


 IV


 PAIN LEVEL 4-7  3/22/21 17:30


 3/22/21 18:30 DC  





 


 Hydroxychloroquine


 Sulfate


  (Plaquenil)  200 mg  BID


 PO


   3/5/21 21:00


    3/25/21 10:05





 


 Insulin Detemir


  (Levemir Insulin)  6 units  BID


 SC


   3/18/21 09:00


 3/22/21 08:46 DC 3/21/21 21:57





 


 Insulin Detemir


  (Levemir Insulin)  6 units  QHS


 SC


   3/8/21 21:00


 3/9/21 19:23 DC 3/8/21 20:57





 


 Insulin Detemir


  (Levemir Insulin)  8 units  QHS


 SC


   3/9/21 21:00


 3/18/21 07:46 DC 3/17/21 21:09





 


 Insulin Detemir


  (Levemir Insulin)  12 units  BID


 SC


   3/22/21 21:00


 3/24/21 05:36 DC 3/23/21 22:02





 


 Insulin Detemir


  (Levemir Insulin)  15 units  QHS


 SC


   3/24/21 21:00


    3/24/21 20:42





 


 Insulin Detemir


  (Levemir Insulin)  18 units  QAM


 SC


   3/24/21 09:00


    3/24/21 09:09





 


 Insulin Human


 Lispro


  (HumaLOG INSULIN)  SEE


 PROTOCOL


 TABLE  AC


 SC


   3/6/21 07:30


    3/24/21 17:20





 


 Insulin Human


 Lispro


  (HumaLOG INSULIN)  SEE


 PROTOCOL


 TABLE  QHS


 SC


   3/5/21 21:00


    3/24/21 20:41





 


 Ketorolac


 Tromethamine


  (ToRADol)  15 mg  Q6H


 IV


   3/5/21 18:00


 3/10/21 17:59 DC 3/10/21 12:53





 


 Ketorolac


 Tromethamine


  (ToRADol)  30 mg  Q6H


 IV


   3/22/21 17:00


 3/27/21 16:59  3/25/21 05:21





 


 Levalbuterol HCl


  (Xopenex Neb)  1.25 mg  Q2HP  PRN


 NEB


 WHEEZING  3/5/21 15:25


     





 


 Levalbuterol HCl


  (Xopenex Neb)  1.25 mg  RQ6H


 NEB


   3/5/21 20:00


    3/25/21 07:36





 


 Lidocaine HCl


  (LIDOCAINE 1%


 MDV 20ml)  40 ml  STAT  STAT


 SC


   3/10/21 12:39


 3/10/21 12:41 DC 3/10/21 11:54





 


 Lidocaine HCl


  (Lmx 4/Anecream)  1 dose  Q8HP  PRN


 TOP


 PAIN  3/10/21 21:50


    3/10/21 23:45





 


 Magnesium


 Hydroxide


  (Milk Of


 Magnesia)  30 ml  DAILY


 PO


   3/23/21 09:00


     





 


 Magnesium


 Hydroxide


  (Milk Of


 Magnesia)  30 ml  DAILY


 PO


   3/6/21 09:00


 3/22/21 15:58 DC 3/10/21 09:04





 


 Metoclopramide HCl


  (REGLAN


 INJection)  10 mg  Q6HP  PRN


 IV


 NAUSEA  3/22/21 14:40


   Cancel  





 


 Midazolam HCl


  (Versed)  6 mg  ASDIRECTED  STAT


 IV


   3/10/21 12:39


 3/10/21 12:41 DC 3/10/21 11:50





 


 Midodrine


  (Proamatine)  5 mg  08,12,16


 PO


   3/19/21 13:00


    3/24/21 17:20





 


 Miscellaneous


  (Unresolved


 Clarification


 Entry)  SEE LABEL


 COMMENTS  DAILY


 XX


   3/11/21 09:00


 3/11/21 14:46 DC  





 


 Naloxone HCl


  (Narcan)  0.1 mg  Q5MP  PRN


 IV


 SEE LABEL COMMENTS  3/22/21 14:40


   Cancel  





 


 Non-Formulary


 Medication


  (Epidural/PCA


 Keys)  1 each  ASDIRECTED  PRN


 XX


 SEE LABEL COMMENTS  3/22/21 14:40


   Cancel  





 


 Non-Formulary


 Medication


  (Keys)    ASDIRECTED  PRN


 XX


 SEE LABEL COMMENTS  3/22/21 14:40


   Cancel  





 


 Ondansetron HCl


  (ZOFRAN


 INJection)  4 mg  Q4HP  PRN


 IV


 NAUSEA  3/22/21 16:00


     





 


 Ondansetron HCl


  (ZOFRAN


 INJection)  4 mg  Q4HP  PRN


 IV


 NAUSEA OR VOMITING  3/22/21 17:30


 3/22/21 18:30 DC  





 


 Ondansetron HCl


  (ZOFRAN


 INJection)  4 mg  Q4HP  PRN


 IV


 NAUSEA  3/5/21 15:25


   Cancel  





 


 Ondansetron HCl


  (ZOFRAN


 INJection)  4 mg  Q6HP  PRN


 IV


 REFRACTORY NAUSEA  3/22/21 14:40


   Cancel  





 


 Oxycodone HCl


  (Roxicodone,


 Oxyir)  5 mg  ASDIRECTED  PRN


 PO


 PAIN LEVEL 1-4  3/22/21 17:30


 3/22/21 18:30 DC  





 


 Oxycodone/


 Acetaminophen


  (Percocet 5mg/


 325mg Tablet)  1 tab  Q4H  PRN


 PO


 MODERATE PAIN (PS 5-7)  3/22/21 16:00


   Hold  





 


 Oxycodone/


 Acetaminophen


  (Percocet 5mg/


 325mg Tablet)  1 tab  Q4H  PRN


 PO


 MODERATE PAIN (PS 5-7)  3/5/21 15:25


 3/22/21 15:58 DC 3/19/21 21:59





 


 Oxycodone/


 Acetaminophen


  (Percocet 5mg/


 325mg Tablet)  2 tab  Q4H  PRN


 PO


 SEVERE PAIN (PS 8-10)  3/22/21 16:00


   Hold  





 


 Oxycodone/


 Acetaminophen


  (Percocet 5mg/


 325mg Tablet)  2 tab  Q4H  PRN


 PO


 SEVERE PAIN (PS 8-10)  3/5/21 15:25


 3/22/21 15:58 DC 3/12/21 22:59





 


 Pantoprazole


 Sodium


  (Protonix)  40 mg  DAILY


 IV


   3/23/21 09:00


    3/25/21 10:03





 


 Pantoprazole


 Sodium


  (Protonix)  40 mg  DAILY


 PO


   3/23/21 09:00


 3/23/21 07:46 DC  





 


 Pantoprazole


 Sodium


  (Protonix)  40 mg  DAILY


 PO


   3/6/21 09:00


 3/22/21 15:58 DC 3/22/21 09:18





 


 Phenylephrine HCl


 50 mg/Dextrose  500 ml @ 


 7.2 mls/hr  Q24H


 IV


   3/22/21 21:05


 3/24/21 17:39 DC 3/22/21 23:24





 


 Phenylephrine HCl


 50 mg/Dextrose  500 ml @ 


 15 mls/hr  Q24H


 IV


   3/23/21 18:21


   UNV  





 


 Potassium


 Chloride/Dextrose/


 Sod Cl  1,000 ml @ 


 75 mls/hr  [id number]


 IV


   3/22/21 15:58


 3/24/21 09:26 DC 3/23/21 17:59





 


 Potassium


 Chloride/Dextrose/


 Sod Cl  1,000 ml @ 


 75 mls/hr  [id number]


 IV


   3/5/21 15:25


 3/5/21 17:11 DC  





 


 Prednisone


  (Deltasone)  60 mg  DAILY


 PO


   3/6/21 09:00


    3/25/21 10:05





 


 Sodium Chloride  1,000 ml @ 


 100 mls/hr  Q10H


 IV


   3/24/21 12:25


 3/24/21 17:37 DC 3/24/21 12:35





 


 Sodium Chloride


  (Saline Lock


 Flush)  2 ml  ASDIRECTED  PRN


 IV


 SEE LABEL COMMENTS  3/15/21 08:00


     





 


 Sodium Chloride


  (Saline Lock


 Flush)  2 ml  SLF


 IV


   3/15/21 14:00


    3/25/21 05:22





 


 Sodium Chloride


  (Saline Lock


 Flush)  10 ml  ASDIRECTED  PRN


 IV


 SEE LABEL COMMENTS  3/17/21 05:10


 3/22/21 15:58 DC 3/20/21 08:35














Allergies


Coded Allergies:  


     No Known Drug Allergies (Verified  Allergy, Unknown, 3/5/21)











Jolanta Arias MD            Mar 25, 2021 10:57

## 2021-03-25 NOTE — REP
INDICATION:

pneumothorax.



COMPARISON:

03/24/2021.



TECHNIQUE:

Upright PA and lateral chest.



FINDINGS:

The 2 right thoracotomy tubes are unchanged.

There is a tiny right apical pneumothorax.

Diffuse bilateral interstitial coarsening is again identified, unchanged.

The right subclavian central venous catheter is unchanged.

The epidural catheter is unchanged.

Cardiac size is normal.

Small volume of subcutaneous emphysema along the right lateral chest wall.





IMPRESSION:

There is no significant interval change.





<Electronically signed by Richard Junior > 03/25/21 [id number]

## 2021-03-26 VITALS — SYSTOLIC BLOOD PRESSURE: 109 MMHG | DIASTOLIC BLOOD PRESSURE: 74 MMHG

## 2021-03-26 VITALS — DIASTOLIC BLOOD PRESSURE: 56 MMHG | SYSTOLIC BLOOD PRESSURE: 112 MMHG

## 2021-03-26 VITALS — DIASTOLIC BLOOD PRESSURE: 64 MMHG | SYSTOLIC BLOOD PRESSURE: 106 MMHG

## 2021-03-26 VITALS — SYSTOLIC BLOOD PRESSURE: 125 MMHG | DIASTOLIC BLOOD PRESSURE: 81 MMHG

## 2021-03-26 VITALS — SYSTOLIC BLOOD PRESSURE: 116 MMHG | DIASTOLIC BLOOD PRESSURE: 61 MMHG

## 2021-03-26 VITALS — SYSTOLIC BLOOD PRESSURE: 112 MMHG | DIASTOLIC BLOOD PRESSURE: 78 MMHG

## 2021-03-26 LAB
BASOPHILS # BLD AUTO: 0.1 10^3/UL (ref 0–0.2)
BASOPHILS NFR BLD AUTO: 0.8 % (ref 0–1)
BUN SERPL-MCNC: 22 MG/DL (ref 7–18)
CALCIUM SERPL-MCNC: 7.8 MG/DL (ref 8.8–10.2)
CHLORIDE SERPL-SCNC: 103 MEQ/L (ref 98–107)
CO2 SERPL-SCNC: 39 MEQ/L (ref 21–32)
CREAT SERPL-MCNC: 0.43 MG/DL (ref 0.7–1.3)
EOSINOPHIL # BLD AUTO: 0.1 10^3/UL (ref 0–0.5)
EOSINOPHIL NFR BLD AUTO: 1.6 % (ref 0–3)
GFR SERPL CREATININE-BSD FRML MDRD: > 60 ML/MIN/{1.73_M2} (ref 49–?)
GLUCOSE SERPL-MCNC: 66 MG/DL (ref 70–100)
HCT VFR BLD AUTO: 40.8 % (ref 42–52)
HGB BLD-MCNC: 12.6 G/DL (ref 13.5–17.5)
LYMPHOCYTES # BLD AUTO: 1.2 10^3/UL (ref 1.5–5)
LYMPHOCYTES NFR BLD AUTO: 13.8 % (ref 24–44)
MCH RBC QN AUTO: 29.4 PG (ref 27–33)
MCHC RBC AUTO-ENTMCNC: 30.9 G/DL (ref 32–36.5)
MCV RBC AUTO: 95.1 FL (ref 80–96)
MONOCYTES # BLD AUTO: 0.6 10^3/UL (ref 0–0.8)
MONOCYTES NFR BLD AUTO: 6.3 % (ref 2–8)
NEUTROPHILS # BLD AUTO: 6.5 10^3/UL (ref 1.5–8.5)
NEUTROPHILS NFR BLD AUTO: 73.5 % (ref 36–66)
PLATELET # BLD AUTO: 170 10^3/UL (ref 150–450)
POTASSIUM SERPL-SCNC: 3.7 MEQ/L (ref 3.5–5.1)
RBC # BLD AUTO: 4.29 10^6/UL (ref 4.3–6.1)
SODIUM SERPL-SCNC: 143 MEQ/L (ref 136–145)
WBC # BLD AUTO: 8.9 10^3/UL (ref 4–10)

## 2021-03-26 RX ADMIN — MIDODRINE HYDROCHLORIDE SCH MG: 5 TABLET ORAL at 16:59

## 2021-03-26 RX ADMIN — LEVALBUTEROL HYDROCHLORIDE SCH MG: 1.25 SOLUTION, CONCENTRATE RESPIRATORY (INHALATION) at 01:47

## 2021-03-26 RX ADMIN — SODIUM CHLORIDE SCH UNITS: 4.5 INJECTION, SOLUTION INTRAVENOUS at 09:55

## 2021-03-26 RX ADMIN — HYDROXYCHLOROQUINE SULFATE SCH MG: 200 TABLET, FILM COATED ENTERAL at 09:55

## 2021-03-26 RX ADMIN — MIDODRINE HYDROCHLORIDE SCH MG: 5 TABLET ORAL at 13:42

## 2021-03-26 RX ADMIN — DOCUSATE SODIUM SCH MG: 100 CAPSULE, LIQUID FILLED ORAL at 09:00

## 2021-03-26 RX ADMIN — ATOVAQUONE SCH MG: 750 SUSPENSION ORAL at 09:56

## 2021-03-26 RX ADMIN — KETOROLAC TROMETHAMINE SCH MG: 30 INJECTION, SOLUTION INTRAMUSCULAR at 12:04

## 2021-03-26 RX ADMIN — KETOROLAC TROMETHAMINE SCH MG: 30 INJECTION, SOLUTION INTRAMUSCULAR at 23:07

## 2021-03-26 RX ADMIN — MIDODRINE HYDROCHLORIDE SCH MG: 5 TABLET ORAL at 09:55

## 2021-03-26 RX ADMIN — DOCUSATE SODIUM SCH MG: 100 CAPSULE, LIQUID FILLED ORAL at 20:53

## 2021-03-26 RX ADMIN — KETOROLAC TROMETHAMINE SCH MG: 30 INJECTION, SOLUTION INTRAMUSCULAR at 17:00

## 2021-03-26 RX ADMIN — INSULIN LISPRO SCH UNITS: 100 INJECTION, SOLUTION INTRAVENOUS; SUBCUTANEOUS at 13:42

## 2021-03-26 RX ADMIN — INSULIN DETEMIR SCH UNITS: 100 INJECTION, SOLUTION SUBCUTANEOUS at 20:52

## 2021-03-26 RX ADMIN — INSULIN LISPRO SCH UNITS: 100 INJECTION, SOLUTION INTRAVENOUS; SUBCUTANEOUS at 07:30

## 2021-03-26 RX ADMIN — INSULIN DETEMIR SCH UNITS: 100 INJECTION, SOLUTION SUBCUTANEOUS at 09:00

## 2021-03-26 RX ADMIN — SODIUM CHLORIDE, PRESERVATIVE FREE SCH ML: 5 INJECTION INTRAVENOUS at 05:43

## 2021-03-26 RX ADMIN — SODIUM CHLORIDE, PRESERVATIVE FREE SCH ML: 5 INJECTION INTRAVENOUS at 22:54

## 2021-03-26 RX ADMIN — SODIUM CHLORIDE, PRESERVATIVE FREE SCH ML: 5 INJECTION INTRAVENOUS at 14:00

## 2021-03-26 RX ADMIN — DEXTROSE MONOHYDRATE SCH MG: 50 INJECTION, SOLUTION INTRAVENOUS at 09:54

## 2021-03-26 RX ADMIN — HYDROXYCHLOROQUINE SULFATE SCH MG: 200 TABLET, FILM COATED ENTERAL at 20:53

## 2021-03-26 RX ADMIN — LEVALBUTEROL HYDROCHLORIDE SCH MG: 1.25 SOLUTION, CONCENTRATE RESPIRATORY (INHALATION) at 20:29

## 2021-03-26 RX ADMIN — INSULIN LISPRO SCH UNITS: 100 INJECTION, SOLUTION INTRAVENOUS; SUBCUTANEOUS at 20:53

## 2021-03-26 RX ADMIN — LEVALBUTEROL HYDROCHLORIDE SCH MG: 1.25 SOLUTION, CONCENTRATE RESPIRATORY (INHALATION) at 07:26

## 2021-03-26 RX ADMIN — SODIUM CHLORIDE SCH UNITS: 4.5 INJECTION, SOLUTION INTRAVENOUS at 20:54

## 2021-03-26 RX ADMIN — KETOROLAC TROMETHAMINE SCH MG: 30 INJECTION, SOLUTION INTRAMUSCULAR at 05:42

## 2021-03-26 RX ADMIN — MAGNESIUM HYDROXIDE SCH ML: 400 SUSPENSION ORAL at 09:00

## 2021-03-26 RX ADMIN — LEVALBUTEROL HYDROCHLORIDE SCH MG: 1.25 SOLUTION, CONCENTRATE RESPIRATORY (INHALATION) at 13:50

## 2021-03-26 RX ADMIN — INSULIN LISPRO SCH UNITS: 100 INJECTION, SOLUTION INTRAVENOUS; SUBCUTANEOUS at 18:02

## 2021-03-26 NOTE — REP
INDICATION:

pneumothorax.



COMPARISON:

Comparison chest x-ray March 25, 2021.



TECHNIQUE:

Two views..



FINDINGS:

Two right-sided chest tubes remain in place at the apex of the right lung.  AP barely

visible tiny sliver of pleural air is seen adjacent to this laterally.  There is a

Hsyeme-E-Qkoc catheter again noted in place via the right subclavian vein in position

of the right atrium.  Diffuse interstitial lung disease persists essentially

unchanged.  Heart remains mildly enlarged unchanged.



IMPRESSION:

Two right chest tubes remain in place.  Diffuse interstitial lung disease persists

essentially unchanged..





<Electronically signed by Dmitri Adamson > 03/26/21 [id number]

## 2021-03-26 NOTE — IPN
PROGRESS NOTE



DATE:  03/26/2021



SUBJECTIVE: No acute events overnight. The patient states he is doing well and

he is pain free despite being totally off the epidural. His blood pressure also

was intact overnight and he was not hypotensive. He continues to deny any

fever, chills, chest pain, shortness of breath, abdominal pain, nausea,

vomiting, or change in bowel habits. 



OBJECTIVE: 

VITAL SIGNS: Temperature 97.6, afebrile overnight, pulse 69 and regular,

respiratory rate of 19, blood pressure 112/78, satting 96% on 2 liters nasal

cannula. 

INTAKE AND OUTPUT: He has had 770 mL of intake in the last 24 hours with 1146

mL of output, 920 for urine and 225 mL by chest tube. 

GENERAL: Thin appearing male who appears stated age sitting upright at bedside

on PCU in no acute distress. Speaking in complete sentences. 

HEENT: Head is normocephalic, atraumatic. EOMI. No scleral icterus. Trachea is

midline. Mucous membranes are moist.

CARDIOVASCULAR: Regular rate and rhythm. Normal S1, S2. No murmurs, gallops, or

rubs. 

RESPIRATORY: Velcro-like crackles on the left side of the lung and right side

of the lung with mild bronchophony from the chest tube on the right side of the

lung. No adventitious breath sounds appreciated. No wheezes or rhonchi. No

dullness to percussion bilaterally. 

ABDOMEN: Soft, nontender, and nondistended. Bowel sounds present. No CVA

tenderness. 

EXTREMITIES: No edema or swelling. 

SKIN: Warm and dry.

PSYCHIATRIC: Awake, alert, and oriented x3 with appropriate mood and affect. 

NEUROLOGIC: Cranial nerves 2 through 12 are intact. Sensation is intact. 



LABORATORY DATA:

White blood cell count of 8.9, hemoglobin 12.6, hematocrit 40.8, platelet count

of 170,000. BMP shows sodium 143, potassium 3.7, Chloride 107, bicarb 39, BUN

22, creatinine 0.43, glucose 66, calcium 7.8. 



IMAGING DATA:

His chest x-ray continues to only show minimal displacement of the right upper

lobe of the lung from the chest wall, but not signs of subcutaneous emphysema,

no signs of acute infiltrates, and bilaterally sharp costophrenic angles. 



ASSESSMENT AND PLAN:

Mr. Christine is a 65-year-old male with a history of recurrent pneumothoraces

status post talc pleurodesis on 03/22/2021, seen postoperatively day #4. 

1.  Recurrent pneumothorax status post talc pleurodesis. He continues to

    progress quite well on postoperative day #4 with adequate chest tube

    drainage. We will be changing his suction to -20 today. On Saturday, we may

    put him to water seal with hopes to possibly take out the chest tube on

    Sunday. Depending on how he progresses with physical therapy, he may be

    ready for discharge on Monday morning. 



Dictated by Jerad Curran DO in conjunction with GABINO Kumar

## 2021-03-26 NOTE — IPN
PROGRESS NOTE



DATE:  03/25/2021



SUBJECTIVE: The patient is seen and examined in the ICU. He was having some

episodes of hypotension yesterday, so I turned down his epidural from 4 to 2

and gave him some IV fluids. However, the hypotension seemed to persist so I

turned off the epidural entirely and his blood pressure seemed to rapidly

improve after that. He is not in any pain so I think it is appropriate to

continue to leave it off entirely. Otherwise, he endorses no complaints other

than wanting to get out of the hospital as soon as possible. He denies any

fever, chills, chest pain, difficulty breathing, abdominal pain, nausea,

vomiting, constipation, or diarrhea. 



OBJECTIVE: 

VITAL SIGNS: Afebrile overnight, temperature 97.4, pulse 73 and regular, 
respiratory rate

of 20, blood pressure 134/71, satting 98% on 2 liters nasal cannula.

INTAKE AND OUTPUT: In the last 24 hours, he has had 2150 mL of intake with 1395

mL of output specifically 1155 mL of urine with 240 mL of chest tube drainage.

No air leak. 

GENERAL: The patient is a thin-appearing male who appears stated age sitting up

right in bed, in no acute distress, and able to complete sentences. 

HEENT: Head is normocephalic, atraumatic. EOMI. No scleral icterus. Trachea is

midline. Mucous membranes are moist.

CARDIOVASCULAR: Regular rate and rhythm. Normal S1 and S2. No murmurs, gallops,

or rubs. Continues to have velcro-like crackles on the left side of the lung

and mild bronchophony from the chest tube in the right side of the lung. No

adventitious breath sounds appreciated. Otherwise, no wheezes or rhonchi. There

is no dullness to percussion appreciated on the exam today.

ABDOMEN: Soft, nontender, and nondistended. Bowel sounds present. No CVA

tenderness. 

EXTREMITIES: No edema or swelling.

SKIN: Warm and dry.

NEUROLOGIC: Cranial nerves 2 through 12 are intact. Sensation is intact. 

PSYCHIATRIC: He is awake, alert, and oriented x3 with appropriate mood and

affect.



LABORATORY DATA:

White blood cell count of 10.1, hemoglobin 12.9, hematocrit 42.0, platelet

count of 163,000. Sodium 142, potassium 4.5, chloride 106, bicarb 34, BUN 20,

creatinine 0.49, glucose 217, calcium 7.8. 



Lung pathology returned and is consistent with prominently fibrous pleural bed

with evidence of recent hemorrhage from the right lower lobe wedge resection,

as well as interstitial fibrosis and calcification, consistent with clinical

history of chronic lung disease and no evidence for malignancy. 



IMAGING DATA:

Chest x-ray  The right upper lobe is minimally detached from the chest wall. No

signs of subcutaneous emphysema. Chest tubes continue to be in good position.

Trachea is minimally deviated to the right side, but less so from the day

prior. Part of this, may be due to positioning. Sharp costophrenic angles on

the left. Costophrenic angles on the right are seen. There is some haziness

throughout the lung fields. No other signs of acute infiltrate or acute

processes have developed. 



ASSESSMENT AND PLAN: 

Mr. Christine is a 65-year-old male with a history of recurrent pneumothoraces

status post talc pleurodesis on 03/22/2021, seen postoperatively day #3. 

1.  Recurrent pneumothorax status post talc pleurodesis. On postoperative day

    #3, he continues to progress well with adequate urine output. He continues

    to have adequate output from his chest tubes, and his lungs are generally

    adhering well to the chest wall. We will keep him at -40 suction until

    tomorrow and then turn his suction to -20. Then on Saturday, may transition

    him to -10. We will discontinue his epidural and his Sharif catheter as his

    blood pressure has been stable. I also informed nursing staff if his blood

    pressure remains good throughout the day with an systolic blood pressure

    (SBP) greater than 110 and not requiring any midodrine, he should be

    discontinued from that as well as it was started during his inpatient

    admission, and we do not want to carry forward any unnecessary medications. 





Dictated by Jerad Curran,  in conjunction with attending GABINO Kumar

## 2021-03-26 NOTE — IPNPDOC
Date Seen


The patient was seen on 3/26/21.





Progress Note


SUBJECTIVE: 


BP , pain controlled. No air leak suspected. CT surgery changing his suction to 

-20. Denies incr chest pain, shortness of breath, n/v/d. 





OBJECTIVE: 





PHYSICAL EXAM: 


VITAL SIGNS: Please see below 


GENERAL: In NAD, resting in bed


LUNGS: Improved breath sounds b/l, crackles mild b/l bases. 


CVS: S1 and S2, sinus rhythm. No murmurs, rubs or gallops.


GI:  Soft, nontender and nondistended. Positive bowel sounds.


CHEST: Right subclavian line 


EXTREMITIES: No cyanosis, clubbing or pitting edema. 


: monteiro catheter 


NEURO: CN 2-12 intact, no focal deficits. 





LABORATORY DATA: Please see below 





MICROBIOLOGY: Please see below 





IMAGING: 





F/u CXR today 





CXR 3/25/21: 


There is no significant interval change.





CXR 3/24/21: 


Findings essentially unchanged.  Two right chest tubes remain in place at the 

apex.





CXR 3/23/21: 


The subcutaneous emphysema along the right lateral chest wall has decreased. 

Question fracture tip of the right scapular versus superimposition artifact. No 

other interval change.





ASSESSMENT AND PLAN:  This is a 65-year-old male with a history of COPD, idi

opathic pulmonary fibrosis, SLE managed by his rheumatologist, pulmonologist as 

an outpatient with Cytoxan planned for the future and chronic Plaquenil and 

prednisone, presented to the ER with shortness of breath, found to have 

spontaneous pneumothorax, status post chest tube on 03/05 to 03/08 with


recurrent pneumothorax, reinserted on 03/10, status post blood patch 03/17 and 

03/19/2021. Admitted for persistent right sided spontaneous pneumothorax , POD 3

talc pleurodesis  03/22/21. 





PLAN: 





  1.  Spontaneous pneumothorax on the right with recurrence, status post blood 

patch on 03/17 and 03/19, POD 3 talc pleurodesis on 03/22.


  2.  COPD


  3.  Type 2 diabetes


  4.  History of idiopathic pulmonary fibrosis, SLE on chronic prednisone and 

Plaquenil. 


  5.  GERD


  6.  Alveolar pleural fistula.


  7.  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


  8.  Thrombocytopenia, improving with negative Heparin induced thrombocytopenia

panel.





DVT px: Heparin SC 





PLAN: Saturating well on 2 L NC, no increased SOB or chest pain. BS stable, 

decreased levemir HS. Decreased o/p from chest tube overnight. Hoping to switch 

to water seal this weekend for chest tube if continues to improve. Will need to 

work on stairs with PT before discharge. Likely discharge after weekend/early 

next week.  Thoracic surgery following closely





VS, I&O, 24H, Fishbone


Vital Signs/I&O





Vital Signs








  Date Time  Temp Pulse Resp B/P (MAP) Pulse Ox O2 Delivery O2 Flow Rate FiO2


 


3/26/21 12:00 97.6 85 19 106/64 (78) 97 Nasal Cannula 2.0 














I&O- Last 24 Hours up to 6 AM 


 


 3/26/21





 06:00


 


Intake Total 770 ml


 


Output Total 1405 ml


 


Balance -635 ml











Laboratory Data


24H LABS


Laboratory Tests 2


3/25/21 17:44: Bedside Glucose (Misc Panel) 390H


3/25/21 22:23: Bedside Glucose (Misc Panel) 202H


3/26/21 05:48: 


Immature Granulocyte % (Auto) 4.0H, Neutrophils (%) (Auto) 73.5H, Lymphocytes 

(%) (Auto) 13.8L, Monocytes (%) (Auto) 6.3, Eosinophils (%) (Auto) 1.6, 

Basophils (%) (Auto) 0.8, Neutrophils # (Auto) 6.5, Lymphocytes # (Auto) 1.2L, 

Monocytes # (Auto) 0.6, Eosinophils # (Auto) 0.1, Basophils # (Auto) 0.1, 

Nucleated Red Blood Cells % (auto) 0.0, Anion Gap 1L, Glomerular Filtration Rate

> 60.0, Calcium Level 7.8L


3/26/21 12:24: Bedside Glucose (Misc Panel) 113


CBC/BMP


Laboratory Tests


3/26/21 05:48











Current Medications





Current Medications








 Medications


  (Trade)  Dose


 Ordered  Sig/Sadie


 Route


 PRN Reason  Start Time


 Stop Time Status Last Admin


Dose Admin


 


 Acetaminophen


  (Tylenol Tab)  650 mg  Q6HP  PRN


 PO


 T > 101.5 or HA  3/22/21 16:00


     





 


 Acetaminophen


  (Tylenol Tab)  650 mg  Q6HP  PRN


 PO


 T > 101.5 or HA  3/5/21 15:25


   Cancel  





 


 Acetaminophen/


 Hydrocodone Bitart


  (Norco, Anexsia


 5/325)  1 tab  Q3H  PRN


 PO


 MILD PAIN (PS 1-4)  3/22/21 16:00


   Hold  





 


 Acetaminophen/


 Hydrocodone Bitart


  (Norco, Anexsia


 5/325)  1 tab  Q3H  PRN


 PO


 MILD PAIN (PS 1-4)  3/5/21 15:25


 3/22/21 15:58 DC 3/15/21 20:26





 


 Atovaquone


  (Mepron 750mg/


 5ml Suspension)  1,500 mg  DAILY


 PO


   3/6/21 09:00


    3/26/21 09:56





 


 Bisacodyl


  (Dulcolax


 Suppository)  10 mg  Q4HP  PRN


 AL


 CONSTIPATION  3/22/21 16:00


     





 


 Bisacodyl


  (Dulcolax


 Suppository)  10 mg  Q4HP  PRN


 AL


 CONSTIPATION  3/5/21 15:25


   Cancel  





 


 Cefazolin Sodium


 1 gm/Dextrose  50 ml @ 


 100 mls/hr  Q8H


 IV


   3/22/21 21:00


 3/24/21 13:29 DC 3/24/21 12:38





 


 Dextrose


  (Dextrose 50%)  25 ml  ASDIRECTED  PRN


 IV


 SEE LABEL COMMENTS  3/5/21 17:35


   Cancel  





 


 Dextrose/Sodium


 Chloride  1,000 ml @ 


 75 mls/hr  [id number]


 IV


   3/5/21 17:10


 3/5/21 20:13 DC 3/5/21 17:32





 


 Diphenhydramine


 HCl


  (Benadryl)  12.5 mg  Q4HP  PRN


 IV


 ITCHING  3/22/21 14:40


   Cancel  





 


 Docusate Sodium


  (Colace)  100 mg  BID


 PO


   3/22/21 21:00


    3/24/21 20:39





 


 Docusate Sodium


  (Colace)  100 mg  BID


 PO


   3/5/21 21:00


 3/22/21 15:58 DC 3/20/21 08:33





 


 Fentanyl Citrate


  (Sublimaze)  25 mcg  Q5MP  PRN


 IV


 PAIN LEVEL 5-10  3/22/21 17:30


 3/22/21 18:30 DC  





 


 Fentanyl/


 Bupivacaine HCl  250 ml @ 4


 mls/hr  Q24H


 EPIDURAL


   3/22/21 14:40


 3/25/21 15:56 DC 3/24/21 17:23





 


 Glucagon


  (Glucagon)  1 mg  ASDIRECTED  PRN


 SC


 SEE LABEL COMMENTS  3/5/21 17:35


   Cancel  





 


 Glucose


  (Glucose)  16 GM  ASDIRECTED  PRN


 PO


 SEE LABEL COMMENTS  3/5/21 17:35


   Cancel  





 


 Heparin Sodium


  (Heparin Lock


 Flush 10units/ml)  10 units  ASDIRECTED  PRN


 IV


 SEE LABEL COMMENTS  3/17/21 05:10


 3/22/21 15:58 DC 3/20/21 08:34





 


 Heparin Sodium


  (Porcine)


  (Heparin)  5,000 units  Q12H


 SC


   3/22/21 21:00


 3/23/21 07:45 DC  





 


 Heparin Sodium


  (Porcine)


  (Heparin)  5,000 units  Q12H


 SC


   3/5/21 21:00


    3/26/21 09:55





 


 Home Med


  (Med Rec


 Complete!)    ASDIRECTED


 XX


   3/5/21 16:15


 3/5/21 16:15 DC  





 


 Hydromorphone HCl


  (Dilaudid)  0.2 mg  Q5MP  PRN


 IV


 PAIN LEVEL 4-7  3/22/21 17:30


 3/22/21 18:30 DC  





 


 Hydroxychloroquine


 Sulfate


  (Plaquenil)  200 mg  BID


 PO


   3/5/21 21:00


    3/26/21 09:55





 


 Insulin Detemir


  (Levemir Insulin)  6 units  BID


 SC


   3/18/21 09:00


 3/22/21 08:46 DC 3/21/21 21:57





 


 Insulin Detemir


  (Levemir Insulin)  6 units  QHS


 SC


   3/8/21 21:00


 3/9/21 19:23 DC 3/8/21 20:57





 


 Insulin Detemir


  (Levemir Insulin)  8 units  QHS


 SC


   3/25/21 21:00


    3/25/21 22:33





 


 Insulin Detemir


  (Levemir Insulin)  8 units  QHS


 SC


   3/9/21 21:00


 3/18/21 07:46 DC 3/17/21 21:09





 


 Insulin Detemir


  (Levemir Insulin)  12 units  BID


 SC


   3/22/21 21:00


 3/24/21 05:36 DC 3/23/21 22:02





 


 Insulin Detemir


  (Levemir Insulin)  15 units  QHS


 SC


   3/24/21 21:00


 3/25/21 10:46 DC 3/24/21 20:42





 


 Insulin Detemir


  (Levemir Insulin)  18 units  QAM


 SC


   3/24/21 09:00


    3/24/21 09:09





 


 Insulin Human


 Lispro


  (HumaLOG INSULIN)  SEE


 PROTOCOL


 TABLE  AC


 SC


   3/6/21 07:30


    3/26/21 13:42





 


 Insulin Human


 Lispro


  (HumaLOG INSULIN)  SEE


 PROTOCOL


 TABLE  QHS


 SC


   3/5/21 21:00


    3/24/21 20:41





 


 Ketorolac


 Tromethamine


  (ToRADol)  15 mg  Q6H


 IV


   3/5/21 18:00


 3/10/21 17:59 DC 3/10/21 12:53





 


 Ketorolac


 Tromethamine


  (ToRADol)  30 mg  Q6H


 IV


   3/22/21 17:00


 3/27/21 16:59  3/26/21 12:04





 


 Levalbuterol HCl


  (Xopenex Neb)  1.25 mg  Q2HP  PRN


 NEB


 WHEEZING  3/5/21 15:25


     





 


 Levalbuterol HCl


  (Xopenex Neb)  1.25 mg  RQ6H


 NEB


   3/5/21 20:00


    3/26/21 13:50





 


 Lidocaine HCl


  (LIDOCAINE 1%


 MDV 20ml)  40 ml  STAT  STAT


 SC


   3/10/21 12:39


 3/10/21 12:41 DC 3/10/21 11:54





 


 Lidocaine HCl


  (Lmx 4/Anecream)  1 dose  Q8HP  PRN


 TOP


 PAIN  3/10/21 21:50


    3/10/21 23:45





 


 Magnesium


 Hydroxide


  (Milk Of


 Magnesia)  30 ml  DAILY


 PO


   3/23/21 09:00


     





 


 Magnesium


 Hydroxide


  (Milk Of


 Magnesia)  30 ml  DAILY


 PO


   3/6/21 09:00


 3/22/21 15:58 DC 3/10/21 09:04





 


 Metoclopramide HCl


  (REGLAN


 INJection)  10 mg  Q6HP  PRN


 IV


 NAUSEA  3/22/21 14:40


   Cancel  





 


 Midazolam HCl


  (Versed)  6 mg  ASDIRECTED  STAT


 IV


   3/10/21 12:39


 3/10/21 12:41 DC 3/10/21 11:50





 


 Midodrine


  (Proamatine)  5 mg  08,12,16


 PO


   3/19/21 13:00


    3/26/21 13:42





 


 Miscellaneous


  (Unresolved


 Clarification


 Entry)  SEE LABEL


 COMMENTS  DAILY


 XX


   3/11/21 09:00


 3/11/21 14:46 DC  





 


 Naloxone HCl


  (Narcan)  0.1 mg  Q5MP  PRN


 IV


 SEE LABEL COMMENTS  3/22/21 14:40


   Cancel  





 


 Non-Formulary


 Medication


  (Epidural/PCA


 Keys)  1 each  ASDIRECTED  PRN


 XX


 SEE LABEL COMMENTS  3/22/21 14:40


   Cancel  





 


 Non-Formulary


 Medication


  (Keys)    ASDIRECTED  PRN


 XX


 SEE LABEL COMMENTS  3/22/21 14:40


   Cancel  





 


 Ondansetron HCl


  (ZOFRAN


 INJection)  4 mg  Q4HP  PRN


 IV


 NAUSEA  3/22/21 16:00


     





 


 Ondansetron HCl


  (ZOFRAN


 INJection)  4 mg  Q4HP  PRN


 IV


 NAUSEA OR VOMITING  3/22/21 17:30


 3/22/21 18:30 DC  





 


 Ondansetron HCl


  (ZOFRAN


 INJection)  4 mg  Q4HP  PRN


 IV


 NAUSEA  3/5/21 15:25


   Cancel  





 


 Ondansetron HCl


  (ZOFRAN


 INJection)  4 mg  Q6HP  PRN


 IV


 REFRACTORY NAUSEA  3/22/21 14:40


   Cancel  





 


 Oxycodone HCl


  (Roxicodone,


 Oxyir)  5 mg  ASDIRECTED  PRN


 PO


 PAIN LEVEL 1-4  3/22/21 17:30


 3/22/21 18:30 DC  





 


 Oxycodone/


 Acetaminophen


  (Percocet 5mg/


 325mg Tablet)  1 tab  Q4H  PRN


 PO


 MODERATE PAIN (PS 5-7)  3/22/21 16:00


   Hold  





 


 Oxycodone/


 Acetaminophen


  (Percocet 5mg/


 325mg Tablet)  1 tab  Q4H  PRN


 PO


 MODERATE PAIN (PS 5-7)  3/5/21 15:25


 3/22/21 15:58 DC 3/19/21 21:59





 


 Oxycodone/


 Acetaminophen


  (Percocet 5mg/


 325mg Tablet)  2 tab  Q4H  PRN


 PO


 SEVERE PAIN (PS 8-10)  3/22/21 16:00


   Hold  





 


 Oxycodone/


 Acetaminophen


  (Percocet 5mg/


 325mg Tablet)  2 tab  Q4H  PRN


 PO


 SEVERE PAIN (PS 8-10)  3/5/21 15:25


 3/22/21 15:58 DC 3/12/21 22:59





 


 Pantoprazole


 Sodium


  (Protonix)  40 mg  DAILY


 IV


   3/23/21 09:00


    3/26/21 09:54





 


 Pantoprazole


 Sodium


  (Protonix)  40 mg  DAILY


 PO


   3/23/21 09:00


 3/23/21 07:46 DC  





 


 Pantoprazole


 Sodium


  (Protonix)  40 mg  DAILY


 PO


   3/6/21 09:00


 3/22/21 15:58 DC 3/22/21 09:18





 


 Phenylephrine HCl


 50 mg/Dextrose  500 ml @ 


 7.2 mls/hr  Q24H


 IV


   3/22/21 21:05


 3/24/21 17:39 DC 3/22/21 23:24





 


 Phenylephrine HCl


 50 mg/Dextrose  500 ml @ 


 15 mls/hr  Q24H


 IV


   3/23/21 18:21


   UNV  





 


 Potassium


 Chloride/Dextrose/


 Sod Cl  1,000 ml @ 


 75 mls/hr  [id number]


 IV


   3/22/21 15:58


 3/24/21 09:26 DC 3/23/21 17:59





 


 Potassium


 Chloride/Dextrose/


 Sod Cl  1,000 ml @ 


 75 mls/hr  [id number]


 IV


   3/5/21 15:25


 3/5/21 17:11 DC  





 


 Prednisone


  (Deltasone)  60 mg  DAILY


 PO


   3/6/21 09:00


    3/26/21 09:56





 


 Sodium Chloride  1,000 ml @ 


 100 mls/hr  Q10H


 IV


   3/24/21 12:25


 3/24/21 17:37 DC 3/24/21 12:35





 


 Sodium Chloride


  (Saline Lock


 Flush)  2 ml  ASDIRECTED  PRN


 IV


 SEE LABEL COMMENTS  3/15/21 08:00


     





 


 Sodium Chloride


  (Saline Lock


 Flush)  2 ml  SLF


 IV


   3/15/21 14:00


    3/26/21 05:43





 


 Sodium Chloride


  (Saline Lock


 Flush)  10 ml  ASDIRECTED  PRN


 IV


 SEE LABEL COMMENTS  3/17/21 05:10


 3/22/21 15:58 DC 3/20/21 08:35














Allergies


Coded Allergies:  


     No Known Drug Allergies (Verified  Allergy, Unknown, 3/5/21)











Jolanta Arias MD            Mar 26, 2021 14:38

## 2021-03-27 VITALS — SYSTOLIC BLOOD PRESSURE: 110 MMHG | DIASTOLIC BLOOD PRESSURE: 67 MMHG

## 2021-03-27 VITALS — SYSTOLIC BLOOD PRESSURE: 98 MMHG | DIASTOLIC BLOOD PRESSURE: 60 MMHG

## 2021-03-27 VITALS — DIASTOLIC BLOOD PRESSURE: 71 MMHG | SYSTOLIC BLOOD PRESSURE: 109 MMHG

## 2021-03-27 VITALS — SYSTOLIC BLOOD PRESSURE: 117 MMHG | DIASTOLIC BLOOD PRESSURE: 75 MMHG

## 2021-03-27 VITALS — SYSTOLIC BLOOD PRESSURE: 138 MMHG | DIASTOLIC BLOOD PRESSURE: 70 MMHG

## 2021-03-27 VITALS — DIASTOLIC BLOOD PRESSURE: 76 MMHG | SYSTOLIC BLOOD PRESSURE: 119 MMHG

## 2021-03-27 LAB
ANISOCYTOSIS BLD QL SMEAR: (no result)
BUN SERPL-MCNC: 22 MG/DL (ref 7–18)
CALCIUM SERPL-MCNC: 7.6 MG/DL (ref 8.8–10.2)
CHLORIDE SERPL-SCNC: 102 MEQ/L (ref 98–107)
CO2 SERPL-SCNC: 37 MEQ/L (ref 21–32)
CREAT SERPL-MCNC: 0.35 MG/DL (ref 0.7–1.3)
EOSINOPHIL NFR BLD MANUAL: 3 % (ref 0–3)
GFR SERPL CREATININE-BSD FRML MDRD: > 60 ML/MIN/{1.73_M2} (ref 49–?)
GLUCOSE SERPL-MCNC: 76 MG/DL (ref 70–100)
HCT VFR BLD AUTO: 35.3 % (ref 42–52)
HGB BLD-MCNC: 11 G/DL (ref 13.5–17.5)
LYMPHOCYTES NFR BLD MANUAL: 26 % (ref 16–44)
MCH RBC QN AUTO: 29.6 PG (ref 27–33)
MCHC RBC AUTO-ENTMCNC: 31.2 G/DL (ref 32–36.5)
MCV RBC AUTO: 95.1 FL (ref 80–96)
MONOCYTES NFR BLD MANUAL: 3 % (ref 0–5)
NEUTROPHILS NFR BLD MANUAL: 64 % (ref 28–66)
PLATELET # BLD AUTO: 144 10^3/UL (ref 150–450)
PLATELET BLD QL SMEAR: NORMAL
POTASSIUM SERPL-SCNC: 3.6 MEQ/L (ref 3.5–5.1)
RBC # BLD AUTO: 3.71 10^6/UL (ref 4.3–6.1)
SODIUM SERPL-SCNC: 142 MEQ/L (ref 136–145)
VARIANT LYMPHS NFR BLD MANUAL: 4 % (ref 0–5)
WBC # BLD AUTO: 7.6 10^3/UL (ref 4–10)

## 2021-03-27 RX ADMIN — INSULIN LISPRO SCH UNITS: 100 INJECTION, SOLUTION INTRAVENOUS; SUBCUTANEOUS at 11:34

## 2021-03-27 RX ADMIN — Medication SCH UNITS: at 21:40

## 2021-03-27 RX ADMIN — SODIUM CHLORIDE, PRESERVATIVE FREE SCH ML: 5 INJECTION INTRAVENOUS at 05:11

## 2021-03-27 RX ADMIN — INSULIN LISPRO SCH UNITS: 100 INJECTION, SOLUTION INTRAVENOUS; SUBCUTANEOUS at 16:45

## 2021-03-27 RX ADMIN — KETOROLAC TROMETHAMINE SCH MG: 30 INJECTION, SOLUTION INTRAMUSCULAR at 05:10

## 2021-03-27 RX ADMIN — KETOROLAC TROMETHAMINE SCH MG: 30 INJECTION, SOLUTION INTRAMUSCULAR at 11:33

## 2021-03-27 RX ADMIN — MIDODRINE HYDROCHLORIDE SCH MG: 5 TABLET ORAL at 08:54

## 2021-03-27 RX ADMIN — HYDROXYCHLOROQUINE SULFATE SCH MG: 200 TABLET, FILM COATED ENTERAL at 08:54

## 2021-03-27 RX ADMIN — LEVALBUTEROL HYDROCHLORIDE SCH MG: 1.25 SOLUTION, CONCENTRATE RESPIRATORY (INHALATION) at 01:24

## 2021-03-27 RX ADMIN — SODIUM CHLORIDE, PRESERVATIVE FREE SCH ML: 5 INJECTION INTRAVENOUS at 14:43

## 2021-03-27 RX ADMIN — LEVALBUTEROL HYDROCHLORIDE SCH MG: 1.25 SOLUTION, CONCENTRATE RESPIRATORY (INHALATION) at 08:00

## 2021-03-27 RX ADMIN — DOCUSATE SODIUM SCH MG: 100 CAPSULE, LIQUID FILLED ORAL at 21:36

## 2021-03-27 RX ADMIN — INSULIN DETEMIR SCH UNITS: 100 INJECTION, SOLUTION SUBCUTANEOUS at 08:46

## 2021-03-27 RX ADMIN — SODIUM CHLORIDE, PRESERVATIVE FREE SCH ML: 5 INJECTION INTRAVENOUS at 21:41

## 2021-03-27 RX ADMIN — MAGNESIUM HYDROXIDE SCH ML: 400 SUSPENSION ORAL at 08:54

## 2021-03-27 RX ADMIN — INSULIN DETEMIR SCH UNITS: 100 INJECTION, SOLUTION SUBCUTANEOUS at 21:39

## 2021-03-27 RX ADMIN — SODIUM CHLORIDE SCH UNITS: 4.5 INJECTION, SOLUTION INTRAVENOUS at 21:39

## 2021-03-27 RX ADMIN — HYDROXYCHLOROQUINE SULFATE SCH MG: 200 TABLET, FILM COATED ENTERAL at 21:36

## 2021-03-27 RX ADMIN — INSULIN LISPRO SCH UNITS: 100 INJECTION, SOLUTION INTRAVENOUS; SUBCUTANEOUS at 07:30

## 2021-03-27 RX ADMIN — DEXTROSE MONOHYDRATE SCH MG: 50 INJECTION, SOLUTION INTRAVENOUS at 08:54

## 2021-03-27 RX ADMIN — INSULIN LISPRO SCH UNITS: 100 INJECTION, SOLUTION INTRAVENOUS; SUBCUTANEOUS at 21:39

## 2021-03-27 RX ADMIN — LEVALBUTEROL HYDROCHLORIDE SCH MG: 1.25 SOLUTION, CONCENTRATE RESPIRATORY (INHALATION) at 20:33

## 2021-03-27 RX ADMIN — DOCUSATE SODIUM SCH MG: 100 CAPSULE, LIQUID FILLED ORAL at 08:54

## 2021-03-27 RX ADMIN — ATOVAQUONE SCH MG: 750 SUSPENSION ORAL at 08:55

## 2021-03-27 RX ADMIN — MIDODRINE HYDROCHLORIDE SCH MG: 5 TABLET ORAL at 11:33

## 2021-03-27 RX ADMIN — LEVALBUTEROL HYDROCHLORIDE SCH MG: 1.25 SOLUTION, CONCENTRATE RESPIRATORY (INHALATION) at 14:31

## 2021-03-27 RX ADMIN — SODIUM CHLORIDE SCH UNITS: 4.5 INJECTION, SOLUTION INTRAVENOUS at 08:54

## 2021-03-27 RX ADMIN — SODIUM CHLORIDE SCH ML: 9 INJECTION, SOLUTION INTRAMUSCULAR; INTRAVENOUS; SUBCUTANEOUS at 21:41

## 2021-03-27 RX ADMIN — MIDODRINE HYDROCHLORIDE SCH MG: 5 TABLET ORAL at 16:45

## 2021-03-27 NOTE — IPN
THORACIC SURGERY PROGRESS NOTE



DATE:  03/27/2021



SUBJECTIVE:  No acute events overnight.  Patient continues to deny any fever,

chills, chest pain, shortness of breath, abdominal pain, nausea, vomiting,

change in bowel habits.  He continues to tolerate a regular diet.  



OBJECTIVE:  

VITAL SIGNS:  Afebrile overnight.  Temperature 97.4, pulse 66 and regular,

respiratory rate 20, blood pressure 98/60, pulse oximetry 98% on 2 liters nasal

cannula.  

INTAKE AND OUTPUT:  In 1500 mL, out 1895 with 150 mL of chest tube drainage and

no air leak, but with large amounts of fluid moving up and down with variations

in respiration.



PHYSICAL EXAMINATION:

GENERAL:  Patient is a thin-appearing male who appears stated age, sitting

upright at bedside eating breakfast, in no acute distress, speaking in complete

sentences.

HEENT:  Head is normocephalic, atraumatic.   Extraocular movements intact. 

Scleral icterus clear.  Mucous membranes moist.  Trachea is midline.

CARDIOVASCULAR:  Regular rate and rhythm.  Normal S1, S2.  No murmurs, gallops

or rubs.

RESPIRATORY:  Ongoing Velcro-like crackles on bilateral lung fields.  Reduced

bronchophony, but still some on the right-sided chest tube.  Otherwise, no

adventitious breath sounds are appreciated.  No wheezes or rhonchi.  No

dullness to percussion bilaterally.

ABDOMEN:  Soft, nontender, nondistended.  No costovertebral angle (CVA)

tenderness.  

EXTREMITIES:  Exhibit no edema or swelling.

SKIN:  Warm, dry, perfusing well.

NEUROLOGIC:  Cranial nerves II-XII are grossly intact.  Sensation is intact

throughout.  Gait is stable.

PSYCHIATRIC:  Awake, alert, oriented times three with appropriate mood and

affect.  



LABORATORY DATA:

White blood cell count 7.6, hemoglobin 11.0, hematocrit 35.3, platelet count

144.



BMP showing sodium 142, potassium 3.6, chloride 102, bicarbonate 37, BUN 22,

creatinine 0.35, glucose 76, calcium 7.6.



IMAGING:

Two-view chest x-ray showing no subcutaneous emphysema in the right upper lobe.

 It does seem as though there is minimal displacement of the lung from the

chest wall in the right upper lobe as compared to prior images.  Otherwise, no

acute infiltrates.  Heart border is regular size.  Trachea is maybe slightly

less deviated to the right than prior images.  Costophrenic angles are sharp.  



ASSESSMENT AND PLAN:  Patient is a 65-year-old male with history of recurrent

pneumothoraces with history of TALC pleurodesis on 03/20/21 seen

postoperatively day #5.



Recurrent pneumothoraces status post TALC pleurodesis.  He continues to have

less and less output from his chest tube every day.  There is still no ongoing

air leak despite turning town the suction to -20.  Today, we will disconnect

suction entirely and repeat a chest x-ray at 3:00 p.m., as he does seem to have

some detachment of the lung from the chest wall.  This is probably complicated

from his prednisone and hydroxychloroquine for his pulmonary fibrosis.  In the

event that the repeat chest x-ray continues to show ongoing detachment from the

lung wall, we will reattach it to suction and wait a couple of more days.  If

it continues to progress, we may consider taking out the chest tube tomorrow

morning.  Patient continues to work well with physical therapy.  Please

continue to encourage this.  



GME ATTESTATION.

LENORA

## 2021-03-27 NOTE — REP
INDICATION:

checking attachment of lung to chest wall in RUL.



COMPARISON:

Comparison chest x-ray is from 7:55 a.m. film on this date 03/27/2021.



TECHNIQUE:

PA and lateral views time stamp 3:25 p.m....



FINDINGS:

Two right-sided chest tubes remain in place at the apex.  There is a small right

apical pneumothorax again seen unchanged.  There may be a small quantity of pleural

air at the right base as well lateral to the chest tube. A right-sided central venous

catheter is terminating in the expected location of superior vena cava.  Diffuse

interstitial lung disease again noted.  Cardiomediastinal silhouette is unchanged.

Monitoring electrodes are again seen.



IMPRESSION:

Small right apical pneumothorax persists essentially unchanged from the earlier study.

Two right chest tubes in place.  The there may be a small quantity of air at the right

base as well..





<Electronically signed by Dmitri Adamson > 03/27/21 [id number]

## 2021-03-27 NOTE — REP
INDICATION:

pneumothorax.



COMPARISON:

Comparison chest x-ray March 26, 2021.



TECHNIQUE:

Two views..



FINDINGS:

Two right-sided chest tubes are noted in place.  Right subclavian central venous

catheter terminates in the right atrium region.  There is a small collection of apical

pleural air on the right adjacent to the chest tubes.  Diffuse interstitial lung

disease persists.  No new area of consolidation is seen.  Cardiomegaly persists

unchanged.



IMPRESSION:

Diffuse interstitial lung disease again noted.  Two right apical chest tubes.  No

significant change from the previous day's study..





<Electronically signed by Dmitri Adamson > 03/27/21 [id number]

## 2021-03-27 NOTE — IPNPDOC
Date Seen


The patient was seen on 3/27/21.





Progress Note


SUBJECTIVE: 


Disconnecting chest tube today, repeat CXR at 3 PM. Denies incr chest pain, 

shortness of breath, n/v/d. 





OBJECTIVE: 





PHYSICAL EXAM: 


VITAL SIGNS: Please see below 


GENERAL: In NAD, resting in bed


LUNGS: Improved breath sounds b/l, crackles mild b/l bases. 


CVS: S1 and S2, sinus rhythm. No murmurs, rubs or gallops.


GI:  Soft, nontender and nondistended. Positive bowel sounds.


CHEST: Right subclavian line 


EXTREMITIES: No cyanosis, clubbing or pitting edema. 


: monteiro catheter 


NEURO: CN 2-12 intact, no focal deficits. 





LABORATORY DATA: Please see below 





MICROBIOLOGY: Please see below 





IMAGING: 





F/u CXR today 





CXR 3/25/21: 


There is no significant interval change.





CXR 3/24/21: 


Findings essentially unchanged.  Two right chest tubes remain in place at the 

apex.





CXR 3/23/21: 


The subcutaneous emphysema along the right lateral chest wall has decreased. 

Question fracture tip of the right scapular versus superimposition artifact. No 

other interval change.





ASSESSMENT AND PLAN:  This is a 65-year-old male with a history of COPD, 

idiopathic pulmonary fibrosis, SLE managed by his rheumatologist, pulmonologist 

as an outpatient with Cytoxan planned for the future and chronic Plaquenil and 

prednisone, presented to the ER with shortness of breath, found to have spont

aneous pneumothorax, status post chest tube on 03/05 to 03/08 with


recurrent pneumothorax, reinserted on 03/10, status post blood patch 03/17 and 

03/19/2021. Admitted for persistent right sided spontaneous pneumothorax , POD 3

talc pleurodesis  03/22/21. 





PLAN: 





  1.  Spontaneous pneumothorax on the right with recurrence, status post blood 

patch on 03/17 and 03/19, POD 3 talc pleurodesis on 03/22.


  2.  COPD


  3.  Type 2 diabetes


  4.  History of idiopathic pulmonary fibrosis, SLE on chronic prednisone and 

Plaquenil. 


  5.  GERD


  6.  Alveolar pleural fistula.


  7.  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


  8.  Thrombocytopenia, improving with negative Heparin induced thrombocytopenia

panel.





DVT px: Heparin SC 





PLAN: Saturating well on 2 L NC, will see how he does with disconnecting chest 

tube today. F/u CXR 3 PM. If does well goal is to take out chest tube 3/28/21. B

S still elevated, increased levemir AM.  Thoracic surgery following closely





VS, I&O, 24H, [place]


Vital Signs/I&O





Vital Signs








  Date Time  Temp Pulse Resp B/P (MAP) Pulse Ox O2 Delivery O2 Flow Rate FiO2


 


3/27/21 16:00 98.1 95 24 110/67 (81) 94 Nasal Cannula 2.0 














I&O- Last 24 Hours up to 6 AM 


 


 3/27/21





 06:00


 


Intake Total 1740 ml


 


Output Total 1255 ml


 


Balance 485 ml











Laboratory Data


24H LABS


Laboratory Tests 2


3/26/21 17:30: Bedside Glucose (Misc Panel) 331H


3/26/21 20:37: Bedside Glucose (Misc Panel) 341H


3/27/21 05:12: 


Immature Granulocyte % (Auto) , Neutrophils (%) (Auto) , Nucleated Red Blood C

ells % (auto) 0.0, Neutrophils 64, Lymphocytes (Manual) 26, Monocytes (Manual) 

3, Eosinophils (Manual) 3, Atypical Lymphocytes 4, Anisocytosis 1+, Platelet 

Estimate NORMAL, Anion Gap 3L, Glomerular Filtration Rate > 60.0, Calcium Level 

7.6L


3/27/21 11:24: Bedside Glucose (Misc Panel) 134H


3/27/21 16:14: Bedside Glucose (Misc Panel) 401H


CBC/BMP


Laboratory Tests


3/27/21 05:12











Current Medications





Current Medications








 Medications


  (Trade)  Dose


 Ordered  Sig/Sadie


 Route


 PRN Reason  Start Time


 Stop Time Status Last Admin


Dose Admin


 


 Acetaminophen


  (Tylenol Tab)  650 mg  Q6HP  PRN


 PO


 T > 101.5 or HA  3/22/21 16:00


     





 


 Acetaminophen


  (Tylenol Tab)  650 mg  Q6HP  PRN


 PO


 T > 101.5 or HA  3/5/21 15:25


   Cancel  





 


 Acetaminophen/


 Hydrocodone Bitart


  (Norco, Anexsia


 5/325)  1 tab  Q3H  PRN


 PO


 MILD PAIN (PS 1-4)  3/22/21 16:00


   Hold  





 


 Acetaminophen/


 Hydrocodone Bitart


  (Norco, Anexsia


 5/325)  1 tab  Q3H  PRN


 PO


 MILD PAIN (PS 1-4)  3/5/21 15:25


 3/22/21 15:58 DC 3/15/21 20:26





 


 Atovaquone


  (Mepron 750mg/


 5ml Suspension)  1,500 mg  DAILY


 PO


   3/6/21 09:00


    3/27/21 08:55





 


 Bisacodyl


  (Dulcolax


 Suppository)  10 mg  Q4HP  PRN


 UT


 CONSTIPATION  3/22/21 16:00


     





 


 Bisacodyl


  (Dulcolax


 Suppository)  10 mg  Q4HP  PRN


 UT


 CONSTIPATION  3/5/21 15:25


   Cancel  





 


 Cefazolin Sodium


 1 gm/Dextrose  50 ml @ 


 100 mls/hr  Q8H


 IV


   3/22/21 21:00


 3/24/21 13:29 DC 3/24/21 12:38





 


 Dextrose


  (Dextrose 50%)  25 ml  ASDIRECTED  PRN


 IV


 SEE LABEL COMMENTS  3/5/21 17:35


   Cancel  





 


 Dextrose/Sodium


 Chloride  1,000 ml @ 


 75 mls/hr  [id number]


 IV


   3/5/21 17:10


 3/5/21 20:13 DC 3/5/21 17:32





 


 Diphenhydramine


 HCl


  (Benadryl)  12.5 mg  Q4HP  PRN


 IV


 ITCHING  3/22/21 14:40


   Cancel  





 


 Docusate Sodium


  (Colace)  100 mg  BID


 PO


   3/22/21 21:00


    3/27/21 08:54





 


 Docusate Sodium


  (Colace)  100 mg  BID


 PO


   3/5/21 21:00


 3/22/21 15:58 DC 3/20/21 08:33





 


 Fentanyl Citrate


  (Sublimaze)  25 mcg  Q5MP  PRN


 IV


 PAIN LEVEL 5-10  3/22/21 17:30


 3/22/21 18:30 DC  





 


 Fentanyl/


 Bupivacaine HCl  250 ml @ 4


 mls/hr  Q24H


 EPIDURAL


   3/22/21 14:40


 3/25/21 15:56 DC 3/24/21 17:23





 


 Glucagon


  (Glucagon)  1 mg  ASDIRECTED  PRN


 SC


 SEE LABEL COMMENTS  3/5/21 17:35


   Cancel  





 


 Glucose


  (Glucose)  16 GM  ASDIRECTED  PRN


 PO


 SEE LABEL COMMENTS  3/5/21 17:35


   Cancel  





 


 Heparin Sodium


  (Heparin Lock


 Flush 10units/ml)  10 units  ASDIRECTED  PRN


 IV


 SEE LABEL COMMENTS  3/17/21 05:10


 3/22/21 15:58 DC 3/20/21 08:34





 


 Heparin Sodium


  (Heparin Lock


 Flush 10units/ml)  10 units  ASDIRECTED  PRN


 IV


 SEE LABEL COMMENTS  3/27/21 16:30


     





 


 Heparin Sodium


  (Heparin Lock


 Flush 10units/ml)  10 units  HLF


 IV


   3/27/21 22:00


     





 


 Heparin Sodium


  (Porcine)


  (Heparin)  5,000 units  Q12H


 SC


   3/22/21 21:00


 3/23/21 07:45 DC  





 


 Heparin Sodium


  (Porcine)


  (Heparin)  5,000 units  Q12H


 SC


   3/5/21 21:00


    3/27/21 08:54





 


 Home Med


  (Med Rec


 Complete!)    ASDIRECTED


 XX


   3/5/21 16:15


 3/5/21 16:15 DC  





 


 Hydromorphone HCl


  (Dilaudid)  0.2 mg  Q5MP  PRN


 IV


 PAIN LEVEL 4-7  3/22/21 17:30


 3/22/21 18:30 DC  





 


 Hydroxychloroquine


 Sulfate


  (Plaquenil)  200 mg  BID


 PO


   3/5/21 21:00


    3/27/21 08:54





 


 Insulin Detemir


  (Levemir Insulin)  6 units  BID


 SC


   3/18/21 09:00


 3/22/21 08:46 DC 3/21/21 21:57





 


 Insulin Detemir


  (Levemir Insulin)  6 units  QHS


 SC


   3/8/21 21:00


 3/9/21 19:23 DC 3/8/21 20:57





 


 Insulin Detemir


  (Levemir Insulin)  8 units  QHS


 SC


   3/25/21 21:00


    3/26/21 20:52





 


 Insulin Detemir


  (Levemir Insulin)  8 units  QHS


 SC


   3/9/21 21:00


 3/18/21 07:46 DC 3/17/21 21:09





 


 Insulin Detemir


  (Levemir Insulin)  12 units  BID


 SC


   3/22/21 21:00


 3/24/21 05:36 DC 3/23/21 22:02





 


 Insulin Detemir


  (Levemir Insulin)  15 units  QHS


 SC


   3/24/21 21:00


 3/25/21 10:46 DC 3/24/21 20:42





 


 Insulin Detemir


  (Levemir Insulin)  18 units  QAM


 SC


   3/24/21 09:00


 3/27/21 08:01 DC 3/24/21 09:09





 


 Insulin Detemir


  (Levemir Insulin)  25 units  QAM


 SC


   3/27/21 09:00


     





 


 Insulin Human


 Lispro


  (HumaLOG INSULIN)  SEE


 PROTOCOL


 TABLE  AC


 SC


   3/6/21 07:30


    3/27/21 16:45





 


 Insulin Human


 Lispro


  (HumaLOG INSULIN)  SEE


 PROTOCOL


 TABLE  QHS


 SC


   3/5/21 21:00


    3/26/21 20:53





 


 Ketorolac


 Tromethamine


  (ToRADol)  15 mg  Q6H


 IV


   3/5/21 18:00


 3/10/21 17:59 DC 3/10/21 12:53





 


 Ketorolac


 Tromethamine


  (ToRADol)  30 mg  Q6H


 IV


   3/22/21 17:00


 3/27/21 16:59 DC 3/27/21 11:33





 


 Levalbuterol HCl


  (Xopenex Neb)  1.25 mg  Q2HP  PRN


 NEB


 WHEEZING  3/5/21 15:25


     





 


 Levalbuterol HCl


  (Xopenex Neb)  1.25 mg  RQ6H


 NEB


   3/5/21 20:00


    3/27/21 14:31





 


 Lidocaine HCl


  (LIDOCAINE 1%


 MDV 20ml)  40 ml  STAT  STAT


 SC


   3/10/21 12:39


 3/10/21 12:41 DC 3/10/21 11:54





 


 Lidocaine HCl


  (Lmx 4/Anecream)  1 dose  Q8HP  PRN


 TOP


 PAIN  3/10/21 21:50


    3/10/21 23:45





 


 Magnesium


 Hydroxide


  (Milk Of


 Magnesia)  30 ml  DAILY


 PO


   3/23/21 09:00


     





 


 Magnesium


 Hydroxide


  (Milk Of


 Magnesia)  30 ml  DAILY


 PO


   3/6/21 09:00


 3/22/21 15:58 DC 3/10/21 09:04





 


 Metoclopramide HCl


  (REGLAN


 INJection)  10 mg  Q6HP  PRN


 IV


 NAUSEA  3/22/21 14:40


   Cancel  





 


 Midazolam HCl


  (Versed)  6 mg  ASDIRECTED  STAT


 IV


   3/10/21 12:39


 3/10/21 12:41 DC 3/10/21 11:50





 


 Midodrine


  (Proamatine)  5 mg  08,12,16


 PO


   3/19/21 13:00


    3/27/21 16:45





 


 Miscellaneous


  (Unresolved


 Clarification


 Entry)  SEE LABEL


 COMMENTS  DAILY


 XX


   3/11/21 09:00


 3/11/21 14:46 DC  





 


 Naloxone HCl


  (Narcan)  0.1 mg  Q5MP  PRN


 IV


 SEE LABEL COMMENTS  3/22/21 14:40


   Cancel  





 


 Non-Formulary


 Medication


  (Epidural/PCA


 Keys)  1 each  ASDIRECTED  PRN


 XX


 SEE LABEL COMMENTS  3/22/21 14:40


   Cancel  





 


 Non-Formulary


 Medication


  (Keys)    ASDIRECTED  PRN


 XX


 SEE LABEL COMMENTS  3/22/21 14:40


   Cancel  





 


 Ondansetron HCl


  (ZOFRAN


 INJection)  4 mg  Q4HP  PRN


 IV


 NAUSEA  3/22/21 16:00


     





 


 Ondansetron HCl


  (ZOFRAN


 INJection)  4 mg  Q4HP  PRN


 IV


 NAUSEA OR VOMITING  3/22/21 17:30


 3/22/21 18:30 DC  





 


 Ondansetron HCl


  (ZOFRAN


 INJection)  4 mg  Q4HP  PRN


 IV


 NAUSEA  3/5/21 15:25


   Cancel  





 


 Ondansetron HCl


  (ZOFRAN


 INJection)  4 mg  Q6HP  PRN


 IV


 REFRACTORY NAUSEA  3/22/21 14:40


   Cancel  





 


 Oxycodone HCl


  (Roxicodone,


 Oxyir)  5 mg  ASDIRECTED  PRN


 PO


 PAIN LEVEL 1-4  3/22/21 17:30


 3/22/21 18:30 DC  





 


 Oxycodone/


 Acetaminophen


  (Percocet 5mg/


 325mg Tablet)  1 tab  Q4H  PRN


 PO


 MODERATE PAIN (PS 5-7)  3/22/21 16:00


   Hold  





 


 Oxycodone/


 Acetaminophen


  (Percocet 5mg/


 325mg Tablet)  1 tab  Q4H  PRN


 PO


 MODERATE PAIN (PS 5-7)  3/5/21 15:25


 3/22/21 15:58 DC 3/19/21 21:59





 


 Oxycodone/


 Acetaminophen


  (Percocet 5mg/


 325mg Tablet)  2 tab  Q4H  PRN


 PO


 SEVERE PAIN (PS 8-10)  3/22/21 16:00


   Hold  





 


 Oxycodone/


 Acetaminophen


  (Percocet 5mg/


 325mg Tablet)  2 tab  Q4H  PRN


 PO


 SEVERE PAIN (PS 8-10)  3/5/21 15:25


 3/22/21 15:58 DC 3/12/21 22:59





 


 Pantoprazole


 Sodium


  (Protonix)  40 mg  DAILY


 IV


   3/23/21 09:00


    3/27/21 08:54





 


 Pantoprazole


 Sodium


  (Protonix)  40 mg  DAILY


 PO


   3/23/21 09:00


 3/23/21 07:46 DC  





 


 Pantoprazole


 Sodium


  (Protonix)  40 mg  DAILY


 PO


   3/6/21 09:00


 3/22/21 15:58 DC 3/22/21 09:18





 


 Phenylephrine HCl


 50 mg/Dextrose  500 ml @ 


 7.2 mls/hr  Q24H


 IV


   3/22/21 21:05


 3/24/21 17:39 DC 3/22/21 23:24





 


 Phenylephrine HCl


 50 mg/Dextrose  500 ml @ 


 15 mls/hr  Q24H


 IV


   3/23/21 18:21


   UNV  





 


 Potassium


 Chloride/Dextrose/


 Sod Cl  1,000 ml @ 


 75 mls/hr  [id number]


 IV


   3/22/21 15:58


 3/24/21 09:26 DC 3/23/21 17:59





 


 Potassium


 Chloride/Dextrose/


 Sod Cl  1,000 ml @ 


 75 mls/hr  [id number]


 IV


   3/5/21 15:25


 3/5/21 17:11 DC  





 


 Prednisone


  (Deltasone)  60 mg  DAILY


 PO


   3/6/21 09:00


    3/27/21 08:54





 


 Sodium Chloride  1,000 ml @ 


 100 mls/hr  Q10H


 IV


   3/24/21 12:25


 3/24/21 17:37 DC 3/24/21 12:35





 


 Sodium Chloride


  (Saline Lock


 Flush)  2 ml  ASDIRECTED  PRN


 IV


 SEE LABEL COMMENTS  3/15/21 08:00


     





 


 Sodium Chloride


  (Saline Lock


 Flush)  2 ml  SLF


 IV


   3/15/21 14:00


    3/27/21 14:43





 


 Sodium Chloride


  (Saline Lock


 Flush)  10 ml  ASDIRECTED  PRN


 IV


 SEE LABEL COMMENTS  3/17/21 05:10


 3/22/21 15:58 DC 3/20/21 08:35





 


 Sodium Chloride


  (Saline Lock


 Flush)  10 ml  ASDIRECTED  PRN


 IV


 SEE LABEL COMMENTS  3/27/21 16:30


     





 


 Sodium Chloride


  (Saline Lock


 Flush)  10 ml  SLF


 IV


   3/27/21 22:00


     














Allergies


Coded Allergies:  


     No Known Drug Allergies (Verified  Allergy, Unknown, 3/5/21)











Jolanta Arias MD            Mar 27, 2021 17:28

## 2021-03-28 VITALS — SYSTOLIC BLOOD PRESSURE: 133 MMHG | DIASTOLIC BLOOD PRESSURE: 73 MMHG

## 2021-03-28 VITALS — DIASTOLIC BLOOD PRESSURE: 73 MMHG | SYSTOLIC BLOOD PRESSURE: 123 MMHG

## 2021-03-28 VITALS — SYSTOLIC BLOOD PRESSURE: 119 MMHG | DIASTOLIC BLOOD PRESSURE: 70 MMHG

## 2021-03-28 VITALS — SYSTOLIC BLOOD PRESSURE: 113 MMHG | DIASTOLIC BLOOD PRESSURE: 79 MMHG

## 2021-03-28 VITALS — DIASTOLIC BLOOD PRESSURE: 71 MMHG | SYSTOLIC BLOOD PRESSURE: 141 MMHG

## 2021-03-28 LAB
ANISOCYTOSIS BLD QL SMEAR: (no result)
BUN SERPL-MCNC: 18 MG/DL (ref 7–18)
CALCIUM SERPL-MCNC: 7.9 MG/DL (ref 8.8–10.2)
CHLORIDE SERPL-SCNC: 100 MEQ/L (ref 98–107)
CO2 SERPL-SCNC: 38 MEQ/L (ref 21–32)
CREAT SERPL-MCNC: 0.38 MG/DL (ref 0.7–1.3)
EOSINOPHIL NFR BLD MANUAL: 2 % (ref 0–3)
GFR SERPL CREATININE-BSD FRML MDRD: > 60 ML/MIN/{1.73_M2} (ref 49–?)
GLUCOSE SERPL-MCNC: 91 MG/DL (ref 70–100)
HCT VFR BLD AUTO: 41.9 % (ref 42–52)
HGB BLD-MCNC: 12.9 G/DL (ref 13.5–17.5)
LYMPHOCYTES NFR BLD MANUAL: 11 % (ref 16–44)
MCH RBC QN AUTO: 28.9 PG (ref 27–33)
MCHC RBC AUTO-ENTMCNC: 30.8 G/DL (ref 32–36.5)
MCV RBC AUTO: 93.7 FL (ref 80–96)
MONOCYTES NFR BLD MANUAL: 5 % (ref 0–5)
NEUTROPHILS NFR BLD MANUAL: 75 % (ref 28–66)
PLATELET # BLD AUTO: 182 10^3/UL (ref 150–450)
PLATELET BLD QL SMEAR: NORMAL
POTASSIUM SERPL-SCNC: 3.8 MEQ/L (ref 3.5–5.1)
RBC # BLD AUTO: 4.47 10^6/UL (ref 4.3–6.1)
SODIUM SERPL-SCNC: 139 MEQ/L (ref 136–145)
VARIANT LYMPHS NFR BLD MANUAL: 7 % (ref 0–5)
WBC # BLD AUTO: 9.8 10^3/UL (ref 4–10)

## 2021-03-28 RX ADMIN — SODIUM CHLORIDE SCH ML: 9 INJECTION, SOLUTION INTRAMUSCULAR; INTRAVENOUS; SUBCUTANEOUS at 14:09

## 2021-03-28 RX ADMIN — Medication SCH UNITS: at 14:09

## 2021-03-28 RX ADMIN — MIDODRINE HYDROCHLORIDE SCH MG: 5 TABLET ORAL at 08:00

## 2021-03-28 RX ADMIN — SODIUM CHLORIDE SCH UNITS: 4.5 INJECTION, SOLUTION INTRAVENOUS at 20:01

## 2021-03-28 RX ADMIN — DOCUSATE SODIUM SCH MG: 100 CAPSULE, LIQUID FILLED ORAL at 20:01

## 2021-03-28 RX ADMIN — INSULIN LISPRO SCH UNITS: 100 INJECTION, SOLUTION INTRAVENOUS; SUBCUTANEOUS at 07:30

## 2021-03-28 RX ADMIN — INSULIN DETEMIR SCH UNITS: 100 INJECTION, SOLUTION SUBCUTANEOUS at 20:00

## 2021-03-28 RX ADMIN — Medication SCH UNITS: at 22:53

## 2021-03-28 RX ADMIN — INSULIN LISPRO SCH UNITS: 100 INJECTION, SOLUTION INTRAVENOUS; SUBCUTANEOUS at 17:06

## 2021-03-28 RX ADMIN — INSULIN LISPRO SCH UNITS: 100 INJECTION, SOLUTION INTRAVENOUS; SUBCUTANEOUS at 12:19

## 2021-03-28 RX ADMIN — SODIUM CHLORIDE, PRESERVATIVE FREE SCH ML: 5 INJECTION INTRAVENOUS at 05:44

## 2021-03-28 RX ADMIN — LEVALBUTEROL HYDROCHLORIDE SCH MG: 1.25 SOLUTION, CONCENTRATE RESPIRATORY (INHALATION) at 15:05

## 2021-03-28 RX ADMIN — Medication SCH UNITS: at 05:44

## 2021-03-28 RX ADMIN — HYDROXYCHLOROQUINE SULFATE SCH MG: 200 TABLET, FILM COATED ENTERAL at 09:14

## 2021-03-28 RX ADMIN — DOCUSATE SODIUM SCH MG: 100 CAPSULE, LIQUID FILLED ORAL at 08:58

## 2021-03-28 RX ADMIN — SODIUM CHLORIDE, PRESERVATIVE FREE SCH ML: 5 INJECTION INTRAVENOUS at 22:00

## 2021-03-28 RX ADMIN — SODIUM CHLORIDE, PRESERVATIVE FREE SCH ML: 5 INJECTION INTRAVENOUS at 14:00

## 2021-03-28 RX ADMIN — INSULIN DETEMIR SCH UNITS: 100 INJECTION, SOLUTION SUBCUTANEOUS at 08:59

## 2021-03-28 RX ADMIN — LEVALBUTEROL HYDROCHLORIDE SCH MG: 1.25 SOLUTION, CONCENTRATE RESPIRATORY (INHALATION) at 07:47

## 2021-03-28 RX ADMIN — MIDODRINE HYDROCHLORIDE SCH MG: 5 TABLET ORAL at 12:00

## 2021-03-28 RX ADMIN — SODIUM CHLORIDE SCH UNITS: 4.5 INJECTION, SOLUTION INTRAVENOUS at 09:13

## 2021-03-28 RX ADMIN — HYDROCODONE BITARTRATE AND ACETAMINOPHEN PRN TAB: 5; 325 TABLET ORAL at 21:20

## 2021-03-28 RX ADMIN — SODIUM CHLORIDE SCH ML: 9 INJECTION, SOLUTION INTRAMUSCULAR; INTRAVENOUS; SUBCUTANEOUS at 22:54

## 2021-03-28 RX ADMIN — INSULIN LISPRO SCH UNITS: 100 INJECTION, SOLUTION INTRAVENOUS; SUBCUTANEOUS at 20:01

## 2021-03-28 RX ADMIN — LEVALBUTEROL HYDROCHLORIDE SCH MG: 1.25 SOLUTION, CONCENTRATE RESPIRATORY (INHALATION) at 20:56

## 2021-03-28 RX ADMIN — DEXTROSE MONOHYDRATE SCH MG: 50 INJECTION, SOLUTION INTRAVENOUS at 09:14

## 2021-03-28 RX ADMIN — MIDODRINE HYDROCHLORIDE SCH MG: 5 TABLET ORAL at 16:00

## 2021-03-28 RX ADMIN — LEVALBUTEROL HYDROCHLORIDE SCH MG: 1.25 SOLUTION, CONCENTRATE RESPIRATORY (INHALATION) at 01:58

## 2021-03-28 RX ADMIN — ATOVAQUONE SCH MG: 750 SUSPENSION ORAL at 09:14

## 2021-03-28 RX ADMIN — MAGNESIUM HYDROXIDE SCH ML: 400 SUSPENSION ORAL at 08:58

## 2021-03-28 RX ADMIN — SODIUM CHLORIDE SCH ML: 9 INJECTION, SOLUTION INTRAMUSCULAR; INTRAVENOUS; SUBCUTANEOUS at 05:45

## 2021-03-28 RX ADMIN — HYDROXYCHLOROQUINE SULFATE SCH MG: 200 TABLET, FILM COATED ENTERAL at 20:01

## 2021-03-28 NOTE — IPNPDOC
Date Seen


The patient was seen on 3/28/21.





Progress Note


SUBJECTIVE: 


Chest tube at -40 pressure, plan is to keep at current pressure today. Denies 

incr chest pain, shortness of breath, n/v/d. 





OBJECTIVE: 





PHYSICAL EXAM: 


VITAL SIGNS: Please see below 


GENERAL: In NAD, resting in bed


LUNGS: Improved breath sounds b/l, crackles mild b/l bases. 


CVS: S1 and S2, sinus rhythm. No murmurs, rubs or gallops.


GI:  Soft, nontender and nondistended. Positive bowel sounds.


CHEST: Right subclavian line 


EXTREMITIES: No cyanosis, clubbing or pitting edema. 


: monteiro catheter 


NEURO: CN 2-12 intact, no focal deficits. 





LABORATORY DATA: Please see below 





MICROBIOLOGY: Please see below 





IMAGING: 





F/u CXR today 





CXR 3/25/21: 


There is no significant interval change.





CXR 3/24/21: 


Findings essentially unchanged.  Two right chest tubes remain in place at the 

apex.





CXR 3/23/21: 


The subcutaneous emphysema along the right lateral chest wall has decreased. 

Question fracture tip of the right scapular versus superimposition artifact. No 

other interval change.





ASSESSMENT AND PLAN:  This is a 65-year-old male with a history of COPD, 

idiopathic pulmonary fibrosis, SLE managed by his rheumatologist, pulmonologist 

as an outpatient with Cytoxan planned for the future and chronic Plaquenil and 

prednisone, presented to the ER with shortness of breath, found to have 

spontaneous pneumothorax, status post chest tube on 03/05 to 03/08 with


recurrent pneumothorax, reinserted on 03/10, status post blood patch 03/17 and 

03/19/2021. Admitted for persistent right sided spontaneous pneumothorax , POD 4

talc pleurodesis  03/22/21. 





PLAN: 





  1.  Spontaneous pneumothorax on the right with recurrence, status post blood 

patch on 03/17 and 03/19, POD 4 talc pleurodesis on 03/22.


  2.  COPD


  3.  Type 2 diabetes


  4.  History of idiopathic pulmonary fibrosis, SLE on chronic prednisone and 

Plaquenil. 


  5.  GERD


  6.  Alveolar pleural fistula.


  7.  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


  8.  Thrombocytopenia, improving with negative Heparin induced thrombocytopenia

panel.





DVT px: Heparin SC 





PLAN: Attempted to leave chest tube to water seal; however, had to place back on

-40 pressure unfortunately. Will continue with current treatment. Thoracic 

surgery following closely





VS, I&O, 24H, Fishbone


Vital Signs/I&O





Vital Signs








  Date Time  Temp Pulse Resp B/P (MAP) Pulse Ox O2 Delivery O2 Flow Rate FiO2


 


3/28/21 12:49   20   Nasal Cannula  


 


3/28/21 12:00       2.0 


 


3/28/21 12:00 97.4 74  119/70 (86) 96   














I&O- Last 24 Hours up to 6 AM 


 


 3/28/21





 06:00


 


Intake Total 870 ml


 


Output Total 385 ml


 


Balance 485 ml











Laboratory Data


24H LABS


Laboratory Tests 2


3/27/21 16:14: Bedside Glucose (Misc Panel) 401H


3/27/21 21:23: Bedside Glucose (Misc Panel) 298H


3/27/21 21:24: Bedside Glucose (Misc Panel) 297H


3/28/21 05:49: 


Immature Granulocyte % (Auto) , Neutrophils (%) (Auto) , Nucleated Red Blood 

Cells % (auto) 0.0, Neutrophils 75H, Lymphocytes (Manual) 11L, Monocytes 

(Manual) 5, Eosinophils (Manual) 2, Metamyelocytes , Myelocytes , Atypical 

Lymphocytes 7H, Anisocytosis 1+, Platelet Estimate NORMAL, Anion Gap 1L, 

Glomerular Filtration Rate > 60.0, Calcium Level 7.9L


3/28/21 11:57: Bedside Glucose (Misc Panel) 135H


CBC/BMP


Laboratory Tests


3/28/21 05:49











Current Medications





Current Medications








 Medications


  (Trade)  Dose


 Ordered  Sig/Sadie


 Route


 PRN Reason  Start Time


 Stop Time Status Last Admin


Dose Admin


 


 Acetaminophen


  (Tylenol Tab)  650 mg  Q6HP  PRN


 PO


 T > 101.5 or HA  3/22/21 16:00


     





 


 Acetaminophen


  (Tylenol Tab)  650 mg  Q6HP  PRN


 PO


 T > 101.5 or HA  3/5/21 15:25


   Cancel  





 


 Acetaminophen/


 Hydrocodone Bitart


  (Norco, Anexsia


 5/325)  1 tab  Q3H  PRN


 PO


 MILD PAIN (PS 1-4)  3/22/21 16:00


     





 


 Acetaminophen/


 Hydrocodone Bitart


  (Norco, Anexsia


 5/325)  1 tab  Q3H  PRN


 PO


 MILD PAIN (PS 1-4)  3/5/21 15:25


 3/22/21 15:58 DC 3/15/21 20:26





 


 Atovaquone


  (Mepron 750mg/


 5ml Suspension)  1,500 mg  DAILY


 PO


   3/6/21 09:00


    3/28/21 09:14





 


 Bisacodyl


  (Dulcolax


 Suppository)  10 mg  Q4HP  PRN


 KS


 CONSTIPATION  3/22/21 16:00


     





 


 Bisacodyl


  (Dulcolax


 Suppository)  10 mg  Q4HP  PRN


 KS


 CONSTIPATION  3/5/21 15:25


   Cancel  





 


 Cefazolin Sodium


 1 gm/Dextrose  50 ml @ 


 100 mls/hr  Q8H


 IV


   3/22/21 21:00


 3/24/21 13:29 DC 3/24/21 12:38





 


 Dextrose


  (Dextrose 50%)  25 ml  ASDIRECTED  PRN


 IV


 SEE LABEL COMMENTS  3/5/21 17:35


   Cancel  





 


 Dextrose/Sodium


 Chloride  1,000 ml @ 


 75 mls/hr  [id number]


 IV


   3/5/21 17:10


 3/5/21 20:13 DC 3/5/21 17:32





 


 Diphenhydramine


 HCl


  (Benadryl)  12.5 mg  Q4HP  PRN


 IV


 ITCHING  3/22/21 14:40


   Cancel  





 


 Docusate Sodium


  (Colace)  100 mg  BID


 PO


   3/22/21 21:00


    3/27/21 21:36





 


 Docusate Sodium


  (Colace)  100 mg  BID


 PO


   3/5/21 21:00


 3/22/21 15:58 DC 3/20/21 08:33





 


 Fentanyl Citrate


  (Sublimaze)  25 mcg  Q5MP  PRN


 IV


 PAIN LEVEL 5-10  3/22/21 17:30


 3/22/21 18:30 DC  





 


 Fentanyl/


 Bupivacaine HCl  250 ml @ 4


 mls/hr  Q24H


 EPIDURAL


   3/22/21 14:40


 3/25/21 15:56 DC 3/24/21 17:23





 


 Glucagon


  (Glucagon)  1 mg  ASDIRECTED  PRN


 SC


 SEE LABEL COMMENTS  3/5/21 17:35


   Cancel  





 


 Glucose


  (Glucose)  16 GM  ASDIRECTED  PRN


 PO


 SEE LABEL COMMENTS  3/5/21 17:35


   Cancel  





 


 Heparin Sodium


  (Heparin Lock


 Flush 10units/ml)  10 units  ASDIRECTED  PRN


 IV


 SEE LABEL COMMENTS  3/17/21 05:10


 3/22/21 15:58 DC 3/20/21 08:34





 


 Heparin Sodium


  (Heparin Lock


 Flush 10units/ml)  10 units  ASDIRECTED  PRN


 IV


 SEE LABEL COMMENTS  3/27/21 16:30


     





 


 Heparin Sodium


  (Heparin Lock


 Flush 10units/ml)  10 units  HLF


 IV


   3/27/21 22:00


    3/28/21 14:09





 


 Heparin Sodium


  (Porcine)


  (Heparin)  5,000 units  Q12H


 SC


   3/22/21 21:00


 3/23/21 07:45 DC  





 


 Heparin Sodium


  (Porcine)


  (Heparin)  5,000 units  Q12H


 SC


   3/5/21 21:00


    3/28/21 09:13





 


 Home Med


  (Med Rec


 Complete!)    ASDIRECTED


 XX


   3/5/21 16:15


 3/5/21 16:15 DC  





 


 Hydromorphone HCl


  (Dilaudid)  0.2 mg  Q5MP  PRN


 IV


 PAIN LEVEL 4-7  3/22/21 17:30


 3/22/21 18:30 DC  





 


 Hydroxychloroquine


 Sulfate


  (Plaquenil)  200 mg  BID


 PO


   3/5/21 21:00


    3/28/21 09:14





 


 Insulin Detemir


  (Levemir Insulin)  6 units  BID


 SC


   3/18/21 09:00


 3/22/21 08:46 DC 3/21/21 21:57





 


 Insulin Detemir


  (Levemir Insulin)  6 units  QHS


 SC


   3/8/21 21:00


 3/9/21 19:23 DC 3/8/21 20:57





 


 Insulin Detemir


  (Levemir Insulin)  8 units  QHS


 SC


   3/25/21 21:00


    3/27/21 21:39





 


 Insulin Detemir


  (Levemir Insulin)  8 units  QHS


 SC


   3/9/21 21:00


 3/18/21 07:46 DC 3/17/21 21:09





 


 Insulin Detemir


  (Levemir Insulin)  12 units  BID


 SC


   3/22/21 21:00


 3/24/21 05:36 DC 3/23/21 22:02





 


 Insulin Detemir


  (Levemir Insulin)  15 units  QHS


 SC


   3/24/21 21:00


 3/25/21 10:46 DC 3/24/21 20:42





 


 Insulin Detemir


  (Levemir Insulin)  18 units  QAM


 SC


   3/24/21 09:00


 3/27/21 08:01 DC 3/24/21 09:09





 


 Insulin Detemir


  (Levemir Insulin)  25 units  QAM


 SC


   3/27/21 09:00


     





 


 Insulin Human


 Lispro


  (HumaLOG INSULIN)  SEE


 PROTOCOL


 TABLE  AC


 SC


   3/6/21 07:30


    3/28/21 12:19





 


 Insulin Human


 Lispro


  (HumaLOG INSULIN)  SEE


 PROTOCOL


 TABLE  QHS


 SC


   3/5/21 21:00


    3/27/21 21:39





 


 Ketorolac


 Tromethamine


  (ToRADol)  15 mg  Q6H


 IV


   3/5/21 18:00


 3/10/21 17:59 DC 3/10/21 12:53





 


 Ketorolac


 Tromethamine


  (ToRADol)  30 mg  Q6H


 IV


   3/22/21 17:00


 3/27/21 16:59 DC 3/27/21 11:33





 


 Levalbuterol HCl


  (Xopenex Neb)  1.25 mg  Q2HP  PRN


 NEB


 WHEEZING  3/5/21 15:25


     





 


 Levalbuterol HCl


  (Xopenex Neb)  1.25 mg  RQ6H


 NEB


   3/5/21 20:00


    3/28/21 15:05





 


 Lidocaine HCl


  (LIDOCAINE 1%


 MDV 20ml)  40 ml  STAT  STAT


 SC


   3/10/21 12:39


 3/10/21 12:41 DC 3/10/21 11:54





 


 Lidocaine HCl


  (Lmx 4/Anecream)  1 dose  Q8HP  PRN


 TOP


 PAIN  3/10/21 21:50


    3/10/21 23:45





 


 Magnesium


 Hydroxide


  (Milk Of


 Magnesia)  30 ml  DAILY


 PO


   3/23/21 09:00


     





 


 Magnesium


 Hydroxide


  (Milk Of


 Magnesia)  30 ml  DAILY


 PO


   3/6/21 09:00


 3/22/21 15:58 DC 3/10/21 09:04





 


 Metoclopramide HCl


  (REGLAN


 INJection)  10 mg  Q6HP  PRN


 IV


 NAUSEA  3/22/21 14:40


   Cancel  





 


 Midazolam HCl


  (Versed)  6 mg  ASDIRECTED  STAT


 IV


   3/10/21 12:39


 3/10/21 12:41 DC 3/10/21 11:50





 


 Midodrine


  (Proamatine)  5 mg  08,12,16


 PO


   3/19/21 13:00


    3/27/21 16:45





 


 Miscellaneous


  (Unresolved


 Clarification


 Entry)  SEE LABEL


 COMMENTS  DAILY


 XX


   3/11/21 09:00


 3/11/21 14:46 DC  





 


 Naloxone HCl


  (Narcan)  0.1 mg  Q5MP  PRN


 IV


 SEE LABEL COMMENTS  3/22/21 14:40


   Cancel  





 


 Non-Formulary


 Medication


  (Epidural/PCA


 Keys)  1 each  ASDIRECTED  PRN


 XX


 SEE LABEL COMMENTS  3/22/21 14:40


   Cancel  





 


 Non-Formulary


 Medication


  (Keys)    ASDIRECTED  PRN


 XX


 SEE LABEL COMMENTS  3/22/21 14:40


   Cancel  





 


 Ondansetron HCl


  (ZOFRAN


 INJection)  4 mg  Q4HP  PRN


 IV


 NAUSEA  3/22/21 16:00


     





 


 Ondansetron HCl


  (ZOFRAN


 INJection)  4 mg  Q4HP  PRN


 IV


 NAUSEA OR VOMITING  3/22/21 17:30


 3/22/21 18:30 DC  





 


 Ondansetron HCl


  (ZOFRAN


 INJection)  4 mg  Q4HP  PRN


 IV


 NAUSEA  3/5/21 15:25


   Cancel  





 


 Ondansetron HCl


  (ZOFRAN


 INJection)  4 mg  Q6HP  PRN


 IV


 REFRACTORY NAUSEA  3/22/21 14:40


   Cancel  





 


 Oxycodone HCl


  (Roxicodone,


 Oxyir)  5 mg  ASDIRECTED  PRN


 PO


 PAIN LEVEL 1-4  3/22/21 17:30


 3/22/21 18:30 DC  





 


 Oxycodone/


 Acetaminophen


  (Percocet 5mg/


 325mg Tablet)  1 tab  Q4H  PRN


 PO


 MODERATE PAIN (PS 5-7)  3/22/21 16:00


    3/28/21 12:19





 


 Oxycodone/


 Acetaminophen


  (Percocet 5mg/


 325mg Tablet)  1 tab  Q4H  PRN


 PO


 MODERATE PAIN (PS 5-7)  3/5/21 15:25


 3/22/21 15:58 DC 3/19/21 21:59





 


 Oxycodone/


 Acetaminophen


  (Percocet 5mg/


 325mg Tablet)  2 tab  Q4H  PRN


 PO


 SEVERE PAIN (PS 8-10)  3/22/21 16:00


     





 


 Oxycodone/


 Acetaminophen


  (Percocet 5mg/


 325mg Tablet)  2 tab  Q4H  PRN


 PO


 SEVERE PAIN (PS 8-10)  3/5/21 15:25


 3/22/21 15:58 DC 3/12/21 22:59





 


 Pantoprazole


 Sodium


  (Protonix)  40 mg  DAILY


 IV


   3/23/21 09:00


    3/28/21 09:14





 


 Pantoprazole


 Sodium


  (Protonix)  40 mg  DAILY


 PO


   3/23/21 09:00


 3/23/21 07:46 DC  





 


 Pantoprazole


 Sodium


  (Protonix)  40 mg  DAILY


 PO


   3/6/21 09:00


 3/22/21 15:58 DC 3/22/21 09:18





 


 Phenylephrine HCl


 50 mg/Dextrose  500 ml @ 


 7.2 mls/hr  Q24H


 IV


   3/22/21 21:05


 3/24/21 17:39 DC 3/22/21 23:24





 


 Phenylephrine HCl


 50 mg/Dextrose  500 ml @ 


 15 mls/hr  Q24H


 IV


   3/23/21 18:21


   UNV  





 


 Potassium


 Chloride/Dextrose/


 Sod Cl  1,000 ml @ 


 75 mls/hr  [id number]


 IV


   3/22/21 15:58


 3/24/21 09:26 DC 3/23/21 17:59





 


 Potassium


 Chloride/Dextrose/


 Sod Cl  1,000 ml @ 


 75 mls/hr  [id number]


 IV


   3/5/21 15:25


 3/5/21 17:11 DC  





 


 Prednisone


  (Deltasone)  60 mg  DAILY


 PO


   3/6/21 09:00


    3/28/21 09:13





 


 Sodium Chloride  1,000 ml @ 


 100 mls/hr  Q10H


 IV


   3/24/21 12:25


 3/24/21 17:37 DC 3/24/21 12:35





 


 Sodium Chloride


  (Saline Lock


 Flush)  2 ml  ASDIRECTED  PRN


 IV


 SEE LABEL COMMENTS  3/15/21 08:00


     





 


 Sodium Chloride


  (Saline Lock


 Flush)  2 ml  SLF


 IV


   3/15/21 14:00


    3/28/21 05:44





 


 Sodium Chloride


  (Saline Lock


 Flush)  10 ml  ASDIRECTED  PRN


 IV


 SEE LABEL COMMENTS  3/17/21 05:10


 3/22/21 15:58 DC 3/20/21 08:35





 


 Sodium Chloride


  (Saline Lock


 Flush)  10 ml  ASDIRECTED  PRN


 IV


 SEE LABEL COMMENTS  3/27/21 16:30


     





 


 Sodium Chloride


  (Saline Lock


 Flush)  10 ml  SLF


 IV


   3/27/21 22:00


    3/28/21 14:09














Allergies


Coded Allergies:  


     No Known Drug Allergies (Verified  Allergy, Unknown, 3/5/21)











Jolanta Arias MD            Mar 28, 2021 15:14

## 2021-03-28 NOTE — REP
INDICATION:

pneumothorax.



COMPARISON:

Comparison chest x-ray March 27, 2021.



TECHNIQUE:

Two views..



FINDINGS:

Two right-sided chest tubes persist in the apex.  Tiny sliver of apical pleural air.

Right subclavian central venous catheter is is seen in place with its tip in the

expected location of the right atrium.  Diffuse interstitial lung disease is again

noted.  Heart is mildly enlarged.  No new focal infiltrate is seen.



IMPRESSION:

Two right chest tubes remain in place.  Lung fields unchanged..





<Electronically signed by Dmitri Adamson > 03/28/21 [id number]

## 2021-03-29 VITALS — DIASTOLIC BLOOD PRESSURE: 86 MMHG | SYSTOLIC BLOOD PRESSURE: 121 MMHG

## 2021-03-29 VITALS — SYSTOLIC BLOOD PRESSURE: 120 MMHG | DIASTOLIC BLOOD PRESSURE: 83 MMHG

## 2021-03-29 VITALS — DIASTOLIC BLOOD PRESSURE: 75 MMHG | SYSTOLIC BLOOD PRESSURE: 107 MMHG

## 2021-03-29 VITALS — DIASTOLIC BLOOD PRESSURE: 71 MMHG | SYSTOLIC BLOOD PRESSURE: 109 MMHG

## 2021-03-29 VITALS — SYSTOLIC BLOOD PRESSURE: 110 MMHG | DIASTOLIC BLOOD PRESSURE: 71 MMHG

## 2021-03-29 VITALS — DIASTOLIC BLOOD PRESSURE: 77 MMHG | SYSTOLIC BLOOD PRESSURE: 114 MMHG

## 2021-03-29 VITALS — SYSTOLIC BLOOD PRESSURE: 115 MMHG | DIASTOLIC BLOOD PRESSURE: 63 MMHG

## 2021-03-29 VITALS — DIASTOLIC BLOOD PRESSURE: 78 MMHG | SYSTOLIC BLOOD PRESSURE: 110 MMHG

## 2021-03-29 VITALS — OXYGEN SATURATION: 91 %

## 2021-03-29 LAB
ALBUMIN SERPL BCG-MCNC: 2.3 GM/DL (ref 3.2–5.2)
ALT SERPL W P-5'-P-CCNC: 36 U/L (ref 12–78)
BILIRUB SERPL-MCNC: 0.4 MG/DL (ref 0.2–1)
BUN SERPL-MCNC: 20 MG/DL (ref 7–18)
CALCIUM SERPL-MCNC: 8 MG/DL (ref 8.8–10.2)
CHLORIDE SERPL-SCNC: 102 MEQ/L (ref 98–107)
CO2 SERPL-SCNC: 39 MEQ/L (ref 21–32)
CREAT SERPL-MCNC: 0.45 MG/DL (ref 0.7–1.3)
GFR SERPL CREATININE-BSD FRML MDRD: > 60 ML/MIN/{1.73_M2} (ref 49–?)
GLUCOSE SERPL-MCNC: 67 MG/DL (ref 70–100)
HCT VFR BLD AUTO: 40.9 % (ref 42–52)
HGB BLD-MCNC: 12.9 G/DL (ref 13.5–17.5)
MCH RBC QN AUTO: 29.4 PG (ref 27–33)
MCHC RBC AUTO-ENTMCNC: 31.5 G/DL (ref 32–36.5)
MCV RBC AUTO: 93.2 FL (ref 80–96)
PLATELET # BLD AUTO: 190 10^3/UL (ref 150–450)
POTASSIUM SERPL-SCNC: 3.9 MEQ/L (ref 3.5–5.1)
PROT SERPL-MCNC: 4.5 GM/DL (ref 6.4–8.2)
RBC # BLD AUTO: 4.39 10^6/UL (ref 4.3–6.1)
SODIUM SERPL-SCNC: 141 MEQ/L (ref 136–145)
WBC # BLD AUTO: 10.7 10^3/UL (ref 4–10)

## 2021-03-29 RX ADMIN — SODIUM CHLORIDE SCH ML: 9 INJECTION, SOLUTION INTRAMUSCULAR; INTRAVENOUS; SUBCUTANEOUS at 13:46

## 2021-03-29 RX ADMIN — Medication SCH UNITS: at 22:11

## 2021-03-29 RX ADMIN — SODIUM CHLORIDE SCH ML: 9 INJECTION, SOLUTION INTRAMUSCULAR; INTRAVENOUS; SUBCUTANEOUS at 05:18

## 2021-03-29 RX ADMIN — INSULIN LISPRO SCH UNITS: 100 INJECTION, SOLUTION INTRAVENOUS; SUBCUTANEOUS at 13:46

## 2021-03-29 RX ADMIN — DOCUSATE SODIUM SCH MG: 100 CAPSULE, LIQUID FILLED ORAL at 21:00

## 2021-03-29 RX ADMIN — LEVALBUTEROL HYDROCHLORIDE SCH MG: 1.25 SOLUTION, CONCENTRATE RESPIRATORY (INHALATION) at 01:52

## 2021-03-29 RX ADMIN — SODIUM CHLORIDE SCH UNITS: 4.5 INJECTION, SOLUTION INTRAVENOUS at 08:14

## 2021-03-29 RX ADMIN — INSULIN DETEMIR SCH UNITS: 100 INJECTION, SOLUTION SUBCUTANEOUS at 22:13

## 2021-03-29 RX ADMIN — HYDROXYCHLOROQUINE SULFATE SCH MG: 200 TABLET, FILM COATED ENTERAL at 08:13

## 2021-03-29 RX ADMIN — Medication SCH UNITS: at 05:18

## 2021-03-29 RX ADMIN — SODIUM CHLORIDE SCH UNITS: 4.5 INJECTION, SOLUTION INTRAVENOUS at 22:11

## 2021-03-29 RX ADMIN — MAGNESIUM HYDROXIDE SCH ML: 400 SUSPENSION ORAL at 08:04

## 2021-03-29 RX ADMIN — LEVALBUTEROL HYDROCHLORIDE SCH MG: 1.25 SOLUTION, CONCENTRATE RESPIRATORY (INHALATION) at 19:45

## 2021-03-29 RX ADMIN — MIDODRINE HYDROCHLORIDE SCH MG: 5 TABLET ORAL at 12:00

## 2021-03-29 RX ADMIN — LEVALBUTEROL HYDROCHLORIDE SCH MG: 1.25 SOLUTION, CONCENTRATE RESPIRATORY (INHALATION) at 07:03

## 2021-03-29 RX ADMIN — INSULIN DETEMIR SCH UNITS: 100 INJECTION, SOLUTION SUBCUTANEOUS at 08:03

## 2021-03-29 RX ADMIN — MIDODRINE HYDROCHLORIDE SCH MG: 5 TABLET ORAL at 07:48

## 2021-03-29 RX ADMIN — SODIUM CHLORIDE SCH ML: 9 INJECTION, SOLUTION INTRAMUSCULAR; INTRAVENOUS; SUBCUTANEOUS at 22:11

## 2021-03-29 RX ADMIN — LEVALBUTEROL HYDROCHLORIDE SCH MG: 1.25 SOLUTION, CONCENTRATE RESPIRATORY (INHALATION) at 13:44

## 2021-03-29 RX ADMIN — INSULIN LISPRO SCH UNITS: 100 INJECTION, SOLUTION INTRAVENOUS; SUBCUTANEOUS at 07:30

## 2021-03-29 RX ADMIN — INSULIN LISPRO SCH UNITS: 100 INJECTION, SOLUTION INTRAVENOUS; SUBCUTANEOUS at 22:13

## 2021-03-29 RX ADMIN — INSULIN LISPRO SCH UNITS: 100 INJECTION, SOLUTION INTRAVENOUS; SUBCUTANEOUS at 18:22

## 2021-03-29 RX ADMIN — MIDODRINE HYDROCHLORIDE SCH MG: 5 TABLET ORAL at 16:33

## 2021-03-29 RX ADMIN — SODIUM CHLORIDE, PRESERVATIVE FREE SCH ML: 5 INJECTION INTRAVENOUS at 22:11

## 2021-03-29 RX ADMIN — DOCUSATE SODIUM SCH MG: 100 CAPSULE, LIQUID FILLED ORAL at 08:04

## 2021-03-29 RX ADMIN — Medication SCH UNITS: at 13:46

## 2021-03-29 RX ADMIN — ATOVAQUONE SCH MG: 750 SUSPENSION ORAL at 08:13

## 2021-03-29 RX ADMIN — SODIUM CHLORIDE, PRESERVATIVE FREE SCH ML: 5 INJECTION INTRAVENOUS at 13:04

## 2021-03-29 RX ADMIN — SODIUM CHLORIDE, PRESERVATIVE FREE SCH ML: 5 INJECTION INTRAVENOUS at 03:15

## 2021-03-29 RX ADMIN — HYDROXYCHLOROQUINE SULFATE SCH MG: 200 TABLET, FILM COATED ENTERAL at 22:10

## 2021-03-29 RX ADMIN — DEXTROSE MONOHYDRATE SCH MG: 50 INJECTION, SOLUTION INTRAVENOUS at 08:13

## 2021-03-29 NOTE — REP
INDICATION:

chest tube, PF



COMPARISON:

03/28/2021



TECHNIQUE:

PA and lateral.



FINDINGS:

Two right-sided chest tubes extend to the right apex in stable position.  Small

residual right apical pneumothorax again identified.  Lung fields demonstrate diffuse

chronic COPD/interstitial changes and scattered fibrosis.  Superimposed bilateral

airspace disease similar to prior examination cannot be excluded.  No obvious

effusion.  No obvious new acute process identified.  Mediastinum and cardiac

silhouette stable.



IMPRESSION:

1. No significant change from prior examination.

2. Right-sided chest tubes and small residual right apical pneumothorax again noted.

3. Bilateral parenchymal changes similar to prior examination.





<Electronically signed by Nixon Rojas > 03/29/21 [id number]

## 2021-03-29 NOTE — IPNPDOC
Date Seen


The patient was seen on 3/29/21.





Progress Note


SUBJECTIVE: 


Chest tube remains on 40 cm suction without air leak as concern. Per CT surgery,

lung is not staying up and retracting from chest wall. He denies incr chest 

pain, shortness of breath, n/v/d. 





OBJECTIVE: 





PHYSICAL EXAM: 


VITAL SIGNS: Please see below 


GENERAL: In NAD, resting in bed


LUNGS: Improved breath sounds b/l, crackles mild b/l bases. 


CVS: S1 and S2, sinus rhythm. No murmurs, rubs or gallops.


GI:  Soft, nontender and nondistended. Positive bowel sounds.


CHEST: Right subclavian line 


BACK: chest tube


EXTREMITIES: No cyanosis, clubbing or pitting edema. 


: monteiro catheter 


NEURO: CN 2-12 intact, no focal deficits. 





LABORATORY DATA: Please see below 





MICROBIOLOGY: Please see below 





IMAGING: 





CXR today: Similar to prior exams 





CXR 3/25/21: 


There is no significant interval change.





CXR 3/24/21: 


Findings essentially unchanged.  Two right chest tubes remain in place at the 

apex.





CXR 3/23/21: 


The subcutaneous emphysema along the right lateral chest wall has decreased. 

Question fracture tip of the right scapular versus superimposition artifact. No 

other interval change.





ASSESSMENT AND PLAN:  This is a 65-year-old male with a history of COPD, 

idiopathic pulmonary fibrosis, SLE managed by his rheumatologist, pulmonologist 

as an outpatient with Cytoxan planned for the future and chronic Plaquenil and 

prednisone, presented to the ER with shortness of breath, found to have 

spontaneous pneumothorax, status post chest tube on 03/05 to 03/08 with


recurrent pneumothorax, reinserted on 03/10, status post blood patch 03/17 and 

03/19/2021. Admitted for persistent right sided spontaneous pneumothorax , POD 7

talc pleurodesis  03/22/21. 





PLAN: 





  1.  Spontaneous pneumothorax on the right with recurrence, status post blood 

patch on 03/17 and 03/19, POD 7 talc pleurodesis on 03/22.


  2.  COPD


  3.  Type 2 diabetes


  4.  History of idiopathic pulmonary fibrosis, SLE on chronic prednisone and 

Plaquenil. 


  5.  GERD


  6.  Alveolar pleural fistula.


  7.  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


  8.  Thrombocytopenia, improving with negative Heparin induced thrombocytopenia

panel.





DVT px: Heparin SC 





PLAN: Continue with 40 cm suction per CT surgery. Thoracic surgery following 

closely. Other chronic issues stable. Home when medically improved.





VS, I&O, 24H, Fishbone


Vital Signs/I&O





Vital Signs








  Date Time  Temp Pulse Resp B/P (MAP) Pulse Ox O2 Delivery O2 Flow Rate FiO2


 


3/29/21 12:23    120/83 (95)    


 


3/29/21 12:00 98.3 71 19  100 Nasal Cannula 2.0 














I&O- Last 24 Hours up to 6 AM 


 


 3/29/21





 06:00


 


Intake Total 450 ml


 


Output Total 316 ml


 


Balance 134 ml











Laboratory Data


24H LABS


Laboratory Tests 2


3/28/21 16:15: Bedside Glucose (Misc Panel) 402H


3/28/21 19:45: Bedside Glucose (Misc Panel) 332H


3/29/21 05:25: 


Nucleated Red Blood Cells % (auto) 0.0, Anion Gap 0L, Glomerular Filtration Rate

> 60.0, Calcium Level 8.0L, Total Bilirubin 0.4, Aspartate Amino Transf 

(AST/SGOT) 14, Alanine Aminotransferase (ALT/SGPT) 36, Alkaline Phosphatase 85, 

Total Protein 4.5L, Albumin 2.3L, Albumin/Globulin Ratio 1.0


3/29/21 07:44: Bedside Glucose (Misc Panel) 80


3/29/21 11:42: Bedside Glucose (Misc Panel) 145H


CBC/BMP


Laboratory Tests


3/29/21 05:25











Current Medications





Current Medications








 Medications


  (Trade)  Dose


 Ordered  Sig/Sadie


 Route


 PRN Reason  Start Time


 Stop Time Status Last Admin


Dose Admin


 


 Acetaminophen


  (Tylenol Tab)  650 mg  Q6HP  PRN


 PO


 T > 101.5 or HA  3/22/21 16:00


     





 


 Acetaminophen


  (Tylenol Tab)  650 mg  Q6HP  PRN


 PO


 T > 101.5 or HA  3/5/21 15:25


   Cancel  





 


 Acetaminophen/


 Hydrocodone Bitart


  (Norco, Anexsia


 5/325)  1 tab  Q3H  PRN


 PO


 MILD PAIN (PS 1-4)  3/22/21 16:00


    3/28/21 21:20





 


 Acetaminophen/


 Hydrocodone Bitart


  (Norco, Anexsia


 5/325)  1 tab  Q3H  PRN


 PO


 MILD PAIN (PS 1-4)  3/5/21 15:25


 3/22/21 15:58 DC 3/15/21 20:26





 


 Atovaquone


  (Mepron 750mg/


 5ml Suspension)  1,500 mg  DAILY


 PO


   3/6/21 09:00


    3/29/21 08:13





 


 Bisacodyl


  (Dulcolax


 Suppository)  10 mg  Q4HP  PRN


 DE


 CONSTIPATION  3/22/21 16:00


     





 


 Bisacodyl


  (Dulcolax


 Suppository)  10 mg  Q4HP  PRN


 DE


 CONSTIPATION  3/5/21 15:25


   Cancel  





 


 Cefazolin Sodium


 1 gm/Dextrose  50 ml @ 


 100 mls/hr  Q8H


 IV


   3/22/21 21:00


 3/24/21 13:29 DC 3/24/21 12:38





 


 Dextrose


  (Dextrose 50%)  25 ml  ASDIRECTED  PRN


 IV


 SEE LABEL COMMENTS  3/5/21 17:35


   Cancel  





 


 Dextrose/Sodium


 Chloride  1,000 ml @ 


 75 mls/hr  [id number]


 IV


   3/5/21 17:10


 3/5/21 20:13 DC 3/5/21 17:32





 


 Diphenhydramine


 HCl


  (Benadryl)  12.5 mg  Q4HP  PRN


 IV


 ITCHING  3/22/21 14:40


   Cancel  





 


 Docusate Sodium


  (Colace)  100 mg  BID


 PO


   3/22/21 21:00


    3/27/21 21:36





 


 Docusate Sodium


  (Colace)  100 mg  BID


 PO


   3/5/21 21:00


 3/22/21 15:58 DC 3/20/21 08:33





 


 Fentanyl Citrate


  (Sublimaze)  25 mcg  Q5MP  PRN


 IV


 PAIN LEVEL 5-10  3/22/21 17:30


 3/22/21 18:30 DC  





 


 Fentanyl/


 Bupivacaine HCl  250 ml @ 4


 mls/hr  Q24H


 EPIDURAL


   3/22/21 14:40


 3/25/21 15:56 DC 3/24/21 17:23





 


 Glucagon


  (Glucagon)  1 mg  ASDIRECTED  PRN


 SC


 SEE LABEL COMMENTS  3/5/21 17:35


   Cancel  





 


 Glucose


  (Glucose)  16 GM  ASDIRECTED  PRN


 PO


 SEE LABEL COMMENTS  3/5/21 17:35


   Cancel  





 


 Heparin Sodium


  (Heparin Lock


 Flush 10units/ml)  10 units  ASDIRECTED  PRN


 IV


 SEE LABEL COMMENTS  3/17/21 05:10


 3/22/21 15:58 DC 3/20/21 08:34





 


 Heparin Sodium


  (Heparin Lock


 Flush 10units/ml)  10 units  ASDIRECTED  PRN


 IV


 SEE LABEL COMMENTS  3/27/21 16:30


     





 


 Heparin Sodium


  (Heparin Lock


 Flush 10units/ml)  10 units  HLF


 IV


   3/27/21 22:00


    3/29/21 05:18





 


 Heparin Sodium


  (Porcine)


  (Heparin)  5,000 units  Q12H


 SC


   3/22/21 21:00


 3/23/21 07:45 DC  





 


 Heparin Sodium


  (Porcine)


  (Heparin)  5,000 units  Q12H


 SC


   3/5/21 21:00


    3/29/21 08:14





 


 Home Med


  (Med Rec


 Complete!)    ASDIRECTED


 XX


   3/5/21 16:15


 3/5/21 16:15 DC  





 


 Hydromorphone HCl


  (Dilaudid)  0.2 mg  Q5MP  PRN


 IV


 PAIN LEVEL 4-7  3/22/21 17:30


 3/22/21 18:30 DC  





 


 Hydroxychloroquine


 Sulfate


  (Plaquenil)  200 mg  BID


 PO


   3/5/21 21:00


    3/29/21 08:13





 


 Insulin Detemir


  (Levemir Insulin)  6 units  BID


 SC


   3/18/21 09:00


 3/22/21 08:46 DC 3/21/21 21:57





 


 Insulin Detemir


  (Levemir Insulin)  6 units  QHS


 SC


   3/8/21 21:00


 3/9/21 19:23 DC 3/8/21 20:57





 


 Insulin Detemir


  (Levemir Insulin)  8 units  QHS


 SC


   3/25/21 21:00


    3/28/21 20:00





 


 Insulin Detemir


  (Levemir Insulin)  8 units  QHS


 SC


   3/9/21 21:00


 3/18/21 07:46 DC 3/17/21 21:09





 


 Insulin Detemir


  (Levemir Insulin)  12 units  BID


 SC


   3/22/21 21:00


 3/24/21 05:36 DC 3/23/21 22:02





 


 Insulin Detemir


  (Levemir Insulin)  15 units  QHS


 SC


   3/24/21 21:00


 3/25/21 10:46 DC 3/24/21 20:42





 


 Insulin Detemir


  (Levemir Insulin)  18 units  QAM


 SC


   3/24/21 09:00


 3/27/21 08:01 DC 3/24/21 09:09





 


 Insulin Detemir


  (Levemir Insulin)  25 units  QAM


 SC


   3/27/21 09:00


     





 


 Insulin Human


 Lispro


  (HumaLOG INSULIN)  SEE


 PROTOCOL


 TABLE  AC


 SC


   3/6/21 07:30


    3/28/21 17:06





 


 Insulin Human


 Lispro


  (HumaLOG INSULIN)  SEE


 PROTOCOL


 TABLE  QHS


 SC


   3/5/21 21:00


    3/28/21 20:01





 


 Ketorolac


 Tromethamine


  (ToRADol)  15 mg  Q6H


 IV


   3/5/21 18:00


 3/10/21 17:59 DC 3/10/21 12:53





 


 Ketorolac


 Tromethamine


  (ToRADol)  30 mg  Q6H


 IV


   3/22/21 17:00


 3/27/21 16:59 DC 3/27/21 11:33





 


 Levalbuterol HCl


  (Xopenex Neb)  1.25 mg  Q2HP  PRN


 NEB


 WHEEZING  3/5/21 15:25


     





 


 Levalbuterol HCl


  (Xopenex Neb)  1.25 mg  RQ6H


 NEB


   3/5/21 20:00


    3/29/21 07:03





 


 Lidocaine HCl


  (LIDOCAINE 1%


 MDV 20ml)  40 ml  STAT  STAT


 SC


   3/10/21 12:39


 3/10/21 12:41 DC 3/10/21 11:54





 


 Lidocaine HCl


  (Lmx 4/Anecream)  1 dose  Q8HP  PRN


 TOP


 PAIN  3/10/21 21:50


    3/10/21 23:45





 


 Magnesium


 Hydroxide


  (Milk Of


 Magnesia)  30 ml  DAILY


 PO


   3/23/21 09:00


     





 


 Magnesium


 Hydroxide


  (Milk Of


 Magnesia)  30 ml  DAILY


 PO


   3/6/21 09:00


 3/22/21 15:58 DC 3/10/21 09:04





 


 Metoclopramide HCl


  (REGLAN


 INJection)  10 mg  Q6HP  PRN


 IV


 NAUSEA  3/22/21 14:40


   Cancel  





 


 Midazolam HCl


  (Versed)  6 mg  ASDIRECTED  STAT


 IV


   3/10/21 12:39


 3/10/21 12:41 DC 3/10/21 11:50





 


 Midodrine


  (Proamatine)  5 mg  08,12,16


 PO


   3/19/21 13:00


    3/27/21 16:45





 


 Miscellaneous


  (Unresolved


 Clarification


 Entry)  SEE LABEL


 COMMENTS  DAILY


 XX


   3/11/21 09:00


 3/11/21 14:46 DC  





 


 Naloxone HCl


  (Narcan)  0.1 mg  Q5MP  PRN


 IV


 SEE LABEL COMMENTS  3/22/21 14:40


   Cancel  





 


 Non-Formulary


 Medication


  (Epidural/PCA


 Keys)  1 each  ASDIRECTED  PRN


 XX


 SEE LABEL COMMENTS  3/22/21 14:40


   Cancel  





 


 Non-Formulary


 Medication


  (Keys)    ASDIRECTED  PRN


 XX


 SEE LABEL COMMENTS  3/22/21 14:40


   Cancel  





 


 Ondansetron HCl


  (ZOFRAN


 INJection)  4 mg  Q4HP  PRN


 IV


 NAUSEA  3/22/21 16:00


     





 


 Ondansetron HCl


  (ZOFRAN


 INJection)  4 mg  Q4HP  PRN


 IV


 NAUSEA OR VOMITING  3/22/21 17:30


 3/22/21 18:30 DC  





 


 Ondansetron HCl


  (ZOFRAN


 INJection)  4 mg  Q4HP  PRN


 IV


 NAUSEA  3/5/21 15:25


   Cancel  





 


 Ondansetron HCl


  (ZOFRAN


 INJection)  4 mg  Q6HP  PRN


 IV


 REFRACTORY NAUSEA  3/22/21 14:40


   Cancel  





 


 Oxycodone HCl


  (Roxicodone,


 Oxyir)  5 mg  ASDIRECTED  PRN


 PO


 PAIN LEVEL 1-4  3/22/21 17:30


 3/22/21 18:30 DC  





 


 Oxycodone/


 Acetaminophen


  (Percocet 5mg/


 325mg Tablet)  1 tab  Q4H  PRN


 PO


 MODERATE PAIN (PS 5-7)  3/22/21 16:00


    3/28/21 12:19





 


 Oxycodone/


 Acetaminophen


  (Percocet 5mg/


 325mg Tablet)  1 tab  Q4H  PRN


 PO


 MODERATE PAIN (PS 5-7)  3/5/21 15:25


 3/22/21 15:58 DC 3/19/21 21:59





 


 Oxycodone/


 Acetaminophen


  (Percocet 5mg/


 325mg Tablet)  2 tab  Q4H  PRN


 PO


 SEVERE PAIN (PS 8-10)  3/22/21 16:00


     





 


 Oxycodone/


 Acetaminophen


  (Percocet 5mg/


 325mg Tablet)  2 tab  Q4H  PRN


 PO


 SEVERE PAIN (PS 8-10)  3/5/21 15:25


 3/22/21 15:58 DC 3/12/21 22:59





 


 Pantoprazole


 Sodium


  (Protonix)  40 mg  DAILY


 IV


   3/23/21 09:00


    3/29/21 08:13





 


 Pantoprazole


 Sodium


  (Protonix)  40 mg  DAILY


 PO


   3/23/21 09:00


 3/23/21 07:46 DC  





 


 Pantoprazole


 Sodium


  (Protonix)  40 mg  DAILY


 PO


   3/6/21 09:00


 3/22/21 15:58 DC 3/22/21 09:18





 


 Phenylephrine HCl


 50 mg/Dextrose  500 ml @ 


 7.2 mls/hr  Q24H


 IV


   3/22/21 21:05


 3/24/21 17:39 DC 3/22/21 23:24





 


 Phenylephrine HCl


 50 mg/Dextrose  500 ml @ 


 15 mls/hr  Q24H


 IV


   3/23/21 18:21


   UNV  





 


 Potassium


 Chloride/Dextrose/


 Sod Cl  1,000 ml @ 


 75 mls/hr  [id number]


 IV


   3/22/21 15:58


 3/24/21 09:26 DC 3/23/21 17:59





 


 Potassium


 Chloride/Dextrose/


 Sod Cl  1,000 ml @ 


 75 mls/hr  [id number]


 IV


   3/5/21 15:25


 3/5/21 17:11 DC  





 


 Prednisone


  (Deltasone)  60 mg  DAILY


 PO


   3/6/21 09:00


    3/29/21 08:13





 


 Sodium Chloride  1,000 ml @ 


 100 mls/hr  Q10H


 IV


   3/24/21 12:25


 3/24/21 17:37 DC 3/24/21 12:35





 


 Sodium Chloride


  (Saline Lock


 Flush)  2 ml  ASDIRECTED  PRN


 IV


 SEE LABEL COMMENTS  3/15/21 08:00


     





 


 Sodium Chloride


  (Saline Lock


 Flush)  2 ml  SLF


 IV


   3/15/21 14:00


    3/28/21 05:44





 


 Sodium Chloride


  (Saline Lock


 Flush)  10 ml  ASDIRECTED  PRN


 IV


 SEE LABEL COMMENTS  3/17/21 05:10


 3/22/21 15:58 DC 3/20/21 08:35





 


 Sodium Chloride


  (Saline Lock


 Flush)  10 ml  ASDIRECTED  PRN


 IV


 SEE LABEL COMMENTS  3/27/21 16:30


     





 


 Sodium Chloride


  (Saline Lock


 Flush)  10 ml  SLF


 IV


   3/27/21 22:00


    3/29/21 05:18














Allergies


Coded Allergies:  


     No Known Drug Allergies (Verified  Allergy, Unknown, 3/5/21)











Jolanta Arias MD            Mar 29, 2021 13:29

## 2021-03-29 NOTE — IPN
PROGRESS NOTE



DATE:  03/29/2021



SUBJECTIVE: Patient is seen and examined at the bedside this morning.  He

continues to do well denying any fever, chills, chest pain, shortness of

breath, abdominal pain, nausea, vomiting, constipation, or diarrhea.



OBJECTIVE: 

Vital signs:  Afebrile overnight, temperature of 97.3, pulse 77 and regular,

respiratory rate of 19, blood pressure 114/77, saturating 98% on 2 liters nasal

cannula.

Intake and output:  Intake of 810 mL in the last 24 hours, output of 311 with

111 mL of drainage from his chest tube with no air leak.



PHYSICAL EXAMINATION:

General:  Patient is a thin appearing male who appears stated age, sitting

upright at bedside in no acute distress.

HEENT:  Head is normocephalic, atraumatic.  Extraocular movements intact.  No

scleral icterus.  Mucous membranes are moist.

Respiratory:  Equal breath sounds bilaterally with the same crackles on

bilateral lobes, particularly more so at the left side and a somewhat

bronchophonous sound on the right side that may be from the chest tube itself.

Cardiac:  Regular rate and rhythm, normal S1 and S2, no murmurs, gallops, or

rubs.

Abdomen:  Soft, nontender, nondistended, bowel sounds positive.  No

hepatosplenomegaly.  No costovertebral (CVA) tenderness.

Extremities:  No pitting edema in the upper or lower extremities.

Skin:  Warm, dry, perfusing well.

Neurologic:  Cranial nerves II-XII are intact.  Sensation is intact.

Psychiatric:  Awake, alert, oriented times three.  Appropriate mood and affect.



LABORATORY DATA:  White blood cell count of 10.7, hemoglobin 12.9, hematocrit

40.9, platelet count of 190, sodium 141, potassium 3.9, chloride 102,

bicarbonate 39, BUN 20, creatinine 0.45, glucose 67, calcium 8.0, total

bilirubin 0.4, AST 14, ALT 36, alkaline phosphatase 85, total protein 4.5,

albumin 2.3.



IMAGING:  Chest x-ray sharp costophrenic angles.  Normal heart borders.  No

tracheal deviation.  Ongoing very small air space between the chest wall and

the right upper lobe's pleural lining.  No subcutaneous emphysema.  Very thin

air space.



ASSESSMENT AND PLAN: 

1.  Postoperative day #7 status post talc pleurodesis, wedge resection, and

bullectomy.  Will continue chest tube at -40 cm for another 3-4 more days to

allow more time for the pleura to stick to the lining of the chest wall. 

Unfortunately, we are tied down by the fact that he requires steroids for his

pulmonary fibrosis and this is likely dampening the inflammatory response so

that he cannot have scar formation.  Will continue to follow along and reassess

in 3-4 more days by turning down suction.



2.  Spontaneous pneumothorax, right-sided, recurrent.



3.  Alveolar pleural fistula, now controlled.



4.  Advanced interstitial lung disease, clinically idiopathic pulmonary

fibrosis.



5.  Underlying chronic obstructive pulmonary disease (COPD).



6.  Hypoxic polycythemia.



7.  Gastroesophageal reflux disease (GERD).



8.  Diabetes.



My faculty preceptor for this patient encounter was physically present during

the encounter and was fully available.  All aspects of the patient interview,

examination, medical decision making process, and medical care plan development

were reviewed and approved by the faculty preceptor. The faculty preceptor is

aware and concurs with the plan as stated in the body of this note and will

attest to such by his/her co-signature.

## 2021-03-29 NOTE — IPN
PROGRESS NOTE



DATE:  03/28/2021



SUBJECTIVE: This is now the 6th postoperative day for Mr. Christine. Yesterday, I

noted on his chest x-ray that there was some slight separation from the chest

wall in the upper hemithorax. I took him off suction, repeated a chest x-ray

later in the day, and the chest wall separation was even more, but not very

much. Nonetheless as it is very important for that lung to adhere to the chest

wall and that he is on steroids to inhibit the inflammatory response, I

therefore placed him back on 40 cm of suction. 



Today he is doing well. He is breathing well. The pain is being fairly well

controlled at the chest tube insertion site. 



OBJECTIVE: 

VITAL SIGNS: Show a T-max of 98.2. He has never been febrile since the

operation and I suspect that is secondary to the steroids. His heart rate

ranges between 84 and 73 in sinus rhythm. Respiratory rate of constant 19 who

is 96% to 98% saturated on 2 liters nasal cannula and whose blood pressure is

ranging between 133/73 to 113/79. 

INTAKE AND OUTPUT: Over the past 24 hours has been recorded as 870 in and 398

out for a positivity of 472 mL. He weight 71.2 kg compared to 71.9 kg yesterday.

RESPIRATORY: He has equal breath sounds on either side with fine inspiratory

velcro crackles at the base on the left side and a pleural friction rub on the

right side. 

CARDIAC: Without murmurs, clicks, gallops, or rubs. I cannot feel his PMI. S1

and S2 are normal. 

ABDOMEN: Soft and nontender. Bowel sounds are positive. There is no

hepatomegaly. No CVA tenderness. 

EXTREMITIES: Show no pretibial edema. No calf tenderness. No differential

swelling of the upper extremities. 

SKIN: Warm, dry, and perfused without cyanosis or mottling, including that of

the nail beds and knees. 

NECK: Supple. There is no jugular venous distention. No subcutaneous emphysema.

Trachea is midline.

MOUTH: Shows the mucous membranes to be pink and moist. Lips and commisures are

without lesions and no thrush.

EYES: Show his pupils equal and reactive. Extraocular muscles are intact.

Sclerae nonicteric. 

NEUROLOGIC: Shows II through XII intact. Normal gross motor, gross sensation

intact. Gait is not tested. 

PSYCHIATRIC: Shows him to be awake, alert, and oriented x3 with appropriate

mood and affect and conversational. 



LABORATORY DATA: His white count today is 9.8 with a hemoglobin and hematocrit

of 9.2 and 41.9 and a platelet count of 182,000. Differential shows 75%

neutrophils, 11% lymphocytes, 5% monocytes. There are no immature forms and no

toxic granulations. 



His electrolytes are normal except for an elevated total CO2 of 38 reflective

of his pulmonary fibrosis. BUN and creatinine are 18 and 0.38 with a calcium of

7.9 and a glucose of 91. 



IMAGING DATA: His chest x-ray today shows his lung fully expands to the chest

wall on 40 cm of suction. He has the reticular pattern reflective of his

underlying pulmonary fibrosis. There is volume loss of the right side. 



IMPRESSION:

1.  Postoperative day #6 status post talc pleurodesis, wedge resection, and

    bullectomy. 

2.  Spontaneous pneumothorax right side recurrent. 

3.  Alveolopleural fistula now controlled. 

4.  Advanced interstitial lung disease clinically idiopathic pulmonary

    fibrosis. 

5.  Underlying chronic obstructive pulmonary disease (COPD). 

6.  Hypoxic polycythemia.

7.  Gastroesophageal reflux disease. 

8.  Diabetes.



PLAN AND DISCUSSION: I will continue him on 40 cm of suction. There is no air

leak and I am quite gratified of that. I think that the weight of his fibrotic

lung and severe low compliance is causing his lung to retract from the chest

wall. The inflammatory response is being inhibited by his underlying steroids

that he is on for his pulmonary fibrosis. It will take a little bit longer for

the lung to therefore adhere and the inflammatory response to end in scar.

## 2021-03-30 VITALS — OXYGEN SATURATION: 91 %

## 2021-03-30 VITALS — DIASTOLIC BLOOD PRESSURE: 71 MMHG | SYSTOLIC BLOOD PRESSURE: 120 MMHG

## 2021-03-30 VITALS — DIASTOLIC BLOOD PRESSURE: 91 MMHG | SYSTOLIC BLOOD PRESSURE: 130 MMHG

## 2021-03-30 VITALS — DIASTOLIC BLOOD PRESSURE: 57 MMHG | SYSTOLIC BLOOD PRESSURE: 102 MMHG

## 2021-03-30 VITALS — SYSTOLIC BLOOD PRESSURE: 115 MMHG | DIASTOLIC BLOOD PRESSURE: 73 MMHG

## 2021-03-30 VITALS — SYSTOLIC BLOOD PRESSURE: 112 MMHG | DIASTOLIC BLOOD PRESSURE: 69 MMHG

## 2021-03-30 VITALS — DIASTOLIC BLOOD PRESSURE: 59 MMHG | SYSTOLIC BLOOD PRESSURE: 104 MMHG

## 2021-03-30 LAB
ALBUMIN SERPL BCG-MCNC: 2.5 GM/DL (ref 3.2–5.2)
ALT SERPL W P-5'-P-CCNC: 39 U/L (ref 12–78)
BILIRUB SERPL-MCNC: 0.4 MG/DL (ref 0.2–1)
BUN SERPL-MCNC: 17 MG/DL (ref 7–18)
CALCIUM SERPL-MCNC: 8.4 MG/DL (ref 8.8–10.2)
CHLORIDE SERPL-SCNC: 97 MEQ/L (ref 98–107)
CO2 SERPL-SCNC: 40 MEQ/L (ref 21–32)
CREAT SERPL-MCNC: 0.55 MG/DL (ref 0.7–1.3)
GFR SERPL CREATININE-BSD FRML MDRD: > 60 ML/MIN/{1.73_M2} (ref 49–?)
GLUCOSE SERPL-MCNC: 93 MG/DL (ref 70–100)
HCT VFR BLD AUTO: 43.9 % (ref 42–52)
HGB BLD-MCNC: 13.6 G/DL (ref 13.5–17.5)
MCH RBC QN AUTO: 29.2 PG (ref 27–33)
MCHC RBC AUTO-ENTMCNC: 31 G/DL (ref 32–36.5)
MCV RBC AUTO: 94.2 FL (ref 80–96)
PLATELET # BLD AUTO: 191 10^3/UL (ref 150–450)
POTASSIUM SERPL-SCNC: 4.1 MEQ/L (ref 3.5–5.1)
PROT SERPL-MCNC: 5.3 GM/DL (ref 6.4–8.2)
RBC # BLD AUTO: 4.66 10^6/UL (ref 4.3–6.1)
SODIUM SERPL-SCNC: 137 MEQ/L (ref 136–145)
WBC # BLD AUTO: 10.7 10^3/UL (ref 4–10)

## 2021-03-30 RX ADMIN — SODIUM CHLORIDE, PRESERVATIVE FREE SCH ML: 5 INJECTION INTRAVENOUS at 21:49

## 2021-03-30 RX ADMIN — HYDROXYCHLOROQUINE SULFATE SCH MG: 200 TABLET, FILM COATED ENTERAL at 09:35

## 2021-03-30 RX ADMIN — INSULIN LISPRO SCH UNITS: 100 INJECTION, SOLUTION INTRAVENOUS; SUBCUTANEOUS at 07:18

## 2021-03-30 RX ADMIN — LEVALBUTEROL HYDROCHLORIDE SCH MG: 1.25 SOLUTION, CONCENTRATE RESPIRATORY (INHALATION) at 01:48

## 2021-03-30 RX ADMIN — SODIUM CHLORIDE SCH ML: 9 INJECTION, SOLUTION INTRAMUSCULAR; INTRAVENOUS; SUBCUTANEOUS at 05:55

## 2021-03-30 RX ADMIN — SODIUM CHLORIDE, PRESERVATIVE FREE SCH ML: 5 INJECTION INTRAVENOUS at 05:55

## 2021-03-30 RX ADMIN — DEXTROSE MONOHYDRATE SCH MG: 50 INJECTION, SOLUTION INTRAVENOUS at 09:34

## 2021-03-30 RX ADMIN — MAGNESIUM HYDROXIDE SCH ML: 400 SUSPENSION ORAL at 08:19

## 2021-03-30 RX ADMIN — Medication SCH UNITS: at 05:55

## 2021-03-30 RX ADMIN — MIDODRINE HYDROCHLORIDE SCH MG: 5 TABLET ORAL at 08:00

## 2021-03-30 RX ADMIN — LEVALBUTEROL HYDROCHLORIDE SCH MG: 1.25 SOLUTION, CONCENTRATE RESPIRATORY (INHALATION) at 07:27

## 2021-03-30 RX ADMIN — INSULIN LISPRO SCH UNITS: 100 INJECTION, SOLUTION INTRAVENOUS; SUBCUTANEOUS at 21:49

## 2021-03-30 RX ADMIN — INSULIN DETEMIR SCH UNITS: 100 INJECTION, SOLUTION SUBCUTANEOUS at 21:49

## 2021-03-30 RX ADMIN — LEVALBUTEROL HYDROCHLORIDE SCH MG: 1.25 SOLUTION, CONCENTRATE RESPIRATORY (INHALATION) at 13:23

## 2021-03-30 RX ADMIN — SODIUM CHLORIDE SCH ML: 9 INJECTION, SOLUTION INTRAMUSCULAR; INTRAVENOUS; SUBCUTANEOUS at 13:21

## 2021-03-30 RX ADMIN — SODIUM CHLORIDE SCH UNITS: 4.5 INJECTION, SOLUTION INTRAVENOUS at 21:48

## 2021-03-30 RX ADMIN — Medication SCH UNITS: at 21:49

## 2021-03-30 RX ADMIN — Medication SCH UNITS: at 13:20

## 2021-03-30 RX ADMIN — ATOVAQUONE SCH MG: 750 SUSPENSION ORAL at 09:34

## 2021-03-30 RX ADMIN — SODIUM CHLORIDE SCH UNITS: 4.5 INJECTION, SOLUTION INTRAVENOUS at 09:34

## 2021-03-30 RX ADMIN — DOCUSATE SODIUM SCH MG: 100 CAPSULE, LIQUID FILLED ORAL at 08:19

## 2021-03-30 RX ADMIN — MIDODRINE HYDROCHLORIDE SCH MG: 5 TABLET ORAL at 13:20

## 2021-03-30 RX ADMIN — INSULIN DETEMIR SCH UNITS: 100 INJECTION, SOLUTION SUBCUTANEOUS at 08:18

## 2021-03-30 RX ADMIN — SODIUM CHLORIDE, PRESERVATIVE FREE SCH ML: 5 INJECTION INTRAVENOUS at 13:21

## 2021-03-30 RX ADMIN — LEVALBUTEROL HYDROCHLORIDE SCH MG: 1.25 SOLUTION, CONCENTRATE RESPIRATORY (INHALATION) at 20:37

## 2021-03-30 RX ADMIN — INSULIN LISPRO SCH UNITS: 100 INJECTION, SOLUTION INTRAVENOUS; SUBCUTANEOUS at 13:20

## 2021-03-30 RX ADMIN — SODIUM CHLORIDE SCH ML: 9 INJECTION, SOLUTION INTRAMUSCULAR; INTRAVENOUS; SUBCUTANEOUS at 21:49

## 2021-03-30 RX ADMIN — MIDODRINE HYDROCHLORIDE SCH MG: 5 TABLET ORAL at 16:00

## 2021-03-30 RX ADMIN — INSULIN LISPRO SCH UNITS: 100 INJECTION, SOLUTION INTRAVENOUS; SUBCUTANEOUS at 18:39

## 2021-03-30 RX ADMIN — DOCUSATE SODIUM SCH MG: 100 CAPSULE, LIQUID FILLED ORAL at 21:00

## 2021-03-30 RX ADMIN — HYDROXYCHLOROQUINE SULFATE SCH MG: 200 TABLET, FILM COATED ENTERAL at 21:47

## 2021-03-30 NOTE — IPNPDOC
Date Seen


The patient was seen on 3/30/21.





Progress Note


SUBJECTIVE: 


Has chest tube with 40 cm suction without air leak. CXR today appears similar to

prior. Remains on 2 L NC; however, he denies incr chest pain, shortness of 

breath, n/v/d. 





OBJECTIVE: 





PHYSICAL EXAM: 


VITAL SIGNS: Please see below 


GENERAL: In NAD, resting in bed


LUNGS: Improved breath sounds b/l, crackles mild b/l bases. 


CVS: S1 and S2, sinus rhythm. No murmurs, rubs or gallops.


GI:  Soft, nontender and nondistended. Positive bowel sounds.


CHEST: Right subclavian line 


BACK: chest tube right side 


EXTREMITIES: No cyanosis, clubbing or pitting edema. 


: monteiro catheter 


NEURO: CN 2-12 intact, no focal deficits. 





LABORATORY DATA: Please see below 





MICROBIOLOGY: Please see below 





IMAGING: 





CXR today: Similar to prior exams 





CXR 3/25/21: 


There is no significant interval change.





CXR 3/24/21: 


Findings essentially unchanged.  Two right chest tubes remain in place at the 

apex.





CXR 3/23/21: 


The subcutaneous emphysema along the right lateral chest wall has decreased. 

Question fracture tip of the right scapular versus superimposition artifact. No 

other interval change.





ASSESSMENT AND PLAN:  This is a 65-year-old male with a history of COPD, 

idiopathic pulmonary fibrosis, SLE managed by his rheumatologist, pulmonologist 

as an outpatient with Cytoxan planned for the future and chronic Plaquenil and 

prednisone, presented to the ER with shortness of breath, found to have 

spontaneous pneumothorax, status post chest tube on 03/05 to 03/08 with


recurrent pneumothorax, reinserted on 03/10, status post blood patch 03/17 and 

03/19/2021. Admitted for persistent right sided spontaneous pneumothorax , POD 7

talc pleurodesis  03/22/21. 





PLAN: 





  1.  Spontaneous pneumothorax on the right with recurrence, status post blood 

patch on 03/17 and 03/19, POD 7 talc pleurodesis on 03/22.


  2.  COPD


  3.  Type 2 diabetes


  4.  History of idiopathic pulmonary fibrosis, SLE on chronic prednisone and 

Plaquenil. 


  5.  GERD


  6.  Alveolar pleural fistula.


  7.  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


  8.  Thrombocytopenia, improving with negative Heparin induced thrombocytopenia

panel.





DVT px: Heparin SC 





PLAN: Continue with 40 cm suction another 24H then will try water seal again 

3/31/21. Thoracic surgery following closely. Other chronic issues stable. Home 

when medically improved.





VS, I&O, 24H, Fishbone


Vital Signs/I&O





Vital Signs








  Date Time  Temp Pulse Resp B/P (MAP) Pulse Ox O2 Delivery O2 Flow Rate FiO2


 


3/30/21 12:00       2.0 


 


3/30/21 12:00 98.4 87 19 104/59 (74) 98 Nasal Cannula  














I&O- Last 24 Hours up to 6 AM 


 


 3/30/21





 06:00


 


Intake Total 1560 ml


 


Output Total 439 ml


 


Balance 1121 ml











Laboratory Data


24H LABS


Laboratory Tests 2


3/29/21 16:30: Bedside Glucose (Misc Panel) 280H


3/29/21 20:12: Bedside Glucose (Misc Panel) 339H


3/30/21 05:30: 


Nucleated Red Blood Cells % (auto) 0.0, Anion Gap 0L, Glomerular Filtration Rate

> 60.0, Calcium Level 8.4L, Total Bilirubin 0.4, Aspartate Amino Transf 

(AST/SGOT) 17, Alanine Aminotransferase (ALT/SGPT) 39, Alkaline Phosphatase 81, 

Total Protein 5.3L, Albumin 2.5L, Albumin/Globulin Ratio 0.9


3/30/21 11:45: Bedside Glucose (Misc Panel) 150H


CBC/BMP


Laboratory Tests


3/30/21 05:30











Current Medications





Current Medications








 Medications


  (Trade)  Dose


 Ordered  Sig/Sadie


 Route


 PRN Reason  Start Time


 Stop Time Status Last Admin


Dose Admin


 


 Acetaminophen


  (Tylenol Tab)  650 mg  Q6HP  PRN


 PO


 T > 101.5 or HA  3/22/21 16:00


     





 


 Acetaminophen


  (Tylenol Tab)  650 mg  Q6HP  PRN


 PO


 T > 101.5 or HA  3/5/21 15:25


   Cancel  





 


 Acetaminophen/


 Hydrocodone Bitart


  (Norco, Anexsia


 5/325)  1 tab  Q3H  PRN


 PO


 MILD PAIN (PS 1-4)  3/22/21 16:00


    3/28/21 21:20





 


 Acetaminophen/


 Hydrocodone Bitart


  (Norco, Anexsia


 5/325)  1 tab  Q3H  PRN


 PO


 MILD PAIN (PS 1-4)  3/5/21 15:25


 3/22/21 15:58 DC 3/15/21 20:26





 


 Atovaquone


  (Mepron 750mg/


 5ml Suspension)  1,500 mg  DAILY


 PO


   3/6/21 09:00


    3/30/21 09:34





 


 Bisacodyl


  (Dulcolax


 Suppository)  10 mg  Q4HP  PRN


 GA


 CONSTIPATION  3/22/21 16:00


     





 


 Bisacodyl


  (Dulcolax


 Suppository)  10 mg  Q4HP  PRN


 GA


 CONSTIPATION  3/5/21 15:25


   Cancel  





 


 Cefazolin Sodium


 1 gm/Dextrose  50 ml @ 


 100 mls/hr  Q8H


 IV


   3/22/21 21:00


 3/24/21 13:29 DC 3/24/21 12:38





 


 Dextrose


  (Dextrose 50%)  25 ml  ASDIRECTED  PRN


 IV


 SEE LABEL COMMENTS  3/5/21 17:35


   Cancel  





 


 Dextrose/Sodium


 Chloride  1,000 ml @ 


 75 mls/hr  [id number]


 IV


   3/5/21 17:10


 3/5/21 20:13 DC 3/5/21 17:32





 


 Diphenhydramine


 HCl


  (Benadryl)  12.5 mg  Q4HP  PRN


 IV


 ITCHING  3/22/21 14:40


   Cancel  





 


 Docusate Sodium


  (Colace)  100 mg  BID


 PO


   3/22/21 21:00


    3/27/21 21:36





 


 Docusate Sodium


  (Colace)  100 mg  BID


 PO


   3/5/21 21:00


 3/22/21 15:58 DC 3/20/21 08:33





 


 Fentanyl Citrate


  (Sublimaze)  25 mcg  Q5MP  PRN


 IV


 PAIN LEVEL 5-10  3/22/21 17:30


 3/22/21 18:30 DC  





 


 Fentanyl/


 Bupivacaine HCl  250 ml @ 4


 mls/hr  Q24H


 EPIDURAL


   3/22/21 14:40


 3/25/21 15:56 DC 3/24/21 17:23





 


 Glucagon


  (Glucagon)  1 mg  ASDIRECTED  PRN


 SC


 SEE LABEL COMMENTS  3/5/21 17:35


   Cancel  





 


 Glucose


  (Glucose)  16 GM  ASDIRECTED  PRN


 PO


 SEE LABEL COMMENTS  3/5/21 17:35


   Cancel  





 


 Heparin Sodium


  (Heparin Lock


 Flush 10units/ml)  10 units  ASDIRECTED  PRN


 IV


 SEE LABEL COMMENTS  3/17/21 05:10


 3/22/21 15:58 DC 3/20/21 08:34





 


 Heparin Sodium


  (Heparin Lock


 Flush 10units/ml)  10 units  ASDIRECTED  PRN


 IV


 SEE LABEL COMMENTS  3/27/21 16:30


     





 


 Heparin Sodium


  (Heparin Lock


 Flush 10units/ml)  10 units  HLF


 IV


   3/27/21 22:00


    3/30/21 13:20





 


 Heparin Sodium


  (Porcine)


  (Heparin)  5,000 units  Q12H


 SC


   3/22/21 21:00


 3/23/21 07:45 DC  





 


 Heparin Sodium


  (Porcine)


  (Heparin)  5,000 units  Q12H


 SC


   3/5/21 21:00


    3/30/21 09:34





 


 Home Med


  (Med Rec


 Complete!)    ASDIRECTED


 XX


   3/5/21 16:15


 3/5/21 16:15 DC  





 


 Hydromorphone HCl


  (Dilaudid)  0.2 mg  Q5MP  PRN


 IV


 PAIN LEVEL 4-7  3/22/21 17:30


 3/22/21 18:30 DC  





 


 Hydroxychloroquine


 Sulfate


  (Plaquenil)  200 mg  BID


 PO


   3/5/21 21:00


    3/30/21 09:35





 


 Insulin Detemir


  (Levemir Insulin)  6 units  BID


 SC


   3/18/21 09:00


 3/22/21 08:46 DC 3/21/21 21:57





 


 Insulin Detemir


  (Levemir Insulin)  6 units  QHS


 SC


   3/8/21 21:00


 3/9/21 19:23 DC 3/8/21 20:57





 


 Insulin Detemir


  (Levemir Insulin)  8 units  QHS


 SC


   3/25/21 21:00


    3/29/21 22:13





 


 Insulin Detemir


  (Levemir Insulin)  8 units  QHS


 SC


   3/9/21 21:00


 3/18/21 07:46 DC 3/17/21 21:09





 


 Insulin Detemir


  (Levemir Insulin)  12 units  BID


 SC


   3/22/21 21:00


 3/24/21 05:36 DC 3/23/21 22:02





 


 Insulin Detemir


  (Levemir Insulin)  15 units  QHS


 SC


   3/24/21 21:00


 3/25/21 10:46 DC 3/24/21 20:42





 


 Insulin Detemir


  (Levemir Insulin)  18 units  QAM


 SC


   3/24/21 09:00


 3/27/21 08:01 DC 3/24/21 09:09





 


 Insulin Detemir


  (Levemir Insulin)  25 units  QAM


 SC


   3/27/21 09:00


 3/30/21 08:03 DC  





 


 Insulin Detemir


  (Levemir Insulin)  35 units  QAM


 SC


   3/30/21 09:00


     





 


 Insulin Human


 Lispro


  (HumaLOG INSULIN)  SEE


 PROTOCOL


 TABLE  AC


 SC


   3/6/21 07:30


    3/30/21 13:20





 


 Insulin Human


 Lispro


  (HumaLOG INSULIN)  SEE


 PROTOCOL


 TABLE  QHS


 SC


   3/5/21 21:00


    3/29/21 22:13





 


 Ketorolac


 Tromethamine


  (ToRADol)  15 mg  Q6H


 IV


   3/5/21 18:00


 3/10/21 17:59 DC 3/10/21 12:53





 


 Ketorolac


 Tromethamine


  (ToRADol)  30 mg  Q6H


 IV


   3/22/21 17:00


 3/27/21 16:59 DC 3/27/21 11:33





 


 Levalbuterol HCl


  (Xopenex Neb)  1.25 mg  Q2HP  PRN


 NEB


 WHEEZING  3/5/21 15:25


     





 


 Levalbuterol HCl


  (Xopenex Neb)  1.25 mg  RQ6H


 NEB


   3/5/21 20:00


    3/30/21 13:23





 


 Lidocaine HCl


  (LIDOCAINE 1%


 MDV 20ml)  40 ml  STAT  STAT


 SC


   3/10/21 12:39


 3/10/21 12:41 DC 3/10/21 11:54





 


 Lidocaine HCl


  (Lmx 4/Anecream)  1 dose  Q8HP  PRN


 TOP


 PAIN  3/10/21 21:50


    3/10/21 23:45





 


 Magnesium


 Hydroxide


  (Milk Of


 Magnesia)  30 ml  DAILY


 PO


   3/23/21 09:00


     





 


 Magnesium


 Hydroxide


  (Milk Of


 Magnesia)  30 ml  DAILY


 PO


   3/6/21 09:00


 3/22/21 15:58 DC 3/10/21 09:04





 


 Metoclopramide HCl


  (REGLAN


 INJection)  10 mg  Q6HP  PRN


 IV


 NAUSEA  3/22/21 14:40


   Cancel  





 


 Midazolam HCl


  (Versed)  6 mg  ASDIRECTED  STAT


 IV


   3/10/21 12:39


 3/10/21 12:41 DC 3/10/21 11:50





 


 Midodrine


  (Proamatine)  5 mg  08,12,16


 PO


   3/19/21 13:00


    3/30/21 13:20





 


 Miscellaneous


  (Unresolved


 Clarification


 Entry)  SEE LABEL


 COMMENTS  DAILY


 XX


   3/11/21 09:00


 3/11/21 14:46 DC  





 


 Naloxone HCl


  (Narcan)  0.1 mg  Q5MP  PRN


 IV


 SEE LABEL COMMENTS  3/22/21 14:40


   Cancel  





 


 Non-Formulary


 Medication


  (Epidural/PCA


 Keys)  1 each  ASDIRECTED  PRN


 XX


 SEE LABEL COMMENTS  3/22/21 14:40


   Cancel  





 


 Non-Formulary


 Medication


  (Keys)    ASDIRECTED  PRN


 XX


 SEE LABEL COMMENTS  3/22/21 14:40


   Cancel  





 


 Ondansetron HCl


  (ZOFRAN


 INJection)  4 mg  Q4HP  PRN


 IV


 NAUSEA  3/22/21 16:00


     





 


 Ondansetron HCl


  (ZOFRAN


 INJection)  4 mg  Q4HP  PRN


 IV


 NAUSEA OR VOMITING  3/22/21 17:30


 3/22/21 18:30 DC  





 


 Ondansetron HCl


  (ZOFRAN


 INJection)  4 mg  Q4HP  PRN


 IV


 NAUSEA  3/5/21 15:25


   Cancel  





 


 Ondansetron HCl


  (ZOFRAN


 INJection)  4 mg  Q6HP  PRN


 IV


 REFRACTORY NAUSEA  3/22/21 14:40


   Cancel  





 


 Oxycodone HCl


  (Roxicodone,


 Oxyir)  5 mg  ASDIRECTED  PRN


 PO


 PAIN LEVEL 1-4  3/22/21 17:30


 3/22/21 18:30 DC  





 


 Oxycodone/


 Acetaminophen


  (Percocet 5mg/


 325mg Tablet)  1 tab  Q4H  PRN


 PO


 MODERATE PAIN (PS 5-7)  3/22/21 16:00


    3/29/21 22:10





 


 Oxycodone/


 Acetaminophen


  (Percocet 5mg/


 325mg Tablet)  1 tab  Q4H  PRN


 PO


 MODERATE PAIN (PS 5-7)  3/5/21 15:25


 3/22/21 15:58 DC 3/19/21 21:59





 


 Oxycodone/


 Acetaminophen


  (Percocet 5mg/


 325mg Tablet)  2 tab  Q4H  PRN


 PO


 SEVERE PAIN (PS 8-10)  3/22/21 16:00


     





 


 Oxycodone/


 Acetaminophen


  (Percocet 5mg/


 325mg Tablet)  2 tab  Q4H  PRN


 PO


 SEVERE PAIN (PS 8-10)  3/5/21 15:25


 3/22/21 15:58 DC 3/12/21 22:59





 


 Pantoprazole


 Sodium


  (Protonix)  40 mg  DAILY


 IV


   3/23/21 09:00


    3/30/21 09:34





 


 Pantoprazole


 Sodium


  (Protonix)  40 mg  DAILY


 PO


   3/23/21 09:00


 3/23/21 07:46 DC  





 


 Pantoprazole


 Sodium


  (Protonix)  40 mg  DAILY


 PO


   3/6/21 09:00


 3/22/21 15:58 DC 3/22/21 09:18





 


 Phenylephrine HCl


 50 mg/Dextrose  500 ml @ 


 7.2 mls/hr  Q24H


 IV


   3/22/21 21:05


 3/24/21 17:39 DC 3/22/21 23:24





 


 Phenylephrine HCl


 50 mg/Dextrose  500 ml @ 


 15 mls/hr  Q24H


 IV


   3/23/21 18:21


   UNV  





 


 Potassium


 Chloride/Dextrose/


 Sod Cl  1,000 ml @ 


 75 mls/hr  [id number]


 IV


   3/22/21 15:58


 3/24/21 09:26 DC 3/23/21 17:59





 


 Potassium


 Chloride/Dextrose/


 Sod Cl  1,000 ml @ 


 75 mls/hr  [id number]


 IV


   3/5/21 15:25


 3/5/21 17:11 DC  





 


 Prednisone


  (Deltasone)  60 mg  DAILY


 PO


   3/6/21 09:00


    3/30/21 09:34





 


 Sodium Chloride  1,000 ml @ 


 100 mls/hr  Q10H


 IV


   3/24/21 12:25


 3/24/21 17:37 DC 3/24/21 12:35





 


 Sodium Chloride


  (Saline Lock


 Flush)  2 ml  ASDIRECTED  PRN


 IV


 SEE LABEL COMMENTS  3/15/21 08:00


     





 


 Sodium Chloride


  (Saline Lock


 Flush)  2 ml  SLF


 IV


   3/15/21 14:00


    3/30/21 05:55





 


 Sodium Chloride


  (Saline Lock


 Flush)  10 ml  ASDIRECTED  PRN


 IV


 SEE LABEL COMMENTS  3/17/21 05:10


 3/22/21 15:58 DC 3/20/21 08:35





 


 Sodium Chloride


  (Saline Lock


 Flush)  10 ml  ASDIRECTED  PRN


 IV


 SEE LABEL COMMENTS  3/27/21 16:30


     





 


 Sodium Chloride


  (Saline Lock


 Flush)  10 ml  SLF


 IV


   3/27/21 22:00


    3/30/21 13:21














Allergies


Coded Allergies:  


     No Known Drug Allergies (Verified  Allergy, Unknown, 3/5/21)











Jolanta Arias MD            Mar 30, 2021 14:35

## 2021-03-30 NOTE — IPN
PROGRESS NOTE



DATE:  03/30/2021



SUBJECTIVE: The patient is seen and examined at bedside. He reports he is doing

well. He denies any fever, chills, or chest pain. He does exhibit some

difficulty with his breathing on exertion and is unable to do a circuit around

the loop on the progressive care unit (PCU), but is able to ambulate adequately

with the help of a walker in the room. He otherwise denies any abdominal pain,

nausea, vomiting, constipation, or diarrhea. His pain is well-controlled. He

reports he is able to still urinate.  



OBJECTIVE: 

VITAL SIGNS: Afebrile overnight. Temperature of 96.6, pulse 74, respiratory

rate 18, blood pressure 130/91, saturating 93% on 2 liters nasal cannula. 

INTAKE AND OUTPUT: Intake total of 1320 mL, output of 136 in the last 24 hours

with one void that was not documented. 

GENERAL APPEARANCE: The patient is a thin, but well-appearing male who appears

stated age. Sitting upright in bed eating breakfast in no acute distress.

HEENT: Head is normocephalic, atraumatic. EOMI. No scleral icterus. Mucous

membranes are moist. 

NECK: No JVD or lymphadenopathy. Trachea is midline. 

CARDIOVASCULAR: Regular rate and rhythm. Normal S1, S2. No murmurs, gallops, or

rubs.

RESPIRATORY: Velcro-like crackles appreciated on the left and right side with

bronchophonus sound on the right side likely from chest tube. Otherwise, no

adventitious breath sounds appreciated. No wheezes or rhonchi.

ABDOMEN: Soft, nontender, and nondistended. Bowel sounds are present. No

hepatosplenomegaly. No CVA tenderness. 

EXTREMITIES: No pitting edema in bilateral upper or lower extremities. 

SKIN: Warm, dry, and perfusing well without rashes or lesions.

NEUROLOGIC: Cranial nerves 2 through 12 are intact. Sensation is intact. Gait

is stable.

PSYCHIATRIC: Awake, alert, and oriented x3 with appropriate mood and affect.



LABORATORY DATA: White blood cell count of 10.7, hemoglobin 13.6, hematocrit

43.9, platelet count 191,000. Sodium 137, potassium 4.1, chloride 97, bicarb

40, BUN 17, creatinine 0.55, glucose 93, calcium 8.4, total bilirubin 0.4, AST

17, ALT 39, alkaline phosphatase 81, total protein 5.3, albumin 2.5. 



IMAGING DATA: Chest x-ray with no marked change from the day prior. There

continues to be a very small slice in the right upper lobe that does not adhere

entirely to the chest wall, but it certainly does not look any larger. There is

no subcutaneous emphysema. No evidence of acute infiltrate. Costophrenic angles

are sharp. Heart borders are regular. Trachea is midline. 



ASSESSMENT:

1.  Postop day #8 status post talc pleurodesis, wedge resection, and bullectomy.

2.  Spontaneous pneumothorax right-sided, recurrent.

3.  Alveolopleural fistula now controlled. 

4.  Advanced interstitial lung disease clinically idiopathic pulmonary

    fibrosis. 

5.  Underlying chronic obstructive pulmonary disease (COPD). 

6.  Hypoxic polycythemia.

7.  Gastroesophageal reflux disease (GERD). 

8.  Diabetes. 



PLAN: We will continue with chest tube at -40 suction mmHg for another day,

then we will set it to water seal the following day, and then hopefully we will

be able to take it off. The patient is comfortable with this plan and it

appears that at least the top and the lateral portions of the scarring is

intact and the amount of air within the lung is minimal and not worsening at

this time. 



Dictated by Jerad Curran DO in conjunction with Cruz Hay M.D.

## 2021-03-30 NOTE — REP
INDICATION:

pneumothorax.



COMPARISON:

Comparison chest x-ray 03/29/2021.



TECHNIQUE:

Two views..



FINDINGS:

Two right-sided chest tubes remain in place near the apex.  There is a small sliver of

apical pleural air again noted essentially unchanged.  A right subclavian central

venous catheter remains in place unchanged.  Diffuse interstitial lung disease

persists essentially unchanged bilaterally.  Heart is enlarged also unchanged.



IMPRESSION:

Stable pleuroparenchymal findings.  Two right chest tubes remain in place..





<Electronically signed by Dmitri Adamson > 03/30/21 [id number]

## 2021-03-31 VITALS — SYSTOLIC BLOOD PRESSURE: 110 MMHG | DIASTOLIC BLOOD PRESSURE: 67 MMHG

## 2021-03-31 VITALS — SYSTOLIC BLOOD PRESSURE: 104 MMHG | DIASTOLIC BLOOD PRESSURE: 59 MMHG

## 2021-03-31 VITALS — DIASTOLIC BLOOD PRESSURE: 69 MMHG | SYSTOLIC BLOOD PRESSURE: 108 MMHG

## 2021-03-31 VITALS — SYSTOLIC BLOOD PRESSURE: 117 MMHG | DIASTOLIC BLOOD PRESSURE: 76 MMHG

## 2021-03-31 VITALS — SYSTOLIC BLOOD PRESSURE: 103 MMHG | DIASTOLIC BLOOD PRESSURE: 59 MMHG

## 2021-03-31 LAB
ALBUMIN SERPL BCG-MCNC: 2.4 GM/DL (ref 3.2–5.2)
ALT SERPL W P-5'-P-CCNC: 36 U/L (ref 12–78)
BILIRUB SERPL-MCNC: 0.4 MG/DL (ref 0.2–1)
BUN SERPL-MCNC: 24 MG/DL (ref 7–18)
CALCIUM SERPL-MCNC: 8.5 MG/DL (ref 8.8–10.2)
CHLORIDE SERPL-SCNC: 100 MEQ/L (ref 98–107)
CO2 SERPL-SCNC: 40 MEQ/L (ref 21–32)
CREAT SERPL-MCNC: 0.6 MG/DL (ref 0.7–1.3)
GFR SERPL CREATININE-BSD FRML MDRD: > 60 ML/MIN/{1.73_M2} (ref 49–?)
GLUCOSE SERPL-MCNC: 169 MG/DL (ref 70–100)
HCT VFR BLD AUTO: 44.8 % (ref 42–52)
HGB BLD-MCNC: 13.8 G/DL (ref 13.5–17.5)
MCH RBC QN AUTO: 29.1 PG (ref 27–33)
MCHC RBC AUTO-ENTMCNC: 30.8 G/DL (ref 32–36.5)
MCV RBC AUTO: 94.5 FL (ref 80–96)
PLATELET # BLD AUTO: 192 10^3/UL (ref 150–450)
POTASSIUM SERPL-SCNC: 4.4 MEQ/L (ref 3.5–5.1)
PROT SERPL-MCNC: 5.2 GM/DL (ref 6.4–8.2)
RBC # BLD AUTO: 4.74 10^6/UL (ref 4.3–6.1)
SODIUM SERPL-SCNC: 140 MEQ/L (ref 136–145)
WBC # BLD AUTO: 11.3 10^3/UL (ref 4–10)

## 2021-03-31 RX ADMIN — HYDROCODONE BITARTRATE AND ACETAMINOPHEN PRN TAB: 5; 325 TABLET ORAL at 09:44

## 2021-03-31 RX ADMIN — DEXTROSE MONOHYDRATE SCH MG: 50 INJECTION, SOLUTION INTRAVENOUS at 09:42

## 2021-03-31 RX ADMIN — MIDODRINE HYDROCHLORIDE SCH MG: 5 TABLET ORAL at 09:43

## 2021-03-31 RX ADMIN — SODIUM CHLORIDE SCH ML: 9 INJECTION, SOLUTION INTRAMUSCULAR; INTRAVENOUS; SUBCUTANEOUS at 22:14

## 2021-03-31 RX ADMIN — INSULIN LISPRO SCH UNITS: 100 INJECTION, SOLUTION INTRAVENOUS; SUBCUTANEOUS at 12:00

## 2021-03-31 RX ADMIN — SODIUM CHLORIDE SCH UNITS: 4.5 INJECTION, SOLUTION INTRAVENOUS at 22:10

## 2021-03-31 RX ADMIN — INSULIN DETEMIR SCH UNITS: 100 INJECTION, SOLUTION SUBCUTANEOUS at 09:45

## 2021-03-31 RX ADMIN — LEVALBUTEROL HYDROCHLORIDE SCH MG: 1.25 SOLUTION, CONCENTRATE RESPIRATORY (INHALATION) at 13:15

## 2021-03-31 RX ADMIN — INSULIN LISPRO SCH UNITS: 100 INJECTION, SOLUTION INTRAVENOUS; SUBCUTANEOUS at 16:37

## 2021-03-31 RX ADMIN — SODIUM CHLORIDE SCH UNITS: 4.5 INJECTION, SOLUTION INTRAVENOUS at 09:43

## 2021-03-31 RX ADMIN — Medication SCH UNITS: at 12:04

## 2021-03-31 RX ADMIN — MIDODRINE HYDROCHLORIDE SCH MG: 5 TABLET ORAL at 12:04

## 2021-03-31 RX ADMIN — MIDODRINE HYDROCHLORIDE SCH MG: 5 TABLET ORAL at 16:36

## 2021-03-31 RX ADMIN — Medication SCH UNITS: at 22:13

## 2021-03-31 RX ADMIN — HYDROXYCHLOROQUINE SULFATE SCH MG: 200 TABLET, FILM COATED ENTERAL at 22:10

## 2021-03-31 RX ADMIN — DOCUSATE SODIUM SCH MG: 100 CAPSULE, LIQUID FILLED ORAL at 09:43

## 2021-03-31 RX ADMIN — SODIUM CHLORIDE SCH ML: 9 INJECTION, SOLUTION INTRAMUSCULAR; INTRAVENOUS; SUBCUTANEOUS at 05:26

## 2021-03-31 RX ADMIN — INSULIN DETEMIR SCH UNITS: 100 INJECTION, SOLUTION SUBCUTANEOUS at 22:10

## 2021-03-31 RX ADMIN — LEVALBUTEROL HYDROCHLORIDE SCH MG: 1.25 SOLUTION, CONCENTRATE RESPIRATORY (INHALATION) at 07:09

## 2021-03-31 RX ADMIN — MAGNESIUM HYDROXIDE SCH ML: 400 SUSPENSION ORAL at 09:00

## 2021-03-31 RX ADMIN — ATOVAQUONE SCH MG: 750 SUSPENSION ORAL at 09:44

## 2021-03-31 RX ADMIN — SODIUM CHLORIDE, PRESERVATIVE FREE SCH ML: 5 INJECTION INTRAVENOUS at 22:14

## 2021-03-31 RX ADMIN — LEVALBUTEROL HYDROCHLORIDE SCH MG: 1.25 SOLUTION, CONCENTRATE RESPIRATORY (INHALATION) at 19:36

## 2021-03-31 RX ADMIN — DOCUSATE SODIUM SCH MG: 100 CAPSULE, LIQUID FILLED ORAL at 21:00

## 2021-03-31 RX ADMIN — SODIUM CHLORIDE SCH ML: 9 INJECTION, SOLUTION INTRAMUSCULAR; INTRAVENOUS; SUBCUTANEOUS at 12:05

## 2021-03-31 RX ADMIN — INSULIN LISPRO SCH UNITS: 100 INJECTION, SOLUTION INTRAVENOUS; SUBCUTANEOUS at 07:30

## 2021-03-31 RX ADMIN — INSULIN LISPRO SCH UNITS: 100 INJECTION, SOLUTION INTRAVENOUS; SUBCUTANEOUS at 22:10

## 2021-03-31 RX ADMIN — LEVALBUTEROL HYDROCHLORIDE SCH MG: 1.25 SOLUTION, CONCENTRATE RESPIRATORY (INHALATION) at 02:40

## 2021-03-31 RX ADMIN — SODIUM CHLORIDE, PRESERVATIVE FREE SCH ML: 5 INJECTION INTRAVENOUS at 05:26

## 2021-03-31 RX ADMIN — LEVALBUTEROL HYDROCHLORIDE SCH MG: 1.25 SOLUTION, CONCENTRATE RESPIRATORY (INHALATION) at 13:00

## 2021-03-31 RX ADMIN — Medication SCH UNITS: at 05:26

## 2021-03-31 RX ADMIN — SODIUM CHLORIDE, PRESERVATIVE FREE SCH ML: 5 INJECTION INTRAVENOUS at 12:04

## 2021-03-31 RX ADMIN — HYDROXYCHLOROQUINE SULFATE SCH MG: 200 TABLET, FILM COATED ENTERAL at 09:44

## 2021-03-31 NOTE — IPN
PROGRESS NOTE



DATE:  2021



SUBJECTIVE: Patient is seen and examined at bedside.  He notes no acute events

overnight.  He continues to attempt to walk around but is held back due to his

shortness of breath on exertion specifically.  This is baseline for him.  He

denies any fever, chills, chest pain, difficulty breathing, abdominal pain,

nausea, vomiting, constipation, diarrhea. 



OBJECTIVE: 

Vital signs:  Temperature 97.8, pulse 62 and regular, respiratory rate of 22,

blood pressure 117/76, saturating 98% on 2 liters nasal cannula.

Intake and output:  1230 mL of intake with 890 mL of output, 800 from urine, 90

from chest tube with no air leak.



PHYSICAL EXAMINATION:

General:  Patient is a thin-appearing male sitting upright in bed eating

breakfast in no acute distress.

HEENT:  Head is normocephalic, atraumatic.  Extraocular movements intact.  No

scleral icterus.  Mucous membranes are moist.

Neck:  No jugular venous distension (JVD) or lymphadenopathy.  Trachea is

midline.  

Cardiovascular:  Regular rate and rhythm, normal S1 and S2.  No murmurs,

gallops, or rubs.

Respiratory:  Ongoing Velcro-like crackles bilaterally with more bronchophonous

sounds appreciated on the right, otherwise no adventitious breath sounds, no

wheezes or rhonchi.

Abdomen:  Soft, nontender, nondistended.  Bowel sounds present.  No

hepatosplenomegaly.  No costovertebral angle (CVA) tenderness.  

Extremities:  No pitting edema in bilateral upper or lower extremities.

Skin:  Warm and dry without rashes or lesions.

Neurologic:  Cranial nerves II-XII are intact.  Sensation is intact.  Gait is

stable.

Psychiatric:  Awake, alert, oriented times three with appropriate mood and

affect.



LABORATORY DATA:  White blood cell count of 11.3, hemoglobin 13.8, hematocrit

44.8, platelet count of 192.  Sodium 140, potassium 4.4, chloride 100,

bicarbonate 40, BUN 24, creatinine 0.6, glucose 169, calcium 8.5, total

bilirubin 0.4, AST of 20, ALT of 36, alkaline phosphatase of 92, total protein

of 5.2, albumin 2.4.



IMAGIN-D chest x-ray:  The right upper lobe small air space that has been

documented on prior imaging appears to be gone today.  Pleura extends out to

the chest wall.  No signs of subcutaneous emphysema.  Costophrenic angles are

sharp bilaterally.  Heart borders are regular.  Trachea is midline.  No signs

of acute infiltrate.



ASSESSMENT: 

1.  Postoperative day #9 status post talc pleurodesis, wedge resection, and

bullectomy.

2.  Spontaneous pneumothorax right-sided and recurrent.

3.  Alveolar pleural fistula, now controlled.

4.  Advanced interstitial lung disease, clinically idiopathic pulmonary

fibrosis.

5.  Underlying chronic obstructive pulmonary disease (COPD).

6.  Hypoxic polycythemia.

7.  Gastroesophageal reflux disease (GERD).

8.  Diabetes.



PLAN:  Turning down chest tube to -20 suction today.  We will have it go to

water seal tomorrow and then hopefully we will be able to take it off.  Patient

is comfortable with this plan moving forward and is eager to see the results.



My faculty preceptor for this patient encounter was physically present during

the encounter and was fully available.  All aspects of the patient interview,

examination, medical decision making process, and medical care plan development

were reviewed and approved by the faculty preceptor. The faculty preceptor is

aware and concurs with the plan as stated in the body of this note and will

attest to such by his/her co-signature.





Attending note:  I have asked Dr. Arias of the hospitalist service to decrease 
his steroids.  As noted before, the steroids are inhibiting the inflammatory 
response and the scarring of the pleura to the chest wall.  -- ROSA Hay MD

[facility]

## 2021-03-31 NOTE — IPNPDOC
Date Seen


The patient was seen on 3/31/21.





Progress Note


SUBJECTIVE: 


Chest tube to 20 cm suction, decreased steroids today. CXR slightly improved. 

Remains on 2 L NC. Denies incr chest pain, shortness of breath, n/v/d. 





OBJECTIVE: 





PHYSICAL EXAM: 


VITAL SIGNS: Please see below 


GENERAL: In NAD, resting in bed


LUNGS: Improved breath sounds b/l, crackles mild b/l bases. 


CVS: S1 and S2, sinus rhythm. No murmurs, rubs or gallops.


GI:  Soft, nontender and nondistended. Positive bowel sounds.


CHEST: Right subclavian line 


BACK: chest tube right side 


EXTREMITIES: No cyanosis, clubbing or pitting edema. 


: Deferred 


NEURO: CN 2-12 intact, no focal deficits. 


PSYCH: Mood and affect appropriate 





LABORATORY DATA: Please see below 





MICROBIOLOGY: Please see below 





IMAGING: 





CXR today: Unchanged from prior 





CXR 3/25/21: 


There is no significant interval change.





CXR 3/24/21: 


Findings essentially unchanged.  Two right chest tubes remain in place at the 

apex.





CXR 3/23/21: 


The subcutaneous emphysema along the right lateral chest wall has decreased. 

Question fracture tip of the right scapular versus superimposition artifact. No 

other interval change.





ASSESSMENT AND PLAN:  This is a 65-year-old male with a history of COPD, 

idiopathic pulmonary fibrosis, SLE managed by his rheumatologist, pulmonologist 

as an outpatient with Cytoxan planned for the future and chronic Plaquenil and 

prednisone, presented to the ER with shortness of breath, found to have 

spontaneous pneumothorax, status post chest tube on 03/05 to 03/08 with


recurrent pneumothorax, reinserted on 03/10, status post blood patch 03/17 and 

03/19/2021. Admitted for persistent right sided spontaneous pneumothorax , POD 7

talc pleurodesis  03/22/21. 





PLAN: 





  1.  Spontaneous pneumothorax on the right with recurrence, status post blood 

patch on 03/17 and 03/19, POD 7 talc pleurodesis on 03/22.


  2.  COPD


  3.  Type 2 diabetes


  4.  History of idiopathic pulmonary fibrosis, SLE on chronic prednisone and 

Plaquenil. 


  5.  GERD


  6.  Alveolar pleural fistula.


  7.  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


  8.  Thrombocytopenia, improving with negative Heparin induced thrombocytopenia

panel.





DVT px: Heparin SC 





PLAN: 20 cm suction , decreased steroids further. Thoracic surgery following 

closely. Other chronic issues stable. Home when medically improved.





VS, I&O, 24H, Fishbone


Vital Signs/I&O





Vital Signs








  Date Time  Temp Pulse Resp B/P (MAP) Pulse Ox O2 Delivery O2 Flow Rate FiO2


 


3/31/21 11:56 98.0 63 20 108/69 (82) 99 Nasal Cannula 2.0 














I&O- Last 24 Hours up to 6 AM 


 


 3/31/21





 06:00


 


Intake Total 990 ml


 


Output Total 575 ml


 


Balance 415 ml











Laboratory Data


24H LABS


Laboratory Tests 2


3/30/21 16:28: Bedside Glucose (Misc Panel) 355H


3/30/21 19:58: Bedside Glucose (Misc Panel) 295H


3/31/21 05:15: 


Nucleated Red Blood Cells % (auto) 0.0, Anion Gap 0L, Glomerular Filtration Rate

> 60.0, Calcium Level 8.5L, Total Bilirubin 0.4, Aspartate Amino Transf 

(AST/SGOT) 20, Alanine Aminotransferase (ALT/SGPT) 36, Alkaline Phosphatase 92, 

Total Protein 5.2L, Albumin 2.4L, Albumin/Globulin Ratio 0.9


3/31/21 11:59: Bedside Glucose (Misc Panel) 55L


CBC/BMP


Laboratory Tests


3/31/21 05:15











Current Medications





Current Medications








 Medications


  (Trade)  Dose


 Ordered  Sig/Sadie


 Route


 PRN Reason  Start Time


 Stop Time Status Last Admin


Dose Admin


 


 Acetaminophen


  (Tylenol Tab)  650 mg  Q6HP  PRN


 PO


 T > 101.5 or HA  3/22/21 16:00


     





 


 Acetaminophen


  (Tylenol Tab)  650 mg  Q6HP  PRN


 PO


 T > 101.5 or HA  3/5/21 15:25


   Cancel  





 


 Acetaminophen/


 Hydrocodone Bitart


  (Norco, Anexsia


 5/325)  1 tab  Q3H  PRN


 PO


 MILD PAIN (PS 1-4)  3/22/21 16:00


    3/31/21 09:44





 


 Acetaminophen/


 Hydrocodone Bitart


  (Norco, Anexsia


 5/325)  1 tab  Q3H  PRN


 PO


 MILD PAIN (PS 1-4)  3/5/21 15:25


 3/22/21 15:58 DC 3/15/21 20:26





 


 Atovaquone


  (Mepron 750mg/


 5ml Suspension)  1,500 mg  DAILY


 PO


   3/6/21 09:00


    3/31/21 09:44





 


 Bisacodyl


  (Dulcolax


 Suppository)  10 mg  Q4HP  PRN


 OH


 CONSTIPATION  3/22/21 16:00


     





 


 Bisacodyl


  (Dulcolax


 Suppository)  10 mg  Q4HP  PRN


 OH


 CONSTIPATION  3/5/21 15:25


   Cancel  





 


 Cefazolin Sodium


 1 gm/Dextrose  50 ml @ 


 100 mls/hr  Q8H


 IV


   3/22/21 21:00


 3/24/21 13:29 DC 3/24/21 12:38





 


 Dextrose


  (Dextrose 50%)  25 ml  ASDIRECTED  PRN


 IV


 SEE LABEL COMMENTS  3/5/21 17:35


   Cancel  





 


 Dextrose/Sodium


 Chloride  1,000 ml @ 


 75 mls/hr  [id number]


 IV


   3/5/21 17:10


 3/5/21 20:13 DC 3/5/21 17:32





 


 Diphenhydramine


 HCl


  (Benadryl)  12.5 mg  Q4HP  PRN


 IV


 ITCHING  3/22/21 14:40


   Cancel  





 


 Docusate Sodium


  (Colace)  100 mg  BID


 PO


   3/22/21 21:00


    3/31/21 09:43





 


 Docusate Sodium


  (Colace)  100 mg  BID


 PO


   3/5/21 21:00


 3/22/21 15:58 DC 3/20/21 08:33





 


 Fentanyl Citrate


  (Sublimaze)  25 mcg  Q5MP  PRN


 IV


 PAIN LEVEL 5-10  3/22/21 17:30


 3/22/21 18:30 DC  





 


 Fentanyl/


 Bupivacaine HCl  250 ml @ 4


 mls/hr  Q24H


 EPIDURAL


   3/22/21 14:40


 3/25/21 15:56 DC 3/24/21 17:23





 


 Glucagon


  (Glucagon)  1 mg  ASDIRECTED  PRN


 SC


 SEE LABEL COMMENTS  3/5/21 17:35


   Cancel  





 


 Glucose


  (Glucose)  16 GM  ASDIRECTED  PRN


 PO


 SEE LABEL COMMENTS  3/5/21 17:35


   Cancel  





 


 Heparin Sodium


  (Heparin Lock


 Flush 10units/ml)  10 units  ASDIRECTED  PRN


 IV


 SEE LABEL COMMENTS  3/17/21 05:10


 3/22/21 15:58 DC 3/20/21 08:34





 


 Heparin Sodium


  (Heparin Lock


 Flush 10units/ml)  10 units  ASDIRECTED  PRN


 IV


 SEE LABEL COMMENTS  3/27/21 16:30


     





 


 Heparin Sodium


  (Heparin Lock


 Flush 10units/ml)  10 units  HLF


 IV


   3/27/21 22:00


    3/31/21 12:04





 


 Heparin Sodium


  (Porcine)


  (Heparin)  5,000 units  Q12H


 SC


   3/22/21 21:00


 3/23/21 07:45 DC  





 


 Heparin Sodium


  (Porcine)


  (Heparin)  5,000 units  Q12H


 SC


   3/5/21 21:00


    3/31/21 09:43





 


 Home Med


  (Med Rec


 Complete!)    ASDIRECTED


 XX


   3/5/21 16:15


 3/5/21 16:15 DC  





 


 Hydromorphone HCl


  (Dilaudid)  0.2 mg  Q5MP  PRN


 IV


 PAIN LEVEL 4-7  3/22/21 17:30


 3/22/21 18:30 DC  





 


 Hydroxychloroquine


 Sulfate


  (Plaquenil)  200 mg  BID


 PO


   3/5/21 21:00


    3/31/21 09:44





 


 Insulin Detemir


  (Levemir Insulin)  6 units  BID


 SC


   3/18/21 09:00


 3/22/21 08:46 DC 3/21/21 21:57





 


 Insulin Detemir


  (Levemir Insulin)  6 units  QHS


 SC


   3/8/21 21:00


 3/9/21 19:23 DC 3/8/21 20:57





 


 Insulin Detemir


  (Levemir Insulin)  8 units  QHS


 SC


   3/25/21 21:00


    3/30/21 21:49





 


 Insulin Detemir


  (Levemir Insulin)  8 units  QHS


 SC


   3/9/21 21:00


 3/18/21 07:46 DC 3/17/21 21:09





 


 Insulin Detemir


  (Levemir Insulin)  12 units  BID


 SC


   3/22/21 21:00


 3/24/21 05:36 DC 3/23/21 22:02





 


 Insulin Detemir


  (Levemir Insulin)  15 units  QHS


 SC


   3/24/21 21:00


 3/25/21 10:46 DC 3/24/21 20:42





 


 Insulin Detemir


  (Levemir Insulin)  18 units  QAM


 SC


   3/24/21 09:00


 3/27/21 08:01 DC 3/24/21 09:09





 


 Insulin Detemir


  (Levemir Insulin)  25 units  QAM


 SC


   3/27/21 09:00


 3/30/21 08:03 DC  





 


 Insulin Detemir


  (Levemir Insulin)  35 units  QAM


 SC


   3/30/21 09:00


    3/31/21 09:45





 


 Insulin Human


 Lispro


  (HumaLOG INSULIN)  SEE


 PROTOCOL


 TABLE  AC


 SC


   3/6/21 07:30


    3/31/21 07:30





 


 Insulin Human


 Lispro


  (HumaLOG INSULIN)  SEE


 PROTOCOL


 TABLE  QHS


 SC


   3/5/21 21:00


    3/30/21 21:49





 


 Ketorolac


 Tromethamine


  (ToRADol)  15 mg  Q6H


 IV


   3/5/21 18:00


 3/10/21 17:59 DC 3/10/21 12:53





 


 Ketorolac


 Tromethamine


  (ToRADol)  30 mg  Q6H


 IV


   3/22/21 17:00


 3/27/21 16:59 DC 3/27/21 11:33





 


 Levalbuterol HCl


  (Xopenex Neb)  1.25 mg  Q2HP  PRN


 NEB


 WHEEZING  3/5/21 15:25


     





 


 Levalbuterol HCl


  (Xopenex Neb)  1.25 mg  RQ6H


 NEB


   3/5/21 20:00


    3/31/21 13:15





 


 Lidocaine HCl


  (LIDOCAINE 1%


 MDV 20ml)  40 ml  STAT  STAT


 SC


   3/10/21 12:39


 3/10/21 12:41 DC 3/10/21 11:54





 


 Lidocaine HCl


  (Lmx 4/Anecream)  1 dose  Q8HP  PRN


 TOP


 PAIN  3/10/21 21:50


    3/10/21 23:45





 


 Magnesium


 Hydroxide


  (Milk Of


 Magnesia)  30 ml  DAILY


 PO


   3/23/21 09:00


     





 


 Magnesium


 Hydroxide


  (Milk Of


 Magnesia)  30 ml  DAILY


 PO


   3/6/21 09:00


 3/22/21 15:58 DC 3/10/21 09:04





 


 Metoclopramide HCl


  (REGLAN


 INJection)  10 mg  Q6HP  PRN


 IV


 NAUSEA  3/22/21 14:40


   Cancel  





 


 Midazolam HCl


  (Versed)  6 mg  ASDIRECTED  STAT


 IV


   3/10/21 12:39


 3/10/21 12:41 DC 3/10/21 11:50





 


 Midodrine


  (Proamatine)  5 mg  08,12,16


 PO


   3/19/21 13:00


    3/31/21 12:04





 


 Miscellaneous


  (Unresolved


 Clarification


 Entry)  SEE LABEL


 COMMENTS  DAILY


 XX


   3/11/21 09:00


 3/11/21 14:46 DC  





 


 Naloxone HCl


  (Narcan)  0.1 mg  Q5MP  PRN


 IV


 SEE LABEL COMMENTS  3/22/21 14:40


   Cancel  





 


 Non-Formulary


 Medication


  (Epidural/PCA


 Keys)  1 each  ASDIRECTED  PRN


 XX


 SEE LABEL COMMENTS  3/22/21 14:40


   Cancel  





 


 Non-Formulary


 Medication


  (Keys)    ASDIRECTED  PRN


 XX


 SEE LABEL COMMENTS  3/22/21 14:40


   Cancel  





 


 Ondansetron HCl


  (ZOFRAN


 INJection)  4 mg  Q4HP  PRN


 IV


 NAUSEA  3/22/21 16:00


     





 


 Ondansetron HCl


  (ZOFRAN


 INJection)  4 mg  Q4HP  PRN


 IV


 NAUSEA OR VOMITING  3/22/21 17:30


 3/22/21 18:30 DC  





 


 Ondansetron HCl


  (ZOFRAN


 INJection)  4 mg  Q4HP  PRN


 IV


 NAUSEA  3/5/21 15:25


   Cancel  





 


 Ondansetron HCl


  (ZOFRAN


 INJection)  4 mg  Q6HP  PRN


 IV


 REFRACTORY NAUSEA  3/22/21 14:40


   Cancel  





 


 Oxycodone HCl


  (Roxicodone,


 Oxyir)  5 mg  ASDIRECTED  PRN


 PO


 PAIN LEVEL 1-4  3/22/21 17:30


 3/22/21 18:30 DC  





 


 Oxycodone/


 Acetaminophen


  (Percocet 5mg/


 325mg Tablet)  1 tab  Q4H  PRN


 PO


 MODERATE PAIN (PS 5-7)  3/22/21 16:00


    3/30/21 23:26





 


 Oxycodone/


 Acetaminophen


  (Percocet 5mg/


 325mg Tablet)  1 tab  Q4H  PRN


 PO


 MODERATE PAIN (PS 5-7)  3/5/21 15:25


 3/22/21 15:58 DC 3/19/21 21:59





 


 Oxycodone/


 Acetaminophen


  (Percocet 5mg/


 325mg Tablet)  2 tab  Q4H  PRN


 PO


 SEVERE PAIN (PS 8-10)  3/22/21 16:00


     





 


 Oxycodone/


 Acetaminophen


  (Percocet 5mg/


 325mg Tablet)  2 tab  Q4H  PRN


 PO


 SEVERE PAIN (PS 8-10)  3/5/21 15:25


 3/22/21 15:58 DC 3/12/21 22:59





 


 Pantoprazole


 Sodium


  (Protonix)  40 mg  DAILY


 IV


   3/23/21 09:00


    3/31/21 09:42





 


 Pantoprazole


 Sodium


  (Protonix)  40 mg  DAILY


 PO


   3/23/21 09:00


 3/23/21 07:46 DC  





 


 Pantoprazole


 Sodium


  (Protonix)  40 mg  DAILY


 PO


   3/6/21 09:00


 3/22/21 15:58 DC 3/22/21 09:18





 


 Phenylephrine HCl


 50 mg/Dextrose  500 ml @ 


 7.2 mls/hr  Q24H


 IV


   3/22/21 21:05


 3/24/21 17:39 DC 3/22/21 23:24





 


 Phenylephrine HCl


 50 mg/Dextrose  500 ml @ 


 15 mls/hr  Q24H


 IV


   3/23/21 18:21


   UNV  





 


 Potassium


 Chloride/Dextrose/


 Sod Cl  1,000 ml @ 


 75 mls/hr  [id number]


 IV


   3/22/21 15:58


 3/24/21 09:26 DC 3/23/21 17:59





 


 Potassium


 Chloride/Dextrose/


 Sod Cl  1,000 ml @ 


 75 mls/hr  [id number]


 IV


   3/5/21 15:25


 3/5/21 17:11 DC  





 


 Prednisone


  (Deltasone)  40 mg  DAILY


 PO


   4/1/21 09:00


     





 


 Prednisone


  (Deltasone)  60 mg  DAILY


 PO


   3/6/21 09:00


 3/31/21 09:57 DC 3/31/21 09:43





 


 Sodium Chloride  1,000 ml @ 


 100 mls/hr  Q10H


 IV


   3/24/21 12:25


 3/24/21 17:37 DC 3/24/21 12:35





 


 Sodium Chloride


  (Saline Lock


 Flush)  2 ml  ASDIRECTED  PRN


 IV


 SEE LABEL COMMENTS  3/15/21 08:00


     





 


 Sodium Chloride


  (Saline Lock


 Flush)  2 ml  SLF


 IV


   3/15/21 14:00


    3/31/21 12:04





 


 Sodium Chloride


  (Saline Lock


 Flush)  10 ml  ASDIRECTED  PRN


 IV


 SEE LABEL COMMENTS  3/17/21 05:10


 3/22/21 15:58 DC 3/20/21 08:35





 


 Sodium Chloride


  (Saline Lock


 Flush)  10 ml  ASDIRECTED  PRN


 IV


 SEE LABEL COMMENTS  3/27/21 16:30


     





 


 Sodium Chloride


  (Saline Lock


 Flush)  10 ml  SLF


 IV


   3/27/21 22:00


    3/31/21 12:05














Allergies


Coded Allergies:  


     No Known Drug Allergies (Verified  Allergy, Unknown, 3/5/21)











Jolanta Arias MD            Mar 31, 2021 14:57

## 2021-03-31 NOTE — REP
INDICATION:

pneumothorax



COMPARISON:

03/30/2021



TECHNIQUE:

PA and lateral.



FINDINGS:

Two right-sided chest tubes are again identified in stable position with a small

residual right apical pneumothorax noted.



Lung fields demonstrate diffuse bilateral chronic appearing pulmonary parenchymal

changes similar to multiple prior examinations.  Superimposed airspace disease cannot

be excluded.  No obvious effusion.



Mediastinum and cardiac silhouette stable skeletal structures stable.



IMPRESSION:

1. No significant change from prior examination with continued small right apical

pneumothorax.

2. Lung fields again demonstrate diffuse chronic parenchymal changes.  Superimposed

acute process cannot be excluded.







<Electronically signed by Nixon Rojas > 03/31/21 [id number]

## 2021-04-01 VITALS — SYSTOLIC BLOOD PRESSURE: 131 MMHG | DIASTOLIC BLOOD PRESSURE: 78 MMHG

## 2021-04-01 VITALS — SYSTOLIC BLOOD PRESSURE: 121 MMHG | DIASTOLIC BLOOD PRESSURE: 77 MMHG

## 2021-04-01 VITALS — SYSTOLIC BLOOD PRESSURE: 113 MMHG | DIASTOLIC BLOOD PRESSURE: 72 MMHG

## 2021-04-01 VITALS — DIASTOLIC BLOOD PRESSURE: 57 MMHG | SYSTOLIC BLOOD PRESSURE: 110 MMHG

## 2021-04-01 VITALS — DIASTOLIC BLOOD PRESSURE: 64 MMHG | SYSTOLIC BLOOD PRESSURE: 104 MMHG

## 2021-04-01 VITALS — SYSTOLIC BLOOD PRESSURE: 106 MMHG | DIASTOLIC BLOOD PRESSURE: 61 MMHG

## 2021-04-01 LAB
ALBUMIN SERPL BCG-MCNC: 2.3 GM/DL (ref 3.2–5.2)
ALT SERPL W P-5'-P-CCNC: 33 U/L (ref 12–78)
BILIRUB SERPL-MCNC: 0.3 MG/DL (ref 0.2–1)
BUN SERPL-MCNC: 21 MG/DL (ref 7–18)
CALCIUM SERPL-MCNC: 7.7 MG/DL (ref 8.8–10.2)
CHLORIDE SERPL-SCNC: 101 MEQ/L (ref 98–107)
CO2 SERPL-SCNC: 40 MEQ/L (ref 21–32)
CREAT SERPL-MCNC: 0.45 MG/DL (ref 0.7–1.3)
GFR SERPL CREATININE-BSD FRML MDRD: > 60 ML/MIN/{1.73_M2} (ref 49–?)
GLUCOSE SERPL-MCNC: 57 MG/DL (ref 70–100)
HCT VFR BLD AUTO: 42.2 % (ref 42–52)
HGB BLD-MCNC: 13.1 G/DL (ref 13.5–17.5)
MCH RBC QN AUTO: 29.3 PG (ref 27–33)
MCHC RBC AUTO-ENTMCNC: 31 G/DL (ref 32–36.5)
MCV RBC AUTO: 94.4 FL (ref 80–96)
PLATELET # BLD AUTO: 177 10^3/UL (ref 150–450)
POTASSIUM SERPL-SCNC: 3.9 MEQ/L (ref 3.5–5.1)
PROT SERPL-MCNC: 4.6 GM/DL (ref 6.4–8.2)
RBC # BLD AUTO: 4.47 10^6/UL (ref 4.3–6.1)
SODIUM SERPL-SCNC: 139 MEQ/L (ref 136–145)
WBC # BLD AUTO: 11.6 10^3/UL (ref 4–10)

## 2021-04-01 RX ADMIN — DOCUSATE SODIUM SCH MG: 100 CAPSULE, LIQUID FILLED ORAL at 21:00

## 2021-04-01 RX ADMIN — Medication SCH UNITS: at 21:23

## 2021-04-01 RX ADMIN — DOCUSATE SODIUM SCH MG: 100 CAPSULE, LIQUID FILLED ORAL at 08:33

## 2021-04-01 RX ADMIN — MIDODRINE HYDROCHLORIDE SCH MG: 5 TABLET ORAL at 12:32

## 2021-04-01 RX ADMIN — LEVALBUTEROL HYDROCHLORIDE SCH MG: 1.25 SOLUTION, CONCENTRATE RESPIRATORY (INHALATION) at 08:31

## 2021-04-01 RX ADMIN — DEXTROSE MONOHYDRATE SCH MG: 50 INJECTION, SOLUTION INTRAVENOUS at 08:34

## 2021-04-01 RX ADMIN — MIDODRINE HYDROCHLORIDE SCH MG: 5 TABLET ORAL at 16:59

## 2021-04-01 RX ADMIN — Medication SCH UNITS: at 12:33

## 2021-04-01 RX ADMIN — INSULIN LISPRO SCH UNITS: 100 INJECTION, SOLUTION INTRAVENOUS; SUBCUTANEOUS at 20:38

## 2021-04-01 RX ADMIN — SODIUM CHLORIDE SCH UNITS: 4.5 INJECTION, SOLUTION INTRAVENOUS at 20:37

## 2021-04-01 RX ADMIN — HYDROXYCHLOROQUINE SULFATE SCH MG: 200 TABLET, FILM COATED ENTERAL at 08:33

## 2021-04-01 RX ADMIN — SODIUM CHLORIDE SCH ML: 9 INJECTION, SOLUTION INTRAMUSCULAR; INTRAVENOUS; SUBCUTANEOUS at 05:28

## 2021-04-01 RX ADMIN — INSULIN LISPRO SCH UNITS: 100 INJECTION, SOLUTION INTRAVENOUS; SUBCUTANEOUS at 12:33

## 2021-04-01 RX ADMIN — HYDROXYCHLOROQUINE SULFATE SCH MG: 200 TABLET, FILM COATED ENTERAL at 20:37

## 2021-04-01 RX ADMIN — INSULIN LISPRO SCH UNITS: 100 INJECTION, SOLUTION INTRAVENOUS; SUBCUTANEOUS at 07:30

## 2021-04-01 RX ADMIN — LEVALBUTEROL HYDROCHLORIDE SCH MG: 1.25 SOLUTION, CONCENTRATE RESPIRATORY (INHALATION) at 02:00

## 2021-04-01 RX ADMIN — SODIUM CHLORIDE SCH ML: 9 INJECTION, SOLUTION INTRAMUSCULAR; INTRAVENOUS; SUBCUTANEOUS at 21:24

## 2021-04-01 RX ADMIN — ATOVAQUONE SCH MG: 750 SUSPENSION ORAL at 08:32

## 2021-04-01 RX ADMIN — INSULIN LISPRO SCH UNITS: 100 INJECTION, SOLUTION INTRAVENOUS; SUBCUTANEOUS at 17:00

## 2021-04-01 RX ADMIN — LEVALBUTEROL HYDROCHLORIDE SCH MG: 1.25 SOLUTION, CONCENTRATE RESPIRATORY (INHALATION) at 14:59

## 2021-04-01 RX ADMIN — SODIUM CHLORIDE, PRESERVATIVE FREE SCH ML: 5 INJECTION INTRAVENOUS at 12:33

## 2021-04-01 RX ADMIN — SODIUM CHLORIDE, PRESERVATIVE FREE SCH ML: 5 INJECTION INTRAVENOUS at 05:28

## 2021-04-01 RX ADMIN — SODIUM CHLORIDE SCH ML: 9 INJECTION, SOLUTION INTRAMUSCULAR; INTRAVENOUS; SUBCUTANEOUS at 12:33

## 2021-04-01 RX ADMIN — INSULIN DETEMIR SCH UNITS: 100 INJECTION, SOLUTION SUBCUTANEOUS at 08:34

## 2021-04-01 RX ADMIN — MAGNESIUM HYDROXIDE SCH ML: 400 SUSPENSION ORAL at 08:33

## 2021-04-01 RX ADMIN — MIDODRINE HYDROCHLORIDE SCH MG: 5 TABLET ORAL at 08:33

## 2021-04-01 RX ADMIN — LEVALBUTEROL HYDROCHLORIDE SCH MG: 1.25 SOLUTION, CONCENTRATE RESPIRATORY (INHALATION) at 20:02

## 2021-04-01 RX ADMIN — SODIUM CHLORIDE, PRESERVATIVE FREE SCH ML: 5 INJECTION INTRAVENOUS at 21:23

## 2021-04-01 RX ADMIN — Medication SCH UNITS: at 05:27

## 2021-04-01 RX ADMIN — SODIUM CHLORIDE SCH UNITS: 4.5 INJECTION, SOLUTION INTRAVENOUS at 08:34

## 2021-04-01 NOTE — IPN
PROGRESS NOTE



DATE:  04/01/2021



SUBJECTIVE: Patient is seen and examined this morning at bedside. No acute

events overnight. He denies any fevers, chills, chest pain, shortness of

breath, abdominal pain, nausea, vomiting, constipation or diarrhea. He

continues to work with Physical Therapy and sometimes will take walks along the

[place]. He tolerated his new chest tube suction setting well overnight. He

continues to be weaned on steroids by the primary team. 



OBJECTIVE: 

VITAL SIGNS:  Afebrile overnight. Temperature is 97.9, pulse is 60 and regular,

respiratory rate is 22, blood pressure is 113/72, saturating 99% on 2 liters

nasal cannula. 

INPUT AND OUTPUT: 505 ml of intake with 85 ml of output in the last 24 hours

with five voids and 85 ml drained from chest tube. I am going to assume that

these voids were not measured. 

GENERAL: Patient is a thin appearing male sitting upright in bed in no acute

distress speaking in complete sentences.

HEENT: Head is normocephalic. Atraumatic. EOMI. No scleral icterus. Mucous

membranes are moist.

NECK: No lymphadenopathy or JVD. Trachea is midline. 

CARDIOVASCULAR: Regular rate and rhythm. Normal S1 and S2. No murmurs, rubs or

gallops. 

RESPIRATORY: Velcro-like crackles appreciated bilaterally throughout the lungs.

No crackles appreciated at the bases of bilateral lungs, bronchophonous breath

sounds appreciated on the right. No wheezes or rhonchi. 

ABDOMEN: Soft, nontender and nondistended. No hepatosplenomegaly. Bowel sounds

are present. 

EXTREMITIES: No edema. 

SKIN: Warm, dry and perfusing well without rashes or lesions. 

NEUROLOGIC: Cranial nerves II-XII are intact.  Sensation is intact. Gait is

stable. 

PSYCH: Patient is awake, alert and oriented x3 with appropriate mood and

affect. 



LABORATORY DATA:  White blood cell count of 11.6, hemoglobin is 13.1,

hematocrit is 42.2, platelet count of 177,000. Sodium 139, potassium is 3.9,

chloride 101, bicarbonate of 40. BUN 21, creatinine 0.45. Glucose of 57.

Calcium of 7.7. 



IMAGING:   Chest x-ray showed minimal separation about 5.78 mm in the right

upper lobe from the chest wall. This is still improved from what it was a

couple of days ago. The costophrenic angles are sharp. The heart borders were

regular. The trachea is midline. No focal infiltrates. Otherwise, unremarkable

chest x-ray. 



ASSESSMENT:

  1.  Postoperative day #10 status post talc pleurodesis, wedge resection and

      bullectomy.

  2.  Spontaneous pneumothorax, right sided and recurrent. 

  3.  Alveolar pleural fistula, now controlled. 

  4.  Advanced intersitial lung disease clinically, idiopathic pulmonary

      fibrosis.

  5.  Underlying COPD.

  6.  Hypoxic polycythemia. 

  7.  GERD.

  8.  Diabetes. 



PLAN: We will turn the chest tube to water seal today and will likely plan to

clamp it tomorrow assuming the separation between the pleura and the chest wall

stays relatively constant, hopefully will anticipate a discharge this upcoming

Saturday. 



ATTACH E ATTESTATION

## 2021-04-01 NOTE — REP
INDICATION:

R-sided chest tube, PF.



COMPARISON:

03/31/2021, 03/30/2021



TECHNIQUE:

Two views



FINDINGS:

There is skin staples over the right chest about the level of right hilum.  There are

2 right chest tubes extending to the mid and upper chest the positions unchanged.

Small right apical pneumothorax is larger today although still small there is no

tension component or midline shift.  There is a right subclavian catheter with tip in

the right atrium inferiorly, likely near the IVC junction.  Chronic lung field changes

are stable.  The cardiomediastinal silhouette is unchanged.  Small right effusion

noted.



IMPRESSION:

1. Lines and tubes unchanged.  The small right apical pneumothorax has increased since

yesterday's study but is still small without tension component or midline shift.  At

the level where the subclavian catheter crosses the pleural margin radiographically,

the gap measures 6 mm today, 3 mm yesterday.  No other changes.





<Electronically signed by Jay Liang > 04/01/21 [id number]

## 2021-04-01 NOTE — IPNPDOC
Date Seen


The patient was seen on 4/1/21.





Progress Note


SUBJECTIVE: 


Chest tube to water seal today, decreased steroids 4/2/21.  Remains on 2 L NC. 

Denies incr chest pain, shortness of breath, n/v/d. 





OBJECTIVE: 





PHYSICAL EXAM: 


VITAL SIGNS: Please see below 


GENERAL: In NAD, resting in bed


LUNGS: Improving breath sounds b/l, crackles b/l lungs posterior 


CVS: S1 and S2, sinus rhythm. No murmurs, rubs or gallops.


GI:  Soft, nontender and nondistended. Positive bowel sounds.


CHEST: Right subclavian line 


BACK: chest tubes right side 


EXTREMITIES: No cyanosis, clubbing or pitting edema. 


: Deferred 


NEURO: CN 2-12 intact, no focal deficits. 


PSYCH: Mood and affect appropriate 





LABORATORY DATA: Please see below 





MICROBIOLOGY: Please see below 





IMAGING: 





CXR today: 


Lines and tubes unchanged.  The small right apical pneumothorax has increased 

since yesterday's study but is still small without tension component or midline 

shift.  At the level where the subclavian catheter crosses the pleural margin 

radiographically, the gap measures 6 mm today, 3 mm yesterday.  No other 

changes.





CXR 3/25/21: 


There is no significant interval change.





CXR 3/24/21: 


Findings essentially unchanged.  Two right chest tubes remain in place at the 

apex.





CXR 3/23/21: 


The subcutaneous emphysema along the right lateral chest wall has decreased. 

Question fracture tip of the right scapular versus superimposition artifact. No 

other interval change.





ASSESSMENT AND PLAN:  This is a 65-year-old male with a history of COPD, 

idiopathic pulmonary fibrosis, SLE managed by his rheumatologist, pulmonologist 

as an outpatient with Cytoxan planned for the future and chronic Plaquenil and 

prednisone, presented to the ER with shortness of breath, found to have 

spontaneous pneumothorax, status post chest tube on 03/05 to 03/08 with


recurrent pneumothorax, reinserted on 03/10, status post blood patch 03/17 and 

03/19/2021. Admitted for persistent right sided spontaneous pneumothorax , POD 

10 talc pleurodesis  03/22/21. 





PLAN: 





  1.  Spontaneous pneumothorax on the right with recurrence, status post blood 

patch on 03/17 and 03/19, POD 10 talc pleurodesis on 03/22.


  2.  COPD


  3.  Type 2 diabetes


  4.  History of idiopathic pulmonary fibrosis, SLE on chronic prednisone and 

Plaquenil. 


  5.  GERD


  6.  Alveolar pleural fistula.


  7.  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


  8.  Thrombocytopenia, improving with negative Heparin induced thrombocytopenia

panel.





DVT px: Heparin SC 





PLAN: Chest tube to water seal. Will see how he does and f/u CXR in the AM. Can 

likely decrease steroids further on 4/2/21. Thoracic surgery following closely. 

Home when medically improved.





VS, I&O, 24H, Fishbone


Vital Signs/I&O





Vital Signs








  Date Time  Temp Pulse Resp B/P (MAP) Pulse Ox O2 Delivery O2 Flow Rate FiO2


 


4/1/21 12:19 98.3 77 24 104/64 (77) 96 Nasal Cannula 2.0 














I&O- Last 24 Hours up to 6 AM 


 


 4/1/21





 06:00


 


Intake Total 505 ml


 


Output Total 75 ml


 


Balance 430 ml











Laboratory Data


24H LABS


Laboratory Tests 2


3/31/21 16:34: Bedside Glucose (Misc Panel) 257H


3/31/21 22:00: Bedside Glucose (Misc Panel) 289H


4/1/21 05:27: 


Nucleated Red Blood Cells % (auto) 0.0, Anion Gap , Glomerular Filtration Rate >

60.0, Calcium Level 7.7L, Total Bilirubin 0.3, Aspartate Amino Transf (AST/SGOT)

15, Alanine Aminotransferase (ALT/SGPT) 33, Alkaline Phosphatase 75, Total 

Protein 4.6L, Albumin 2.3L, Albumin/Globulin Ratio 1.0


4/1/21 11:54: Bedside Glucose (Misc Panel) 115


CBC/BMP


Laboratory Tests


4/1/21 05:27











Current Medications





Current Medications








 Medications


  (Trade)  Dose


 Ordered  Sig/Sadie


 Route


 PRN Reason  Start Time


 Stop Time Status Last Admin


Dose Admin


 


 Acetaminophen


  (Tylenol Tab)  650 mg  Q6HP  PRN


 PO


 T > 101.5 or HA  3/22/21 16:00


     





 


 Acetaminophen


  (Tylenol Tab)  650 mg  Q6HP  PRN


 PO


 T > 101.5 or HA  3/5/21 15:25


   Cancel  





 


 Acetaminophen/


 Hydrocodone Bitart


  (Norco, Anexsia


 5/325)  1 tab  Q3H  PRN


 PO


 MILD PAIN (PS 1-4)  3/22/21 16:00


    3/31/21 09:44





 


 Acetaminophen/


 Hydrocodone Bitart


  (Norco, Anexsia


 5/325)  1 tab  Q3H  PRN


 PO


 MILD PAIN (PS 1-4)  3/5/21 15:25


 3/22/21 15:58 DC 3/15/21 20:26





 


 Atovaquone


  (Mepron 750mg/


 5ml Suspension)  1,500 mg  DAILY


 PO


   3/6/21 09:00


    4/1/21 08:32





 


 Bisacodyl


  (Dulcolax


 Suppository)  10 mg  Q4HP  PRN


 OH


 CONSTIPATION  3/22/21 16:00


     





 


 Bisacodyl


  (Dulcolax


 Suppository)  10 mg  Q4HP  PRN


 OH


 CONSTIPATION  3/5/21 15:25


   Cancel  





 


 Cefazolin Sodium


 1 gm/Dextrose  50 ml @ 


 100 mls/hr  Q8H


 IV


   3/22/21 21:00


 3/24/21 13:29 DC 3/24/21 12:38





 


 Dextrose


  (Dextrose 50%)  25 ml  ASDIRECTED  PRN


 IV


 SEE LABEL COMMENTS  3/5/21 17:35


   Cancel  





 


 Dextrose/Sodium


 Chloride  1,000 ml @ 


 75 mls/hr  [id number]


 IV


   3/5/21 17:10


 3/5/21 20:13 DC 3/5/21 17:32





 


 Diphenhydramine


 HCl


  (Benadryl)  12.5 mg  Q4HP  PRN


 IV


 ITCHING  3/22/21 14:40


   Cancel  





 


 Docusate Sodium


  (Colace)  100 mg  BID


 PO


   3/22/21 21:00


    3/31/21 09:43





 


 Docusate Sodium


  (Colace)  100 mg  BID


 PO


   3/5/21 21:00


 3/22/21 15:58 DC 3/20/21 08:33





 


 Fentanyl Citrate


  (Sublimaze)  25 mcg  Q5MP  PRN


 IV


 PAIN LEVEL 5-10  3/22/21 17:30


 3/22/21 18:30 DC  





 


 Fentanyl/


 Bupivacaine HCl  250 ml @ 4


 mls/hr  Q24H


 EPIDURAL


   3/22/21 14:40


 3/25/21 15:56 DC 3/24/21 17:23





 


 Glucagon


  (Glucagon)  1 mg  ASDIRECTED  PRN


 SC


 SEE LABEL COMMENTS  3/5/21 17:35


   Cancel  





 


 Glucose


  (Glucose)  16 GM  ASDIRECTED  PRN


 PO


 SEE LABEL COMMENTS  3/5/21 17:35


   Cancel  





 


 Heparin Sodium


  (Heparin Lock


 Flush 10units/ml)  10 units  ASDIRECTED  PRN


 IV


 SEE LABEL COMMENTS  3/17/21 05:10


 3/22/21 15:58 DC 3/20/21 08:34





 


 Heparin Sodium


  (Heparin Lock


 Flush 10units/ml)  10 units  ASDIRECTED  PRN


 IV


 SEE LABEL COMMENTS  3/27/21 16:30


     





 


 Heparin Sodium


  (Heparin Lock


 Flush 10units/ml)  10 units  HLF


 IV


   3/27/21 22:00


    4/1/21 12:33





 


 Heparin Sodium


  (Porcine)


  (Heparin)  5,000 units  Q12H


 SC


   3/22/21 21:00


 3/23/21 07:45 DC  





 


 Heparin Sodium


  (Porcine)


  (Heparin)  5,000 units  Q12H


 SC


   3/5/21 21:00


    4/1/21 08:34





 


 Home Med


  (Med Rec


 Complete!)    ASDIRECTED


 XX


   3/5/21 16:15


 3/5/21 16:15 DC  





 


 Hydromorphone HCl


  (Dilaudid)  0.2 mg  Q5MP  PRN


 IV


 PAIN LEVEL 4-7  3/22/21 17:30


 3/22/21 18:30 DC  





 


 Hydroxychloroquine


 Sulfate


  (Plaquenil)  200 mg  BID


 PO


   3/5/21 21:00


    4/1/21 08:33





 


 Insulin Detemir


  (Levemir Insulin)  6 units  BID


 SC


   3/18/21 09:00


 3/22/21 08:46 DC 3/21/21 21:57





 


 Insulin Detemir


  (Levemir Insulin)  6 units  QHS


 SC


   3/8/21 21:00


 3/9/21 19:23 DC 3/8/21 20:57





 


 Insulin Detemir


  (Levemir Insulin)  8 units  QHS


 SC


   3/25/21 21:00


 4/1/21 08:32 DC 3/31/21 22:10





 


 Insulin Detemir


  (Levemir Insulin)  8 units  QHS


 SC


   3/9/21 21:00


 3/18/21 07:46 DC 3/17/21 21:09





 


 Insulin Detemir


  (Levemir Insulin)  12 units  BID


 SC


   3/22/21 21:00


 3/24/21 05:36 DC 3/23/21 22:02





 


 Insulin Detemir


  (Levemir Insulin)  15 units  QHS


 SC


   3/24/21 21:00


 3/25/21 10:46 DC 3/24/21 20:42





 


 Insulin Detemir


  (Levemir Insulin)  18 units  QAM


 SC


   3/24/21 09:00


 3/27/21 08:01 DC 3/24/21 09:09





 


 Insulin Detemir


  (Levemir Insulin)  25 units  QAM


 SC


   3/27/21 09:00


 3/30/21 08:03 DC  





 


 Insulin Detemir


  (Levemir Insulin)  35 units  QAM


 SC


   3/30/21 09:00


    4/1/21 08:34





 


 Insulin Human


 Lispro


  (HumaLOG INSULIN)  SEE


 PROTOCOL


 TABLE  AC


 SC


   3/6/21 07:30


    4/1/21 12:33





 


 Insulin Human


 Lispro


  (HumaLOG INSULIN)  SEE


 PROTOCOL


 TABLE  QHS


 SC


   3/5/21 21:00


    3/31/21 22:10





 


 Ketorolac


 Tromethamine


  (ToRADol)  15 mg  Q6H


 IV


   3/5/21 18:00


 3/10/21 17:59 DC 3/10/21 12:53





 


 Ketorolac


 Tromethamine


  (ToRADol)  30 mg  Q6H


 IV


   3/22/21 17:00


 3/27/21 16:59 DC 3/27/21 11:33





 


 Levalbuterol HCl


  (Xopenex Neb)  1.25 mg  Q2HP  PRN


 NEB


 WHEEZING  3/5/21 15:25


     





 


 Levalbuterol HCl


  (Xopenex Neb)  1.25 mg  RQ6H


 NEB


   3/5/21 20:00


    4/1/21 14:59





 


 Lidocaine HCl


  (LIDOCAINE 1%


 MDV 20ml)  40 ml  STAT  STAT


 SC


   3/10/21 12:39


 3/10/21 12:41 DC 3/10/21 11:54





 


 Lidocaine HCl


  (Lmx 4/Anecream)  1 dose  Q8HP  PRN


 TOP


 PAIN  3/10/21 21:50


    3/10/21 23:45





 


 Magnesium


 Hydroxide


  (Milk Of


 Magnesia)  30 ml  DAILY


 PO


   3/23/21 09:00


     





 


 Magnesium


 Hydroxide


  (Milk Of


 Magnesia)  30 ml  DAILY


 PO


   3/6/21 09:00


 3/22/21 15:58 DC 3/10/21 09:04





 


 Metoclopramide HCl


  (REGLAN


 INJection)  10 mg  Q6HP  PRN


 IV


 NAUSEA  3/22/21 14:40


   Cancel  





 


 Midazolam HCl


  (Versed)  6 mg  ASDIRECTED  STAT


 IV


   3/10/21 12:39


 3/10/21 12:41 DC 3/10/21 11:50





 


 Midodrine


  (Proamatine)  5 mg  08,12,16


 PO


   3/19/21 13:00


    4/1/21 12:32





 


 Miscellaneous


  (Unresolved


 Clarification


 Entry)  SEE LABEL


 COMMENTS  DAILY


 XX


   3/11/21 09:00


 3/11/21 14:46 DC  





 


 Naloxone HCl


  (Narcan)  0.1 mg  Q5MP  PRN


 IV


 SEE LABEL COMMENTS  3/22/21 14:40


   Cancel  





 


 Non-Formulary


 Medication


  (Epidural/PCA


 Keys)  1 each  ASDIRECTED  PRN


 XX


 SEE LABEL COMMENTS  3/22/21 14:40


   Cancel  





 


 Non-Formulary


 Medication


  (Keys)    ASDIRECTED  PRN


 XX


 SEE LABEL COMMENTS  3/22/21 14:40


   Cancel  





 


 Ondansetron HCl


  (ZOFRAN


 INJection)  4 mg  Q4HP  PRN


 IV


 NAUSEA  3/22/21 16:00


     





 


 Ondansetron HCl


  (ZOFRAN


 INJection)  4 mg  Q4HP  PRN


 IV


 NAUSEA OR VOMITING  3/22/21 17:30


 3/22/21 18:30 DC  





 


 Ondansetron HCl


  (ZOFRAN


 INJection)  4 mg  Q4HP  PRN


 IV


 NAUSEA  3/5/21 15:25


   Cancel  





 


 Ondansetron HCl


  (ZOFRAN


 INJection)  4 mg  Q6HP  PRN


 IV


 REFRACTORY NAUSEA  3/22/21 14:40


   Cancel  





 


 Oxycodone HCl


  (Roxicodone,


 Oxyir)  5 mg  ASDIRECTED  PRN


 PO


 PAIN LEVEL 1-4  3/22/21 17:30


 3/22/21 18:30 DC  





 


 Oxycodone/


 Acetaminophen


  (Percocet 5mg/


 325mg Tablet)  1 tab  Q4H  PRN


 PO


 MODERATE PAIN (PS 5-7)  3/22/21 16:00


    3/31/21 22:11





 


 Oxycodone/


 Acetaminophen


  (Percocet 5mg/


 325mg Tablet)  1 tab  Q4H  PRN


 PO


 MODERATE PAIN (PS 5-7)  3/5/21 15:25


 3/22/21 15:58 DC 3/19/21 21:59





 


 Oxycodone/


 Acetaminophen


  (Percocet 5mg/


 325mg Tablet)  2 tab  Q4H  PRN


 PO


 SEVERE PAIN (PS 8-10)  3/22/21 16:00


     





 


 Oxycodone/


 Acetaminophen


  (Percocet 5mg/


 325mg Tablet)  2 tab  Q4H  PRN


 PO


 SEVERE PAIN (PS 8-10)  3/5/21 15:25


 3/22/21 15:58 DC 3/12/21 22:59





 


 Pantoprazole


 Sodium


  (Protonix)  40 mg  DAILY


 IV


   3/23/21 09:00


    4/1/21 08:34





 


 Pantoprazole


 Sodium


  (Protonix)  40 mg  DAILY


 PO


   3/23/21 09:00


 3/23/21 07:46 DC  





 


 Pantoprazole


 Sodium


  (Protonix)  40 mg  DAILY


 PO


   3/6/21 09:00


 3/22/21 15:58 DC 3/22/21 09:18





 


 Phenylephrine HCl


 50 mg/Dextrose  500 ml @ 


 7.2 mls/hr  Q24H


 IV


   3/22/21 21:05


 3/24/21 17:39 DC 3/22/21 23:24





 


 Phenylephrine HCl


 50 mg/Dextrose  500 ml @ 


 15 mls/hr  Q24H


 IV


   3/23/21 18:21


   UNV  





 


 Potassium


 Chloride/Dextrose/


 Sod Cl  1,000 ml @ 


 75 mls/hr  [id number]


 IV


   3/22/21 15:58


 3/24/21 09:26 DC 3/23/21 17:59





 


 Potassium


 Chloride/Dextrose/


 Sod Cl  1,000 ml @ 


 75 mls/hr  [id number]


 IV


   3/5/21 15:25


 3/5/21 17:11 DC  





 


 Prednisone


  (Deltasone)  40 mg  DAILY


 PO


   4/1/21 09:00


    4/1/21 08:32





 


 Prednisone


  (Deltasone)  60 mg  DAILY


 PO


   3/6/21 09:00


 3/31/21 09:57 DC 3/31/21 09:43





 


 Sodium Chloride  1,000 ml @ 


 100 mls/hr  Q10H


 IV


   3/24/21 12:25


 3/24/21 17:37 DC 3/24/21 12:35





 


 Sodium Chloride


  (Saline Lock


 Flush)  2 ml  ASDIRECTED  PRN


 IV


 SEE LABEL COMMENTS  3/15/21 08:00


     





 


 Sodium Chloride


  (Saline Lock


 Flush)  2 ml  SLF


 IV


   3/15/21 14:00


    4/1/21 12:33





 


 Sodium Chloride


  (Saline Lock


 Flush)  10 ml  ASDIRECTED  PRN


 IV


 SEE LABEL COMMENTS  3/17/21 05:10


 3/22/21 15:58 DC 3/20/21 08:35





 


 Sodium Chloride


  (Saline Lock


 Flush)  10 ml  ASDIRECTED  PRN


 IV


 SEE LABEL COMMENTS  3/27/21 16:30


     





 


 Sodium Chloride


  (Saline Lock


 Flush)  10 ml  SLF


 IV


   3/27/21 22:00


    4/1/21 12:33














Allergies


Coded Allergies:  


     No Known Drug Allergies (Verified  Allergy, Unknown, 3/5/21)











Jolanta Arias MD             Apr 1, 2021 15:56

## 2021-04-02 VITALS — SYSTOLIC BLOOD PRESSURE: 103 MMHG | DIASTOLIC BLOOD PRESSURE: 59 MMHG

## 2021-04-02 VITALS — DIASTOLIC BLOOD PRESSURE: 68 MMHG | SYSTOLIC BLOOD PRESSURE: 114 MMHG

## 2021-04-02 VITALS — SYSTOLIC BLOOD PRESSURE: 105 MMHG | DIASTOLIC BLOOD PRESSURE: 63 MMHG

## 2021-04-02 VITALS — OXYGEN SATURATION: 95 %

## 2021-04-02 VITALS — SYSTOLIC BLOOD PRESSURE: 108 MMHG | DIASTOLIC BLOOD PRESSURE: 66 MMHG

## 2021-04-02 VITALS — DIASTOLIC BLOOD PRESSURE: 56 MMHG | SYSTOLIC BLOOD PRESSURE: 107 MMHG

## 2021-04-02 VITALS — SYSTOLIC BLOOD PRESSURE: 104 MMHG | DIASTOLIC BLOOD PRESSURE: 63 MMHG

## 2021-04-02 LAB
BUN SERPL-MCNC: 21 MG/DL (ref 7–18)
CALCIUM SERPL-MCNC: 7.7 MG/DL (ref 8.8–10.2)
CHLORIDE SERPL-SCNC: 100 MEQ/L (ref 98–107)
CO2 SERPL-SCNC: 41 MEQ/L (ref 21–32)
CREAT SERPL-MCNC: 0.57 MG/DL (ref 0.7–1.3)
GFR SERPL CREATININE-BSD FRML MDRD: > 60 ML/MIN/{1.73_M2} (ref 49–?)
GLUCOSE SERPL-MCNC: 138 MG/DL (ref 70–100)
HCT VFR BLD AUTO: 44.4 % (ref 42–52)
HGB BLD-MCNC: 13.7 G/DL (ref 13.5–17.5)
MCH RBC QN AUTO: 29.5 PG (ref 27–33)
MCHC RBC AUTO-ENTMCNC: 30.9 G/DL (ref 32–36.5)
MCV RBC AUTO: 95.5 FL (ref 80–96)
PLATELET # BLD AUTO: 169 10^3/UL (ref 150–450)
POTASSIUM SERPL-SCNC: 4.1 MEQ/L (ref 3.5–5.1)
RBC # BLD AUTO: 4.65 10^6/UL (ref 4.3–6.1)
SODIUM SERPL-SCNC: 141 MEQ/L (ref 136–145)
WBC # BLD AUTO: 10.9 10^3/UL (ref 4–10)

## 2021-04-02 RX ADMIN — INSULIN LISPRO SCH UNITS: 100 INJECTION, SOLUTION INTRAVENOUS; SUBCUTANEOUS at 17:30

## 2021-04-02 RX ADMIN — MIDODRINE HYDROCHLORIDE SCH MG: 5 TABLET ORAL at 12:31

## 2021-04-02 RX ADMIN — Medication SCH UNITS: at 12:32

## 2021-04-02 RX ADMIN — ATOVAQUONE SCH MG: 750 SUSPENSION ORAL at 09:36

## 2021-04-02 RX ADMIN — SODIUM CHLORIDE SCH UNITS: 4.5 INJECTION, SOLUTION INTRAVENOUS at 09:37

## 2021-04-02 RX ADMIN — SODIUM CHLORIDE SCH ML: 9 INJECTION, SOLUTION INTRAMUSCULAR; INTRAVENOUS; SUBCUTANEOUS at 22:59

## 2021-04-02 RX ADMIN — SODIUM CHLORIDE, PRESERVATIVE FREE SCH ML: 5 INJECTION INTRAVENOUS at 22:00

## 2021-04-02 RX ADMIN — LEVALBUTEROL HYDROCHLORIDE SCH MG: 1.25 SOLUTION, CONCENTRATE RESPIRATORY (INHALATION) at 20:48

## 2021-04-02 RX ADMIN — DEXTROSE MONOHYDRATE SCH MG: 50 INJECTION, SOLUTION INTRAVENOUS at 09:37

## 2021-04-02 RX ADMIN — INSULIN LISPRO SCH UNITS: 100 INJECTION, SOLUTION INTRAVENOUS; SUBCUTANEOUS at 12:32

## 2021-04-02 RX ADMIN — INSULIN LISPRO SCH UNITS: 100 INJECTION, SOLUTION INTRAVENOUS; SUBCUTANEOUS at 20:28

## 2021-04-02 RX ADMIN — LEVALBUTEROL HYDROCHLORIDE SCH MG: 1.25 SOLUTION, CONCENTRATE RESPIRATORY (INHALATION) at 01:48

## 2021-04-02 RX ADMIN — SODIUM CHLORIDE, PRESERVATIVE FREE SCH ML: 5 INJECTION INTRAVENOUS at 12:32

## 2021-04-02 RX ADMIN — DOCUSATE SODIUM SCH MG: 100 CAPSULE, LIQUID FILLED ORAL at 20:28

## 2021-04-02 RX ADMIN — MIDODRINE HYDROCHLORIDE SCH MG: 5 TABLET ORAL at 17:29

## 2021-04-02 RX ADMIN — MAGNESIUM HYDROXIDE SCH ML: 400 SUSPENSION ORAL at 09:00

## 2021-04-02 RX ADMIN — SODIUM CHLORIDE SCH UNITS: 4.5 INJECTION, SOLUTION INTRAVENOUS at 20:30

## 2021-04-02 RX ADMIN — MIDODRINE HYDROCHLORIDE SCH MG: 5 TABLET ORAL at 09:37

## 2021-04-02 RX ADMIN — LEVALBUTEROL HYDROCHLORIDE SCH MG: 1.25 SOLUTION, CONCENTRATE RESPIRATORY (INHALATION) at 12:19

## 2021-04-02 RX ADMIN — LEVALBUTEROL HYDROCHLORIDE SCH MG: 1.25 SOLUTION, CONCENTRATE RESPIRATORY (INHALATION) at 07:27

## 2021-04-02 RX ADMIN — DOCUSATE SODIUM SCH MG: 100 CAPSULE, LIQUID FILLED ORAL at 09:00

## 2021-04-02 RX ADMIN — SODIUM CHLORIDE, PRESERVATIVE FREE SCH ML: 5 INJECTION INTRAVENOUS at 06:19

## 2021-04-02 RX ADMIN — SODIUM CHLORIDE SCH ML: 9 INJECTION, SOLUTION INTRAMUSCULAR; INTRAVENOUS; SUBCUTANEOUS at 12:32

## 2021-04-02 RX ADMIN — INSULIN DETEMIR SCH UNITS: 100 INJECTION, SOLUTION SUBCUTANEOUS at 09:00

## 2021-04-02 RX ADMIN — HYDROXYCHLOROQUINE SULFATE SCH MG: 200 TABLET, FILM COATED ENTERAL at 20:27

## 2021-04-02 RX ADMIN — Medication SCH UNITS: at 22:59

## 2021-04-02 RX ADMIN — HYDROXYCHLOROQUINE SULFATE SCH MG: 200 TABLET, FILM COATED ENTERAL at 09:37

## 2021-04-02 RX ADMIN — HYDROCODONE BITARTRATE AND ACETAMINOPHEN PRN TAB: 5; 325 TABLET ORAL at 23:00

## 2021-04-02 RX ADMIN — Medication SCH UNITS: at 06:20

## 2021-04-02 RX ADMIN — SODIUM CHLORIDE SCH ML: 9 INJECTION, SOLUTION INTRAMUSCULAR; INTRAVENOUS; SUBCUTANEOUS at 06:20

## 2021-04-02 RX ADMIN — INSULIN LISPRO SCH UNITS: 100 INJECTION, SOLUTION INTRAVENOUS; SUBCUTANEOUS at 09:36

## 2021-04-02 NOTE — REP
INDICATION:

R-sided chest tube, PF



COMPARISON:

04/01/2021



TECHNIQUE:

PA and lateral.



FINDINGS:

Two right-sided chest tubes are identified and the right pneumothorax has increased

from prior examination.  Left hemithorax demonstrates chronic stable increased

interstitial markings.  Subtle superimposed acute process cannot be excluded.  No

obvious effusion.  Visualized mediastinum and cardiac silhouette stable.  Skeletal

structures intact.



Right subclavian catheter with tip in the right atrium.



IMPRESSION:

Increased right pneumothorax.





<Electronically signed by Nixon Rojas > 04/02/21 [id number]

## 2021-04-02 NOTE — REP
INDICATION:

Chest pain new.



COMPARISON:

Portable chest dated 6:14 a.m., 04/02/2021, earlier this same date.



TECHNIQUE:

PA and lateral chest, 7:31 a.m..



FINDINGS:

The 2 right thoracotomy tubes are unchanged.

The right pneumothorax on the comparison study is no longer present.

There is diffuse bilateral interstitial coarsening, unchanged.  No focal opacities are

identified.  This is unchanged.

Cardiac size is upper normal.

There is a right subclavian central venous catheter with the tip in the right atrium,

unchanged.

There are 3 surgical staple superimposed over the right hemithorax, unchanged.

The patient is rotated.





IMPRESSION:

The previous right pneumothorax is no longer present.

There is no other significant interval change.





<Electronically signed by Richard Junior > 04/02/21 [id number]

## 2021-04-02 NOTE — IPNPDOC
Text Note


Date of Service


The patient was seen on 4/2/21.





NOTE


SUBJECTIVE: 


Apparently the patient had a leak around the chest tube. This morning, 

cardiothoracic surgery was called and the tape around the chest tube site was 

readjusted and the leak was sealed. The patient does not have any complaints at 

this time, he just continues to feel tired and fatigued, otherwise the remainder

of his review systems is negative.





OBJECTIVE: 





PHYSICAL EXAM: 


VITAL SIGNS: Please see below 


GENERAL: In NAD, sitting upright in chair


LUNGS: Mild crackles and gurgling noted on the right side near the chest tube, 

but otherwise clear lung sounds at the apex of the right lung, and throughout 

the left side.  Chest tube is set to water seal at this time


CVS: S1 and S2, sinus rhythm. No murmurs, rubs or gallops.


GI:  Soft, nontender and nondistended. Positive bowel sounds.


CHEST: Right subclavian line 


BACK: chest tubes right side 


EXTREMITIES: No cyanosis, clubbing or pitting edema. 


: Deferred 


NEURO: CN 2-12 intact, no focal deficits. 


PSYCH: Mood and affect appropriate 





ASSESSMENT AND PLAN:  This is a 65-year-old male with a history of COPD, 

idiopathic pulmonary fibrosis, SLE managed by his rheumatologist, pulmonologist 

as an outpatient with Cytoxan planned for the future and chronic Plaquenil and 

prednisone, presented to the ER with shortness of breath, found to have 

spontaneous pneumothorax, status post chest tube on 03/05 to 03/08 with


recurrent pneumothorax, reinserted on 03/10, status post blood patch 03/17 and 

03/19/2021. Admitted for persistent right sided spontaneous pneumothorax , POD 

10 talc pleurodesis  03/22/21. 





PLAN: 





  1.  Spontaneous pneumothorax on the right with recurrence, status post blood 

patch on 03/17 and 03/19, POD 10 talc pleurodesis on 03/22.


  2.  COPD


  3.  Type 2 diabetes


  4.  History of idiopathic pulmonary fibrosis, SLE on chronic prednisone and 

Plaquenil. 


  5.  GERD


  6.  Alveolar pleural fistula.


  7.  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


  8.  Thrombocytopenia, improving with negative Heparin induced thrombocytopenia

panel.





DVT px: Heparin SC 





PLAN: Prednisone decreased to 20 mg daily. Otherwise will defer to 

cardiothoracic surgery regarding the appropriate timing of removing the chest 

tube. The patient will also need to continue working with PT/OT such that he may

be safe for going home





VS,Fishbone, I+O


VS, Fishbone, I+O


Laboratory Tests


4/2/21 04:50











Vital Signs








  Date Time  Temp Pulse Resp B/P (MAP) Pulse Ox O2 Delivery O2 Flow Rate FiO2


 


4/2/21 16:00       2.0 


 


4/2/21 16:00 96.9 96 22 104/63 (77 94 Nasal Cannula  














I&O- Last 24 Hours up to 6 AM 


 


 4/2/21





 06:00


 


Intake Total 1620 ml


 


Output Total 1095 ml


 


Balance 525 ml

















OSIEL MCALLISTER DO                Apr 2, 2021 16:47

## 2021-04-02 NOTE — RO
OPERATIVE NOTE



DATE OF OPERATION: 03/22/2021



PREPROCEDURE DIAGNOSIS: Bronchoalveolar pleural fistula. 



POSTPROCEDURE DIAGNOSIS: Bronchoalveolar pleural fistula. 



PROCEDURES: Multiple wedge resections x3 of bullae and blebs and talc

pleurodesis, bronchoscopy, and five level rib block all with VATS techniques. 



SURGEON: Cruz Hay M.D. 



ASSISTANT: None. 



ANESTHESIA: General.



FINDINGS: The bronchoscopy revealed a normal branching tracheobronchial tree.

It was relatively dry. There were no endobronchial lesions seen. 



The thoracoscopy revealed a cobblestoned lung. Pictures were taken. There are

two large bullae at the bottom of the right lower lobe. I spent a lot of time

trying to find a culprit air leak by placing water in the pleural cavity and

gently manipulating the lung to look for leaking bubbles; I could see none.

There was a small defect in one of the bulla. I therefore, decided to resect

both bullae. There was also a small bleb at the edge of the lower lobe and I

also resected that.  



DESCRIPTION OF PROCEDURE: Under satisfactory general anesthesia and

single-lumen tube endotracheal intubation, bronchoscope was placed into the

tracheobronchial tree. Each segment and subsegment were thoroughly inspected

and the above findings were noted without any endobronchial lesions. The

patient was then turned to the left lateral decubitus position, sterilely

prepped and draped in the usual manner. A thoracostomy incision was made in the

approximate sixth intercostal space. The scope was placed and the chest was

inspected. Two additional ports were then placed; one 5 mm and one 12 mm. The

lung was gently inflated all the while insufflating water over the lung surface

in order to look for bubbles. I could find none. Two large bullae were found,

one with a defect although it was not bubbling. I was not sure if that might

have been the culprit. I therefore, decided to undertake bulla resections. The

lateral bulla was ceased with a grasper and through a 12 mm port. A staple was

placed. After some manipulation, the staple could surround the bulla and it was

then resected cleanly. The next bulla inferiorly on the diaphragmatic surface

was then again ceased and manipulated so that the bulla could be resected and

wedged with a 4.8 mm stapler. There was an additional small bleb, which was

found at the bottom of the right lower lobe, and this was also resected with a

small amount of parenchyma. 



TISSEEL glue was then placed on the staple lines and then the pleural cavity

was thoroughly insufflated with talc uniformly. Pictures were taken. Two chest

tubes were placed both #24 a curved and a straight posteriorly and anteriorly

respectively. These were secured to the chest wall with 0-silk suture. The

tubes were placed through two thoracostomy port incisions. The third and fourth

incisions were closed with running 0-Vicryl suture for the extrathoracic

muscles, running 3-0 Vicryl suture for the subcutaneous tissue, and running 4-0

Monocryl subcuticular suture for the skin. The patient tolerated the procedure

well. The patient was turned supine and the anterior chest tube, which was left

in placed, was then removed and the wound was stapled so that he would not suck

air. This was covered with Vaseline gauze, tape, and was also covered with a

Xeroform dressing. The patient tolerated the procedure well and left the

operating room in satisfactory condition to the recovery room.

## 2021-04-02 NOTE — IPN
THORACIC SURGERY PROGRESS NOTE



DATE:  04/02/2021



SUBJECTIVE: At about 0500 this morning the patient was walking back from the

bathroom when he developed sudden onset sharp right sided chest pain near the

chest tube insertion site. He called the nursing staff who contacted Dr. Hay. The nursing staff noticed some mild crepitus around his chest tube

area. A portable chest x-ray was ordered as the x-ray downstairs was

nonfunctional that showed an expanded pneumothorax. They replaced the tape

around the tubing and the air leak went away. A repeat chest x-ray was done

that showed reexpansion of the lung to the chest wall. Dr. Hay advised

setting the chest tube to -40 suction and getting the portable chest x-ray. 

Once the air leak had been resolved, his pain dissipated almost immediately.

Presently, the patient states he is in no pain. He is breathing well and he is

perfectly fine. The chest tube is exhibiting no air leak as well. 



OBJECTIVE: 

VITAL SIGNS: Temperature is 97.0, pulse is 68 and regular, respiratory rate is

16, blood pressure is 114/68, satting 98% on three liters nasal cannula. 

INPUT AND OUTPUT:  1260 ml of intake, 285 of output with 35 ccs of chest tube

drainage and it looks like seven voids that were likely not counted towards the

total output. 

GENERAL: Patient is a thin, well-appearing male sitting upright in bed in no

acute distress speaking in complete sentences. 

HEENT: Normocephalic, atraumatic. EOMI. No scleral icterus. Mucous membranes

are moist. 

NECK: No JVD or lymphadenopathy. Trachea is midline. 

CARDIOVASCULAR: Regular rate and rhythm. Normal S1 and S2. No murmurs, gallops,

rubs. 

RESPIRATORY: Bilateral Velcro-like crackles appreciated. Bronchophony

appreciated in the right lower lobe. No other rhonchi or wheezing appreciated,

or additional adventitious breath sounds. 

ABDOMEN: Soft, nontender and nondistended. Bowel sounds are present. No

hepatosplenomegaly. 

EXTREMITIES: No edema in bilateral upper or lower extremities.

NEUROLOGIC: Cranial nerves I-XII grossly intact. Gait is stable. Strength

testing is not performed. Sensation is intact. 

PSYCH: Awake, alert and oriented x3 with appropriate mood and affect. 



LABORATORY DATA:  White blood cell count of 10.9, hemoglobin 13.7, hematocrit

44.4, platelet count of 169,000. Sodium 141, potassium 4.1, chloride 100,

bicarbonate 41, BUN 21, creatinine 0.57. Glucose of 138, calcium 7.7. 



IMAGING:  Portable chest x-ray from this morning shows an expanded pneumothorax

of the right lower lobe away from the chest wall with no tracheal deviation. A

repeat 2-view chest x-ray done at 7:57 demonstrates reexpansion of the pleura

to the chest wall with no signs of subcutaneous emphysema and a very minimal

airspace in the right upper lobe, otherwise unremarkable chest x-ray with

normal heart borders, sharp costophrenic angles and no sign of acute

infiltrate. 



ASSESSMENT:

  1.  Postop day #11 status post talc pleurodesis, wedge resection and

      bullectomy.

  2.  Spontaneous pneumothorax right sided and recurrent. 

  3.  Alveolar pleural fistula now controlled. 

  4.  Advanced interstitial lung disease, clinically idiopathic pulmonary

      fibrosis.

  5.  Underlying COPD.

  6.  Hypoxic polycythemia.

  7.  GERD.

  8.  Diabetes. 



This seems to have been a mechanical failure where the tape had just become

dislodged allowing the air leak to occur. The nursing staff replaced the tape,

however we also replaced the nursing staff's tape, there are no signs of an air

leak. We will continue a water seal for the next 24 hours and provided he does

well will other clamp it or discontinue the chest tube entirely tomorrow

morning with hopeful anticipated discharge tomorrow as well. Patient is in

agreement with the plan. 



E ATTESTATION 





Attending note:  According to nursing reports, his pain and dyspnea acutely 
developed about 5AM.  Upon receiving a call I asked for a chest x-ray which 
showed separation from the inferior chest wall.  Nursing noted an air leak and 
maybe loose connections.  These were tightened.  I have taken down the tape and 
retightened the chest tube connections.  There is no air leak and I will 
continue him on underwater seal after having placed him on -40 cm H2O earlier 
this morning.  We will follow the chest x-rays.  It notable that the cupula of 
the lung looked to be adherent to the chest wall on the earlier x-ray.  -- ROSA aHy MD

[facility]

## 2021-04-03 VITALS — DIASTOLIC BLOOD PRESSURE: 61 MMHG | SYSTOLIC BLOOD PRESSURE: 103 MMHG

## 2021-04-03 VITALS — DIASTOLIC BLOOD PRESSURE: 78 MMHG | SYSTOLIC BLOOD PRESSURE: 128 MMHG

## 2021-04-03 VITALS — OXYGEN SATURATION: 95 %

## 2021-04-03 VITALS — DIASTOLIC BLOOD PRESSURE: 71 MMHG | SYSTOLIC BLOOD PRESSURE: 113 MMHG

## 2021-04-03 VITALS — DIASTOLIC BLOOD PRESSURE: 72 MMHG | SYSTOLIC BLOOD PRESSURE: 111 MMHG

## 2021-04-03 VITALS — DIASTOLIC BLOOD PRESSURE: 58 MMHG | SYSTOLIC BLOOD PRESSURE: 99 MMHG

## 2021-04-03 VITALS — SYSTOLIC BLOOD PRESSURE: 114 MMHG | DIASTOLIC BLOOD PRESSURE: 61 MMHG

## 2021-04-03 LAB
BUN SERPL-MCNC: 19 MG/DL (ref 7–18)
CALCIUM SERPL-MCNC: 8.4 MG/DL (ref 8.8–10.2)
CHLORIDE SERPL-SCNC: 98 MEQ/L (ref 98–107)
CO2 SERPL-SCNC: 39 MEQ/L (ref 21–32)
CREAT SERPL-MCNC: 0.63 MG/DL (ref 0.7–1.3)
GFR SERPL CREATININE-BSD FRML MDRD: > 60 ML/MIN/{1.73_M2} (ref 49–?)
GLUCOSE SERPL-MCNC: 229 MG/DL (ref 70–100)
HCT VFR BLD AUTO: 47.8 % (ref 42–52)
HGB BLD-MCNC: 14.7 G/DL (ref 13.5–17.5)
MCH RBC QN AUTO: 29.5 PG (ref 27–33)
MCHC RBC AUTO-ENTMCNC: 30.8 G/DL (ref 32–36.5)
MCV RBC AUTO: 96 FL (ref 80–96)
PLATELET # BLD AUTO: 182 10^3/UL (ref 150–450)
POTASSIUM SERPL-SCNC: 4.1 MEQ/L (ref 3.5–5.1)
RBC # BLD AUTO: 4.98 10^6/UL (ref 4.3–6.1)
SODIUM SERPL-SCNC: 139 MEQ/L (ref 136–145)
WBC # BLD AUTO: 14.1 10^3/UL (ref 4–10)

## 2021-04-03 RX ADMIN — SODIUM CHLORIDE SCH ML: 9 INJECTION, SOLUTION INTRAMUSCULAR; INTRAVENOUS; SUBCUTANEOUS at 05:48

## 2021-04-03 RX ADMIN — Medication SCH UNITS: at 22:19

## 2021-04-03 RX ADMIN — LEVALBUTEROL HYDROCHLORIDE SCH MG: 1.25 SOLUTION, CONCENTRATE RESPIRATORY (INHALATION) at 12:45

## 2021-04-03 RX ADMIN — SODIUM CHLORIDE SCH ML: 9 INJECTION, SOLUTION INTRAMUSCULAR; INTRAVENOUS; SUBCUTANEOUS at 22:19

## 2021-04-03 RX ADMIN — SODIUM CHLORIDE SCH UNITS: 4.5 INJECTION, SOLUTION INTRAVENOUS at 09:09

## 2021-04-03 RX ADMIN — ATOVAQUONE SCH MG: 750 SUSPENSION ORAL at 09:08

## 2021-04-03 RX ADMIN — LEVALBUTEROL HYDROCHLORIDE SCH MG: 1.25 SOLUTION, CONCENTRATE RESPIRATORY (INHALATION) at 20:49

## 2021-04-03 RX ADMIN — DOCUSATE SODIUM SCH MG: 100 CAPSULE, LIQUID FILLED ORAL at 21:00

## 2021-04-03 RX ADMIN — SODIUM CHLORIDE, PRESERVATIVE FREE SCH ML: 5 INJECTION INTRAVENOUS at 13:55

## 2021-04-03 RX ADMIN — INSULIN LISPRO SCH UNITS: 100 INJECTION, SOLUTION INTRAVENOUS; SUBCUTANEOUS at 12:00

## 2021-04-03 RX ADMIN — INSULIN DETEMIR SCH UNITS: 100 INJECTION, SOLUTION SUBCUTANEOUS at 09:00

## 2021-04-03 RX ADMIN — MIDODRINE HYDROCHLORIDE SCH MG: 5 TABLET ORAL at 08:00

## 2021-04-03 RX ADMIN — SODIUM CHLORIDE SCH UNITS: 4.5 INJECTION, SOLUTION INTRAVENOUS at 22:19

## 2021-04-03 RX ADMIN — HYDROXYCHLOROQUINE SULFATE SCH MG: 200 TABLET, FILM COATED ENTERAL at 22:17

## 2021-04-03 RX ADMIN — DEXTROSE MONOHYDRATE SCH MG: 50 INJECTION, SOLUTION INTRAVENOUS at 09:08

## 2021-04-03 RX ADMIN — MIDODRINE HYDROCHLORIDE SCH MG: 5 TABLET ORAL at 12:00

## 2021-04-03 RX ADMIN — HYDROXYCHLOROQUINE SULFATE SCH MG: 200 TABLET, FILM COATED ENTERAL at 09:09

## 2021-04-03 RX ADMIN — LEVALBUTEROL HYDROCHLORIDE SCH MG: 1.25 SOLUTION, CONCENTRATE RESPIRATORY (INHALATION) at 07:17

## 2021-04-03 RX ADMIN — LEVALBUTEROL HYDROCHLORIDE SCH MG: 1.25 SOLUTION, CONCENTRATE RESPIRATORY (INHALATION) at 02:21

## 2021-04-03 RX ADMIN — INSULIN LISPRO SCH UNITS: 100 INJECTION, SOLUTION INTRAVENOUS; SUBCUTANEOUS at 17:37

## 2021-04-03 RX ADMIN — INSULIN LISPRO SCH UNITS: 100 INJECTION, SOLUTION INTRAVENOUS; SUBCUTANEOUS at 22:19

## 2021-04-03 RX ADMIN — SODIUM CHLORIDE SCH ML: 9 INJECTION, SOLUTION INTRAMUSCULAR; INTRAVENOUS; SUBCUTANEOUS at 13:55

## 2021-04-03 RX ADMIN — INSULIN LISPRO SCH UNITS: 100 INJECTION, SOLUTION INTRAVENOUS; SUBCUTANEOUS at 07:30

## 2021-04-03 RX ADMIN — MAGNESIUM HYDROXIDE SCH ML: 400 SUSPENSION ORAL at 09:00

## 2021-04-03 RX ADMIN — DOCUSATE SODIUM SCH MG: 100 CAPSULE, LIQUID FILLED ORAL at 09:00

## 2021-04-03 RX ADMIN — SODIUM CHLORIDE, PRESERVATIVE FREE SCH ML: 5 INJECTION INTRAVENOUS at 04:35

## 2021-04-03 RX ADMIN — Medication SCH UNITS: at 13:55

## 2021-04-03 RX ADMIN — Medication SCH UNITS: at 05:47

## 2021-04-03 RX ADMIN — SODIUM CHLORIDE, PRESERVATIVE FREE SCH ML: 5 INJECTION INTRAVENOUS at 22:00

## 2021-04-03 RX ADMIN — MIDODRINE HYDROCHLORIDE SCH MG: 5 TABLET ORAL at 16:13

## 2021-04-03 NOTE — REP
INDICATION:

R-sided chest tube, PF.



COMPARISON:

A PA and lateral chest dated 04/02/2021.



TECHNIQUE:

Upright PA and lateral chest.



FINDINGS:

The 2 right thoracotomy tubes are unchanged.

There is a small right apical pneumothorax as an interval change.

Diffuse bilateral interstitial coarsening is unchanged.

Cardiac size is upper normal, unchanged.

The right subclavian central venous catheter is unchanged with tip in the right atrium.

The 3 surgical staples are again noted superimposed over the right hemithorax,

unchanged.





IMPRESSION:

New small right apical pneumothorax as an interval change.

No other interval change.





<Electronically signed by Richard Junior > 04/03/21 [id number]

## 2021-04-03 NOTE — IPNPDOC
Text Note


Date of Service


The patient was seen on 4/3/21.





NOTE


SUBJECTIVE: 


Patient is sitting upright in the chair again today. He is in no acute distress 

at this time. He does not have any significant complaints at this time, he does 

feel short of breath with exertion which is likely secondary to deconditioning. 

no pain at this time. Chest tube still in place to water seal.    





OBJECTIVE: 





PHYSICAL EXAM: 


VITAL SIGNS: Please see below 


GENERAL: In NAD, sitting upright in chair


LUNGS: Mild crackles and gurgling noted on the right side near the chest tube, 

but otherwise clear lung sounds at the apex of the right lung, and throughout 

the left side.  Chest tube is set to water seal at this time


CVS: S1 and S2, sinus rhythm. No murmurs, rubs or gallops.


GI:  Soft, nontender and nondistended. Positive bowel sounds.


CHEST: Right subclavian line 


BACK: chest tubes right side 


EXTREMITIES: No cyanosis, clubbing or pitting edema. 


: Deferred 


NEURO: CN 2-12 intact, no focal deficits. 


PSYCH: Mood and affect appropriate 





ASSESSMENT AND PLAN:  This is a 65-year-old male with a history of COPD, 

idiopathic pulmonary fibrosis, SLE managed by his rheumatologist, pulmonologist 

as an outpatient with Cytoxan planned for the future and chronic Plaquenil and 

prednisone, presented to the ER with shortness of breath, found to have spo

ntaneous pneumothorax, status post chest tube on 03/05 to 03/08 with


recurrent pneumothorax, reinserted on 03/10, status post blood patch 03/17 and 

03/19/2021. Admitted for persistent right sided spontaneous pneumothorax , POD 

10 talc pleurodesis  03/22/21. 





PLAN: 





  1.  Spontaneous pneumothorax on the right with recurrence, status post blood 

patch on 03/17 and 03/19, POD 10 talc pleurodesis on 03/22.


  2.  COPD


  3.  Type 2 diabetes


  4.  History of idiopathic pulmonary fibrosis, 


  5.  SLE on chronic prednisone and Plaquenil. 


  6.  GERD


  7.  Alveolar pleural fistula.


  8.  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


  9.  Thrombocytopenia, improving with negative Heparin induced thrombocytopenia

panel.





DVT px: Heparin SC 





PLAN: No changes in medical regimen at this time. We will defer to 

cardiothoracic surgery regarding the management of this chest tubes. He will 

need to continue to work with physical therapy prior to discharge, he does have 

quite a few steps into and within the home and is not feeling he will be able to

navigate those in his current state.





VSClarke, I+O


VSClarke, I+O


Laboratory Tests


4/3/21 09:27











Vital Signs








  Date Time  Temp Pulse Resp B/P (MAP) Pulse Ox O2 Delivery O2 Flow Rate FiO2


 


4/3/21 12:00       2.0 


 


4/3/21 11:55 98.2 67 20 114/61 (78) 98 Nasal Cannula  














I&O- Last 24 Hours up to 6 AM 


 


 4/3/21





 06:00


 


Intake Total 840 ml


 


Output Total 38 ml


 


Balance 802 ml

















OSIEL MCALLISTER DO                Apr 3, 2021 16:10

## 2021-04-03 NOTE — IPN
PROGRESS NOTE



DATE:  04/03/2021



This is now the 12t postoperative day for Mr. Christine. Yesterday he developed

acute pain early in the morning with shortness of breath, and a chest x-ray at

that time shows that the lower portion of the lung had  from the chest

wall on the right side. It was also noted that the connections were loose, and

the nurse staff redid the connections, and after placing him on 40 cm of

suction, a repeat chest x-ray showed the lung fully expanded to the chest wall.

I therefore elected to keep him off suction yesterday, and today the chest

x-ray just shows a little separation from the chest wall superiorly but with

the cupula intact against the chest wall. He is not complaining of any unusual

or undue shortness of breath, although he is chronically very short of breath

from his pulmonary fibrosis. 



His vital signs show a maximum temperature of 98.5 with a heart rate that

ranges between 70-73 in a sinus rhythm, respiratory rate that is constant at 18

without the use of accessory muscles, who is 98%-100% saturated on 2 liters

nasal cannula and whose blood pressure is ranging between 128/78 to 111/72. 



His intake and output for the past 24 hours has been recorded as 1080 in and

820 out for a positivity of 260 mL. He has put out 20 mL from the chest tube,

and there is no air leak. His weight today is 69.3 kg compared to 71.1 kg

yesterday.



PHYSICAL EXAMINATION: He has equal breath sounds on either side, both sides

showing Velcro crackles. I do hear a friction rub on the right side. Percussion

notes are full to the diaphragm. Cardiac exam is without murmurs, clicks,

gallops, or rubs.  I cannot feel his point of maximal impulse (PMI). S1 and S2

are normal. Abdomen is soft and nontender. Bowel sounds are positive. There is

no hepatomegaly. No costovertebral angle (CVA) tenderness. Extremities show no

pretibial edema, no calf tenderness, no differential swelling of the upper

extremities. Skin is warm, dry, and perfused without cyanosis or mottling,

including that of the nailbeds and knees. Neck is supple. There is no jugular

venous distention. No subcutaneous emphysema. Trachea is midline. Mouth shows

the mucous membranes to be pink and moist. Lips and commissures without

lesions. No thrush. Eyes show his pupils to be equal and reactive. Extraocular

motion intact. Sclerae anicteric. Neurologic shows II-XII intact. Normal gross

motor, gross sensation intact. Gait is not tested. Psychiatric shows him to be

awake, alert, and oriented times three with appropriate mood and affect and

conversational.



His white count today is 14.1 with a hemoglobin and hematocrit of 14.7 and

47.8. His platelet count is 182 and stable.



Chemistries show an elevated total CO2, which has been consistent throughout

his admission secondary to his pulmonary fibrosis and CO2 retention. His BUN

and creatinine are 19 and 0.63. Calcium is 8.4 with a glucose of 229. 



His chest x-ray is described above.



As there is no air leak, I am going to pull his chest tube in a sequential

fashion. I will remove the posterior chest tube today. I will temporarily place

the superior chest tube on suction but will discontinue it in about 4 hours. I

have thoroughly sealed the wound where his chest tube has been removed. 



IMPRESSION: 

1. Postoperative day #12 status post talc pleurodesis and bullectomy with wedge

resections.

2. Spontaneous pneumothorax on the right side, recurrent.

3. Alveolar pleural fistula, hopefully now resolved.

4. Advanced interstitial lung disease, clinically idiopathic pulmonary fibrosis.

5. Underlying chronic obstructive pulmonary disease (COPD).

6. Hypoxic polycythemia.

7. Gastroesophageal reflux disease.

8. Diabetes. 



PLAN AND DISCUSSION: See above. I will take out the inferior chest tube today.

## 2021-04-04 VITALS — SYSTOLIC BLOOD PRESSURE: 110 MMHG | DIASTOLIC BLOOD PRESSURE: 70 MMHG

## 2021-04-04 VITALS — DIASTOLIC BLOOD PRESSURE: 65 MMHG | SYSTOLIC BLOOD PRESSURE: 108 MMHG

## 2021-04-04 VITALS — SYSTOLIC BLOOD PRESSURE: 126 MMHG | DIASTOLIC BLOOD PRESSURE: 74 MMHG

## 2021-04-04 VITALS — SYSTOLIC BLOOD PRESSURE: 103 MMHG | DIASTOLIC BLOOD PRESSURE: 62 MMHG

## 2021-04-04 VITALS — DIASTOLIC BLOOD PRESSURE: 72 MMHG | SYSTOLIC BLOOD PRESSURE: 124 MMHG

## 2021-04-04 VITALS — OXYGEN SATURATION: 95 %

## 2021-04-04 VITALS — SYSTOLIC BLOOD PRESSURE: 102 MMHG | DIASTOLIC BLOOD PRESSURE: 65 MMHG

## 2021-04-04 LAB
BUN SERPL-MCNC: 19 MG/DL (ref 7–18)
CALCIUM SERPL-MCNC: 7.9 MG/DL (ref 8.8–10.2)
CHLORIDE SERPL-SCNC: 99 MEQ/L (ref 98–107)
CO2 SERPL-SCNC: 39 MEQ/L (ref 21–32)
CREAT SERPL-MCNC: 0.5 MG/DL (ref 0.7–1.3)
GFR SERPL CREATININE-BSD FRML MDRD: > 60 ML/MIN/{1.73_M2} (ref 49–?)
GLUCOSE SERPL-MCNC: 172 MG/DL (ref 70–100)
HCT VFR BLD AUTO: 43.3 % (ref 42–52)
HGB BLD-MCNC: 13.2 G/DL (ref 13.5–17.5)
MCH RBC QN AUTO: 29.1 PG (ref 27–33)
MCHC RBC AUTO-ENTMCNC: 30.5 G/DL (ref 32–36.5)
MCV RBC AUTO: 95.6 FL (ref 80–96)
PLATELET # BLD AUTO: 149 10^3/UL (ref 150–450)
POTASSIUM SERPL-SCNC: 4.1 MEQ/L (ref 3.5–5.1)
RBC # BLD AUTO: 4.53 10^6/UL (ref 4.3–6.1)
SODIUM SERPL-SCNC: 138 MEQ/L (ref 136–145)
WBC # BLD AUTO: 11.9 10^3/UL (ref 4–10)

## 2021-04-04 RX ADMIN — LEVALBUTEROL HYDROCHLORIDE SCH MG: 1.25 SOLUTION, CONCENTRATE RESPIRATORY (INHALATION) at 07:14

## 2021-04-04 RX ADMIN — INSULIN LISPRO SCH UNITS: 100 INJECTION, SOLUTION INTRAVENOUS; SUBCUTANEOUS at 12:40

## 2021-04-04 RX ADMIN — DOCUSATE SODIUM SCH MG: 100 CAPSULE, LIQUID FILLED ORAL at 09:00

## 2021-04-04 RX ADMIN — Medication SCH UNITS: at 20:46

## 2021-04-04 RX ADMIN — DOCUSATE SODIUM SCH MG: 100 CAPSULE, LIQUID FILLED ORAL at 19:43

## 2021-04-04 RX ADMIN — INSULIN LISPRO SCH UNITS: 100 INJECTION, SOLUTION INTRAVENOUS; SUBCUTANEOUS at 20:45

## 2021-04-04 RX ADMIN — SODIUM CHLORIDE SCH ML: 9 INJECTION, SOLUTION INTRAMUSCULAR; INTRAVENOUS; SUBCUTANEOUS at 14:22

## 2021-04-04 RX ADMIN — LEVALBUTEROL HYDROCHLORIDE SCH MG: 1.25 SOLUTION, CONCENTRATE RESPIRATORY (INHALATION) at 13:09

## 2021-04-04 RX ADMIN — HYDROXYCHLOROQUINE SULFATE SCH MG: 200 TABLET, FILM COATED ENTERAL at 20:45

## 2021-04-04 RX ADMIN — INSULIN LISPRO SCH UNITS: 100 INJECTION, SOLUTION INTRAVENOUS; SUBCUTANEOUS at 18:07

## 2021-04-04 RX ADMIN — SODIUM CHLORIDE SCH UNITS: 4.5 INJECTION, SOLUTION INTRAVENOUS at 09:53

## 2021-04-04 RX ADMIN — SODIUM CHLORIDE, PRESERVATIVE FREE SCH ML: 5 INJECTION INTRAVENOUS at 05:30

## 2021-04-04 RX ADMIN — HYDROXYCHLOROQUINE SULFATE SCH MG: 200 TABLET, FILM COATED ENTERAL at 09:54

## 2021-04-04 RX ADMIN — ATOVAQUONE SCH MG: 750 SUSPENSION ORAL at 09:53

## 2021-04-04 RX ADMIN — LEVALBUTEROL HYDROCHLORIDE SCH MG: 1.25 SOLUTION, CONCENTRATE RESPIRATORY (INHALATION) at 20:05

## 2021-04-04 RX ADMIN — MAGNESIUM HYDROXIDE SCH ML: 400 SUSPENSION ORAL at 09:00

## 2021-04-04 RX ADMIN — SODIUM CHLORIDE, PRESERVATIVE FREE SCH ML: 5 INJECTION INTRAVENOUS at 20:59

## 2021-04-04 RX ADMIN — SODIUM CHLORIDE SCH ML: 9 INJECTION, SOLUTION INTRAMUSCULAR; INTRAVENOUS; SUBCUTANEOUS at 05:31

## 2021-04-04 RX ADMIN — SODIUM CHLORIDE SCH ML: 9 INJECTION, SOLUTION INTRAMUSCULAR; INTRAVENOUS; SUBCUTANEOUS at 20:47

## 2021-04-04 RX ADMIN — SODIUM CHLORIDE SCH UNITS: 4.5 INJECTION, SOLUTION INTRAVENOUS at 20:45

## 2021-04-04 RX ADMIN — LEVALBUTEROL HYDROCHLORIDE SCH MG: 1.25 SOLUTION, CONCENTRATE RESPIRATORY (INHALATION) at 01:38

## 2021-04-04 RX ADMIN — INSULIN DETEMIR SCH UNITS: 100 INJECTION, SOLUTION SUBCUTANEOUS at 09:00

## 2021-04-04 RX ADMIN — SODIUM CHLORIDE, PRESERVATIVE FREE SCH ML: 5 INJECTION INTRAVENOUS at 13:22

## 2021-04-04 RX ADMIN — MIDODRINE HYDROCHLORIDE SCH MG: 5 TABLET ORAL at 09:54

## 2021-04-04 RX ADMIN — MIDODRINE HYDROCHLORIDE SCH MG: 5 TABLET ORAL at 12:40

## 2021-04-04 RX ADMIN — DEXTROSE MONOHYDRATE SCH MG: 50 INJECTION, SOLUTION INTRAVENOUS at 09:53

## 2021-04-04 RX ADMIN — MIDODRINE HYDROCHLORIDE SCH MG: 5 TABLET ORAL at 16:23

## 2021-04-04 RX ADMIN — Medication SCH UNITS: at 05:30

## 2021-04-04 RX ADMIN — INSULIN LISPRO SCH UNITS: 100 INJECTION, SOLUTION INTRAVENOUS; SUBCUTANEOUS at 09:52

## 2021-04-04 RX ADMIN — Medication SCH UNITS: at 14:22

## 2021-04-04 NOTE — REP
INDICATION:

R-sided chest tube, PF.



COMPARISON:

04/03/2021 PA and lateral chest.



TECHNIQUE:

Upright PA and lateral chest.



FINDINGS:

There is a single right thoracotomy tube.  The 2nd right thoracotomy tube on the

comparison study has been removed.

The a small right apical pneumothorax is again identified, not significantly changed.

Diffuse bilateral interstitial coarsening is unchanged.

Cardiac size is upper normal, unchanged.

There is a right subclavian central venous catheter with the tip in the right atrium

in satisfactory position, unchanged.





IMPRESSION:

One of the 2 right thoracotomy tubes has been removed.

There is a persisting small right apical pneumothorax.

There is no other interval change.





<Electronically signed by Richard Junior > 04/04/21 0750

## 2021-04-04 NOTE — IPNPDOC
Text Note


Date of Service


The patient was seen on 4/4/21.





NOTE


SUBJECTIVE: 


Patient is sitting upright on the side of the bed.  Beside some aching in his 

right side at the insertion of the chest tube, he doesn't have any additional 

complaints or requests at this time.





OBJECTIVE: 





PHYSICAL EXAM: 


VITAL SIGNS: Please see below 


GENERAL: In NAD, sitting upright in chair


LUNGS: Minimal crakles noted at the right lung base, but otherwise clear lung 

sounds at the apex of the right lung, and throughout the left side.  Chest tube 

is once again set to water seal at this time


CVS: S1 and S2, sinus rhythm. No murmurs, rubs or gallops.


GI:  Soft, nontender and nondistended. Positive bowel sounds.


CHEST: Right subclavian line 


BACK: chest tubes right side 


EXTREMITIES: No cyanosis, clubbing or pitting edema. 


: Deferred 


NEURO: CN 2-12 intact, no focal deficits. 


PSYCH: Mood and affect appropriate 





ASSESSMENT AND PLAN:  This is a 65-year-old male with a history of COPD, 

idiopathic pulmonary fibrosis, SLE managed by his rheumatologist, pulmonologist 

as an outpatient with Cytoxan planned for the future and chronic Plaquenil and 

prednisone, presented to the ER with shortness of breath, found to have 

spontaneous pneumothorax, status post chest tube on 03/05 to 03/08 with


recurrent pneumothorax, reinserted on 03/10, status post blood patch 03/17 and 

03/19/2021. Admitted for persistent right sided spontaneous pneumothorax , POD 

10 talc pleurodesis  03/22/21. 





PLAN: 





  1.  Spontaneous pneumothorax on the right with recurrence, status post blood 

patch on 03/17 and 03/19, POD 10 talc pleurodesis on 03/22.


  2.  COPD


  3.  Type 2 diabetes


  4.  History of idiopathic pulmonary fibrosis, 


  5.  SLE on chronic prednisone and Plaquenil. 


  6.  GERD


  7.  Alveolar pleural fistula.


  8.  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


  9.  Thrombocytopenia, improving with negative Heparin induced thrombocytopenia

panel.





DVT px: Heparin SC 





PLAN: No changes in medical regimen at this time. We will defer to cardiothora

cic surgery regarding the management of this chest tubes.


Physical therapy is already aware of his needs regarding home safety and are 

working toward those goals with him.  The patient is motivated to continue 

working with them





VSClarke, I+O


VS, Clarke, I+O


Laboratory Tests


4/3/21 09:27








4/4/21 05:25











Vital Signs








  Date Time  Temp Pulse Resp B/P (MAP) Pulse Ox O2 Delivery O2 Flow Rate FiO2


 


4/4/21 04:00       2.0 


 


4/4/21 04:00 97.1 74 20 126/74 (91) 99 Nasal Cannula  














I&O- Last 24 Hours up to 6 AM 


 


 4/4/21





 06:00


 


Intake Total 1435 ml


 


Output Total 82 ml


 


Balance 1353 ml

















OSIEL MCALLISTER DO                Apr 4, 2021 08:18

## 2021-04-05 VITALS — DIASTOLIC BLOOD PRESSURE: 66 MMHG | SYSTOLIC BLOOD PRESSURE: 119 MMHG

## 2021-04-05 VITALS — SYSTOLIC BLOOD PRESSURE: 110 MMHG | DIASTOLIC BLOOD PRESSURE: 69 MMHG

## 2021-04-05 VITALS — DIASTOLIC BLOOD PRESSURE: 62 MMHG | SYSTOLIC BLOOD PRESSURE: 103 MMHG

## 2021-04-05 VITALS — SYSTOLIC BLOOD PRESSURE: 94 MMHG | DIASTOLIC BLOOD PRESSURE: 55 MMHG

## 2021-04-05 VITALS — DIASTOLIC BLOOD PRESSURE: 51 MMHG | SYSTOLIC BLOOD PRESSURE: 94 MMHG

## 2021-04-05 VITALS — SYSTOLIC BLOOD PRESSURE: 105 MMHG | DIASTOLIC BLOOD PRESSURE: 69 MMHG

## 2021-04-05 LAB
BUN SERPL-MCNC: 20 MG/DL (ref 7–18)
CALCIUM SERPL-MCNC: 7.9 MG/DL (ref 8.8–10.2)
CHLORIDE SERPL-SCNC: 100 MEQ/L (ref 98–107)
CO2 SERPL-SCNC: 38 MEQ/L (ref 21–32)
CREAT SERPL-MCNC: 0.54 MG/DL (ref 0.7–1.3)
GFR SERPL CREATININE-BSD FRML MDRD: > 60 ML/MIN/{1.73_M2} (ref 49–?)
GLUCOSE SERPL-MCNC: 167 MG/DL (ref 70–100)
HCT VFR BLD AUTO: 44.2 % (ref 42–52)
HGB BLD-MCNC: 13.6 G/DL (ref 13.5–17.5)
MCH RBC QN AUTO: 29.6 PG (ref 27–33)
MCHC RBC AUTO-ENTMCNC: 30.8 G/DL (ref 32–36.5)
MCV RBC AUTO: 96.3 FL (ref 80–96)
PLATELET # BLD AUTO: 166 10^3/UL (ref 150–450)
POTASSIUM SERPL-SCNC: 4.5 MEQ/L (ref 3.5–5.1)
RBC # BLD AUTO: 4.59 10^6/UL (ref 4.3–6.1)
SODIUM SERPL-SCNC: 139 MEQ/L (ref 136–145)
WBC # BLD AUTO: 10.5 10^3/UL (ref 4–10)

## 2021-04-05 RX ADMIN — SODIUM CHLORIDE, PRESERVATIVE FREE SCH ML: 5 INJECTION INTRAVENOUS at 14:00

## 2021-04-05 RX ADMIN — INSULIN LISPRO SCH UNITS: 100 INJECTION, SOLUTION INTRAVENOUS; SUBCUTANEOUS at 07:30

## 2021-04-05 RX ADMIN — INSULIN LISPRO SCH UNITS: 100 INJECTION, SOLUTION INTRAVENOUS; SUBCUTANEOUS at 21:28

## 2021-04-05 RX ADMIN — LEVALBUTEROL HYDROCHLORIDE SCH MG: 1.25 SOLUTION, CONCENTRATE RESPIRATORY (INHALATION) at 08:00

## 2021-04-05 RX ADMIN — HYDROXYCHLOROQUINE SULFATE SCH MG: 200 TABLET, FILM COATED ENTERAL at 21:24

## 2021-04-05 RX ADMIN — Medication SCH UNITS: at 12:43

## 2021-04-05 RX ADMIN — LEVALBUTEROL HYDROCHLORIDE SCH MG: 1.25 SOLUTION, CONCENTRATE RESPIRATORY (INHALATION) at 02:00

## 2021-04-05 RX ADMIN — INSULIN LISPRO SCH UNITS: 100 INJECTION, SOLUTION INTRAVENOUS; SUBCUTANEOUS at 12:43

## 2021-04-05 RX ADMIN — HYDROXYCHLOROQUINE SULFATE SCH MG: 200 TABLET, FILM COATED ENTERAL at 09:34

## 2021-04-05 RX ADMIN — MIDODRINE HYDROCHLORIDE SCH MG: 5 TABLET ORAL at 12:42

## 2021-04-05 RX ADMIN — SODIUM CHLORIDE SCH UNITS: 4.5 INJECTION, SOLUTION INTRAVENOUS at 21:27

## 2021-04-05 RX ADMIN — Medication SCH UNITS: at 21:27

## 2021-04-05 RX ADMIN — INSULIN DETEMIR SCH UNITS: 100 INJECTION, SOLUTION SUBCUTANEOUS at 09:00

## 2021-04-05 RX ADMIN — MIDODRINE HYDROCHLORIDE SCH MG: 5 TABLET ORAL at 09:35

## 2021-04-05 RX ADMIN — SODIUM CHLORIDE SCH ML: 9 INJECTION, SOLUTION INTRAMUSCULAR; INTRAVENOUS; SUBCUTANEOUS at 21:27

## 2021-04-05 RX ADMIN — SODIUM CHLORIDE SCH ML: 9 INJECTION, SOLUTION INTRAMUSCULAR; INTRAVENOUS; SUBCUTANEOUS at 04:21

## 2021-04-05 RX ADMIN — LEVALBUTEROL HYDROCHLORIDE SCH MG: 1.25 SOLUTION, CONCENTRATE RESPIRATORY (INHALATION) at 14:03

## 2021-04-05 RX ADMIN — DEXTROSE MONOHYDRATE SCH MG: 50 INJECTION, SOLUTION INTRAVENOUS at 09:34

## 2021-04-05 RX ADMIN — SODIUM CHLORIDE SCH UNITS: 4.5 INJECTION, SOLUTION INTRAVENOUS at 09:34

## 2021-04-05 RX ADMIN — INSULIN LISPRO SCH UNITS: 100 INJECTION, SOLUTION INTRAVENOUS; SUBCUTANEOUS at 17:02

## 2021-04-05 RX ADMIN — CALCIUM POLYCARBOPHIL SCH EA: 625 TABLET, FILM COATED ORAL at 09:00

## 2021-04-05 RX ADMIN — SODIUM CHLORIDE, PRESERVATIVE FREE SCH ML: 5 INJECTION INTRAVENOUS at 21:28

## 2021-04-05 RX ADMIN — Medication SCH UNITS: at 04:21

## 2021-04-05 RX ADMIN — MAGNESIUM HYDROXIDE SCH ML: 400 SUSPENSION ORAL at 09:00

## 2021-04-05 RX ADMIN — LEVALBUTEROL HYDROCHLORIDE SCH MG: 1.25 SOLUTION, CONCENTRATE RESPIRATORY (INHALATION) at 20:00

## 2021-04-05 RX ADMIN — FLUTICASONE PROPIONATE AND SALMETEROL XINAFOATE SCH PUFF: 115; 21 AEROSOL, METERED RESPIRATORY (INHALATION) at 20:52

## 2021-04-05 RX ADMIN — DOCUSATE SODIUM SCH MG: 100 CAPSULE, LIQUID FILLED ORAL at 09:00

## 2021-04-05 RX ADMIN — ATOVAQUONE SCH MG: 750 SUSPENSION ORAL at 09:34

## 2021-04-05 RX ADMIN — SODIUM CHLORIDE, PRESERVATIVE FREE SCH ML: 5 INJECTION INTRAVENOUS at 04:21

## 2021-04-05 RX ADMIN — MIDODRINE HYDROCHLORIDE SCH MG: 5 TABLET ORAL at 17:02

## 2021-04-05 RX ADMIN — SODIUM CHLORIDE SCH ML: 9 INJECTION, SOLUTION INTRAMUSCULAR; INTRAVENOUS; SUBCUTANEOUS at 14:56

## 2021-04-05 NOTE — REP
INDICATION:

R-sided chest tube, PF.



COMPARISON:

PA and lateral chest dated 04/04/2021.



TECHNIQUE:

Upright PA and lateral chest.



FINDINGS:

The right thoracotomy tube is been removed.

There is a small right apical pneumothorax, decreased in size.

Diffuse bilateral interstitial coarsening is unchanged.

Cardiac size is upper normal, unchanged.

The right subclavian central venous catheter is unchanged with the tip in the right

atrium.





IMPRESSION:

The right thoracotomy tube has been removed.

The small right apical pneumothorax has slightly decreased in size.

No other interval change.





<Electronically signed by Richard Junior > 04/05/21 1031

## 2021-04-05 NOTE — IPNPDOC
Text Note


Date of Service


The patient was seen on 4/5/21.





NOTE


SUBJECTIVE: 


The patient was just exiting the restroom when I entered. It appears that he is 

able to move about fairly well with the assistance of a 4 wheeled walker with a 

seat.  Other than some mild aching in the right chest at the previous site of 

his chest tube, His only complaint today is that of soft stools, he has been 

refusing his stool softeners for many days now.  He does get quite winded with 

exertion, and he continues working with physical therapy towards a goal of being

able to go home. 





OBJECTIVE: 





PHYSICAL EXAM: 


VITAL SIGNS: Please see below 


GENERAL: Awake, alert, oriented 3.


LUNGS: Minimal crackles at the bases, more so on the right. Large dressing noted

on the right chest wall, but chest tubes have now been removed.


CVS: S1 and S2, sinus rhythm. No murmurs, rubs or gallops.


GI:  Soft, nontender and nondistended. Positive bowel sounds.


CHEST: Right subclavian line 


BACK: chest tubes right side 


EXTREMITIES: No cyanosis, clubbing or pitting edema. 


: Deferred 


NEURO: CN 2-12 intact, no focal deficits. 


PSYCH: Mood and affect appropriate 





ASSESSMENT AND PLAN:  This is a 65-year-old male with a history of COPD, 

idiopathic pulmonary fibrosis, SLE managed by his rheumatologist, pulmonologist 

as an outpatient with Cytoxan planned for the future and chronic Plaquenil and 

prednisone, presented to the ER with shortness of breath, found to have 

spontaneous pneumothorax, status post chest tube on 03/05 to 03/08 with


recurrent pneumothorax, reinserted on 03/10, status post blood patch 03/17 and 

03/19/2021. Admitted for persistent right sided spontaneous pneumothorax , POD 

10 talc pleurodesis  03/22/21. 





PLAN: 





  1.  Spontaneous pneumothorax on the right with recurrence, status post blood 

patch on 03/17 and 03/19, POD 10 talc pleurodesis on 03/22.


  2.  COPD


  3.  Type 2 diabetes


  4.  History of idiopathic pulmonary fibrosis, 


  5.  SLE on chronic prednisone and Plaquenil. 


  6.  GERD


  7.  Alveolar pleural fistula.


  8.  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


  9.  Thrombocytopenia, improving with negative Heparin induced thrombocytopenia

panel.





DVT px: Heparin SC 





PLAN: 


-Continue prednisone 10 mg daily for now


-I will restart his home dose of inhaled corticosteroid and inhaled tiotropium 

bromide


-I will start him on FiberCon fiber supplement to help with soft/loose stools. 

Stool softeners were discontinued.


- Physical therapy has been working with him, it is their recommendation that he

should go home with a rolling walker with 4 wheels and a seat due to mobility 

related deficit that impairs his ADLs. He has demonstrated that he can safely 

use a walker, and using said style of walker and does sufficiently aid him with 

his mobility issues





VS,Clarke, I+O


VS, Clarke, I+O


Laboratory Tests


4/5/21 04:20











Vital Signs








  Date Time  Temp Pulse Resp B/P (MAP) Pulse Ox O2 Delivery O2 Flow Rate FiO2


 


4/5/21 04:00 97.6 80 18 119/66 (83) 98 Nasal Cannula 2.0 














I&O- Last 24 Hours up to 6 AM 


 


 4/5/21





 06:00


 


Intake Total 840 ml


 


Output Total 25 ml


 


Balance 815 ml

















OSIEL MCALLISTER DO                Apr 5, 2021 07:24

## 2021-04-05 NOTE — IPN
PROGRESS NOTE



DATE:  04/04/2021



SUBJECTIVE: This is now the 13th postoperative day for Mr. Christine. Today is

going to be a red letter day for him as I am going to remove his chest tubes as

he has no air leak, and his chest x-ray is unchanged from yesterday without

subcutaneous emphysema. 



OBJECTIVE: 

VITAL SIGNS: Show a T-max of 98.3 with a heart rate that ranges between 74 and

80 in sinus rhythm. Respiratory rate of 20 to 22 without the use of accessory

muscles who is 96% to 99% saturated on 2 liters nasal cannula and whose blood

pressure is ranging between 110/70 to 126/74.  

INTAKE AND OUTPUT: Over the past 24 hours has been recorded as 5055 in and 98

out with five voids, which have not been caught. His weight today is 68.1 kg

compared to 69.3 kg yesterday. 

RESPIRATORY: He has the bilateral crackles on either side. Percussion notes are

full to the diaphragm. 

CARDIAC: Without murmurs, clicks, gallops, or rubs. I cannot feel his PMI. S1

and S2 are normal. 

ABDOMEN: Soft and nontender. Bowel sounds are positive. There is no

hepatomegaly. No CVA tenderness. 

EXTREMITIES: Show no pretibial edema. No calf tenderness. No differential

swelling of the upper extremities. 

SKIN: Warm, dry, and perfused without cyanosis or mottling, including that of

the nail beds and knees. 

NECK: Supple. There is no jugular venous distention. No subcutaneous emphysema.

Trachea is midline.

MOUTH: Shows the mucous membranes to be pink and moist. Lips and commisures are

without lesions and no thrush.

EYES: Show his pupils equal and reactive. Extraocular muscles are intact.

Sclerae nonicteric. 

NEUROLOGIC: Shows II through XII intact. Normal gross motor, gross sensation

intact. Gait is not tested. 

PSYCHIATRIC: Shows him to be awake, alert, and oriented x3 with appropriate

mood and affect and conversational. 



LABORATORY DATA: His white count today is 11.9 down from 14.1 yesterday.

Hemoglobin and hematocrit are 13.2 and 43.2 respectively with a platelet count

of 149,000 and stable. Chemistries show the same elevation of his total CO2 to

39 with the remainder of his electrolytes normal. BUN and creatinine are 19 and

0.5 with a glucose of 172 and a calcium of 7.9. 



IMAGING DATA: His chest x-ray is unchanged from yesterday in fact maybe

improved. There is still a small apical air space at the cubital of the lung.

One chest tube is remaining. Costophrenic angles are sharp. He has the

reticular pattern of his pulmonary fibrosis. 



IMPRESSION:

1.  Postoperative day #13 status post talc pleurodesis and bullectomy with

    wedge resections. 

2.  Spontaneous pneumothorax on the right side recurrent. 

3.  Alveolopleural fistula resolved.

4.  Advanced interstitial lung disease clinically idiopathic pulmonary

    fibrosis. 

5.  Underlying chronic obstructive pulmonary disease (COPD). 

6.  Polycythemia. 

7.  Gastroesophageal reflux disease.

8.  Diabetes. 



PLAN AND DISCUSSION: I will remove his remaining chest tube today. I removed

his suture tags and his staples in the prior chest tube site. He has a bathroom

on the second floor and he can hardly get up his stairs secondary to pulmonary

fibrosis. He will need active patient family services involvement to either

change his living situation or enroll him in long term rehab. His problem is

basically breathing and extreme dyspnea from his pulmonary fibrosis and not

overwhelming muscle weakness. I do not believe that this is really a fixable

situation as far as his pulmonary fibrosis is concerned and his shortness of

breath.

## 2021-04-05 NOTE — IPN
PROGRESS NOTE



DATE:  04/05/2021



SUBJECTIVE: This is now the 14th postoperative day for Mr. Christine. His remaining

chest tube was removed yesterday and his chest x-ray today shows his lung to be

expanded to the chest wall; except for a very small unchanging air space in the

superolateral aspect on the chest x-ray. 



OBJECTIVE: 

VITAL SIGNS: Show a T-max of 98.6 with a heart rate that ranges between 80 and

93 in sinus rhythm. Respiratory rate of 18 to 22 without the use of accessory

muscles who is 93% to 98% saturated on 2 liters nasal cannula and whose blood

pressure is ranging between 94/51 to 119/66. 

INTAKE AND OUTPUT: Over the past 24 hours has been recorded as 840 in and 37

out with three voids unmeasured. His weight today is 68.1 kg, which is the same

as yesterday. 

RESPIRATORY: He has the diffuse crackles consistent with his pulmonary

fibrosis. I do think I still hear a pleural friction rub on the right.

Percussion note is full to the diaphragm. 

CARDIAC: Without murmurs, clicks, gallops, or rubs. I cannot feel his PMI. S1

and S2 are normal. 

ABDOMEN: Soft and nontender. Bowel sounds are positive. There is no

hepatomegaly. No CVA tenderness. 

EXTREMITIES: Show no pretibial edema. No calf tenderness. No differential

swelling of the upper extremities. 

SKIN: Warm, dry, and perfused without cyanosis or mottling, including that of

the nail beds and knees. 

NECK: Supple. There is no jugular venous distention. No subcutaneous emphysema.

Trachea is midline.

MOUTH: Shows the mucous membranes to be pink and moist. Lips and commisures are

without lesions and no thrush.

EYES: Show his pupils equal and reactive. Extraocular muscles are intact.

Sclerae nonicteric. 

NEUROLOGIC: Shows II through XII intact. Normal gross motor, gross sensation

intact. Gait is not tested. 

PSYCHIATRIC: Shows him to be awake, alert, and oriented x3 with appropriate

mood and affect and conversational. 



LABORATORY DATA: His white count today is 10.5 with hemoglobin and hematocrit

of 13.6 and 44.2 respectively. Platelet count is 166,000. His chemistries show

normal electrolytes with an elevated total CO2 of 38 consistent with his

pulmonary fibrosis. BUN and creatinine are 20 and 0.54 with a glucose of 167

and a potassium of 7.9. 



IMAGING DATA: His chest x-ray is as described above. Costophrenic angles are

sharp and he has the reticular pattern of his pulmonary fibrosis. 



IMPRESSION:

1.  Postoperative day #14 status post bullectomy, wedge resections, and talc

    pleurodesis. 

2.  Spontaneous pneumothorax on the right recurrent. 

3.  Alveolopleural fistula resolved. 

4.  Advanced interstitial lung disease clinically idiopathic pulmonary fibrosis.

5.  Underlying chronic obstructive pulmonary disease (COPD). 

6.  Hypoxic polycythemia.

7.  Gastroesophageal reflux disease. 

8.  Diabetes.



PLAN AND DISCUSSION: From a surgical point of view, I am very gratified that

his lung has now remained up. As discussed in prior notes, he has difficulty

navigating his house as he has to go up stairs and he cannot climb the stairs

because of shortness of breath. He did ask me today how severe his pulmonary

fibrosis is and I have indicated that it is very advanced disease. He further

asked me how long he has to survive and I told him that I do not have that

knowledge. Dr. Benitez has told him between six months and a year. 



I will keep his dressing on today and remove tomorrow as I want the wound to

completely seal, so as he does not suck air into his chest. There are still two

sutures that I need to remove, but I will keep them in for the next week or so.

## 2021-04-06 VITALS — SYSTOLIC BLOOD PRESSURE: 126 MMHG | DIASTOLIC BLOOD PRESSURE: 74 MMHG

## 2021-04-06 VITALS — SYSTOLIC BLOOD PRESSURE: 104 MMHG | DIASTOLIC BLOOD PRESSURE: 69 MMHG

## 2021-04-06 VITALS — SYSTOLIC BLOOD PRESSURE: 147 MMHG | DIASTOLIC BLOOD PRESSURE: 86 MMHG

## 2021-04-06 VITALS — DIASTOLIC BLOOD PRESSURE: 82 MMHG | SYSTOLIC BLOOD PRESSURE: 120 MMHG

## 2021-04-06 VITALS — SYSTOLIC BLOOD PRESSURE: 105 MMHG | DIASTOLIC BLOOD PRESSURE: 64 MMHG

## 2021-04-06 VITALS — SYSTOLIC BLOOD PRESSURE: 128 MMHG | DIASTOLIC BLOOD PRESSURE: 88 MMHG

## 2021-04-06 VITALS — SYSTOLIC BLOOD PRESSURE: 110 MMHG | DIASTOLIC BLOOD PRESSURE: 71 MMHG

## 2021-04-06 VITALS — SYSTOLIC BLOOD PRESSURE: 116 MMHG | DIASTOLIC BLOOD PRESSURE: 85 MMHG

## 2021-04-06 VITALS — DIASTOLIC BLOOD PRESSURE: 88 MMHG | SYSTOLIC BLOOD PRESSURE: 129 MMHG

## 2021-04-06 VITALS — DIASTOLIC BLOOD PRESSURE: 89 MMHG | SYSTOLIC BLOOD PRESSURE: 130 MMHG

## 2021-04-06 VITALS — SYSTOLIC BLOOD PRESSURE: 117 MMHG | DIASTOLIC BLOOD PRESSURE: 73 MMHG

## 2021-04-06 VITALS — DIASTOLIC BLOOD PRESSURE: 96 MMHG | SYSTOLIC BLOOD PRESSURE: 143 MMHG

## 2021-04-06 VITALS — SYSTOLIC BLOOD PRESSURE: 129 MMHG | DIASTOLIC BLOOD PRESSURE: 73 MMHG

## 2021-04-06 VITALS — DIASTOLIC BLOOD PRESSURE: 72 MMHG | SYSTOLIC BLOOD PRESSURE: 108 MMHG

## 2021-04-06 VITALS — SYSTOLIC BLOOD PRESSURE: 117 MMHG | DIASTOLIC BLOOD PRESSURE: 85 MMHG

## 2021-04-06 VITALS — DIASTOLIC BLOOD PRESSURE: 66 MMHG | SYSTOLIC BLOOD PRESSURE: 104 MMHG

## 2021-04-06 VITALS — SYSTOLIC BLOOD PRESSURE: 103 MMHG | DIASTOLIC BLOOD PRESSURE: 71 MMHG

## 2021-04-06 VITALS — SYSTOLIC BLOOD PRESSURE: 110 MMHG | DIASTOLIC BLOOD PRESSURE: 74 MMHG

## 2021-04-06 VITALS — SYSTOLIC BLOOD PRESSURE: 106 MMHG | DIASTOLIC BLOOD PRESSURE: 64 MMHG

## 2021-04-06 VITALS — DIASTOLIC BLOOD PRESSURE: 89 MMHG | SYSTOLIC BLOOD PRESSURE: 154 MMHG

## 2021-04-06 VITALS — DIASTOLIC BLOOD PRESSURE: 89 MMHG | SYSTOLIC BLOOD PRESSURE: 131 MMHG

## 2021-04-06 VITALS — DIASTOLIC BLOOD PRESSURE: 74 MMHG | SYSTOLIC BLOOD PRESSURE: 110 MMHG

## 2021-04-06 VITALS — SYSTOLIC BLOOD PRESSURE: 128 MMHG | DIASTOLIC BLOOD PRESSURE: 70 MMHG

## 2021-04-06 VITALS — SYSTOLIC BLOOD PRESSURE: 129 MMHG | DIASTOLIC BLOOD PRESSURE: 79 MMHG

## 2021-04-06 VITALS — DIASTOLIC BLOOD PRESSURE: 92 MMHG | SYSTOLIC BLOOD PRESSURE: 152 MMHG

## 2021-04-06 VITALS — DIASTOLIC BLOOD PRESSURE: 71 MMHG | SYSTOLIC BLOOD PRESSURE: 123 MMHG

## 2021-04-06 VITALS — SYSTOLIC BLOOD PRESSURE: 109 MMHG | DIASTOLIC BLOOD PRESSURE: 66 MMHG

## 2021-04-06 VITALS — DIASTOLIC BLOOD PRESSURE: 87 MMHG | SYSTOLIC BLOOD PRESSURE: 138 MMHG

## 2021-04-06 VITALS — DIASTOLIC BLOOD PRESSURE: 76 MMHG | SYSTOLIC BLOOD PRESSURE: 129 MMHG

## 2021-04-06 VITALS — DIASTOLIC BLOOD PRESSURE: 79 MMHG | SYSTOLIC BLOOD PRESSURE: 129 MMHG

## 2021-04-06 VITALS — SYSTOLIC BLOOD PRESSURE: 117 MMHG | DIASTOLIC BLOOD PRESSURE: 68 MMHG

## 2021-04-06 VITALS — SYSTOLIC BLOOD PRESSURE: 126 MMHG | DIASTOLIC BLOOD PRESSURE: 75 MMHG

## 2021-04-06 VITALS — DIASTOLIC BLOOD PRESSURE: 87 MMHG | SYSTOLIC BLOOD PRESSURE: 148 MMHG

## 2021-04-06 VITALS — DIASTOLIC BLOOD PRESSURE: 67 MMHG | SYSTOLIC BLOOD PRESSURE: 128 MMHG

## 2021-04-06 VITALS — DIASTOLIC BLOOD PRESSURE: 72 MMHG | SYSTOLIC BLOOD PRESSURE: 115 MMHG

## 2021-04-06 VITALS — SYSTOLIC BLOOD PRESSURE: 143 MMHG | DIASTOLIC BLOOD PRESSURE: 96 MMHG

## 2021-04-06 VITALS — DIASTOLIC BLOOD PRESSURE: 90 MMHG | SYSTOLIC BLOOD PRESSURE: 144 MMHG

## 2021-04-06 VITALS — DIASTOLIC BLOOD PRESSURE: 63 MMHG | SYSTOLIC BLOOD PRESSURE: 104 MMHG

## 2021-04-06 VITALS — SYSTOLIC BLOOD PRESSURE: 108 MMHG | DIASTOLIC BLOOD PRESSURE: 72 MMHG

## 2021-04-06 LAB
APTT BLD: 31.4 SECONDS (ref 24.2–38.5)
BUN SERPL-MCNC: 18 MG/DL (ref 7–18)
CALCIUM SERPL-MCNC: 8.4 MG/DL (ref 8.8–10.2)
CHLORIDE SERPL-SCNC: 101 MEQ/L (ref 98–107)
CO2 SERPL-SCNC: 36 MEQ/L (ref 21–32)
CREAT SERPL-MCNC: 0.64 MG/DL (ref 0.7–1.3)
GFR SERPL CREATININE-BSD FRML MDRD: > 60 ML/MIN/{1.73_M2} (ref 49–?)
GLUCOSE SERPL-MCNC: 191 MG/DL (ref 70–100)
HCT VFR BLD AUTO: 46.7 % (ref 42–52)
HGB BLD-MCNC: 14.3 G/DL (ref 13.5–17.5)
INR PPP: 0.81
MCH RBC QN AUTO: 29.4 PG (ref 27–33)
MCHC RBC AUTO-ENTMCNC: 30.6 G/DL (ref 32–36.5)
MCV RBC AUTO: 96.1 FL (ref 80–96)
PLATELET # BLD AUTO: 176 10^3/UL (ref 150–450)
POTASSIUM SERPL-SCNC: 4.4 MEQ/L (ref 3.5–5.1)
PROTHROMBIN TIME: 11.4 SECONDS (ref 12.5–14.3)
RBC # BLD AUTO: 4.86 10^6/UL (ref 4.3–6.1)
SODIUM SERPL-SCNC: 138 MEQ/L (ref 136–145)
WBC # BLD AUTO: 12.4 10^3/UL (ref 4–10)

## 2021-04-06 RX ADMIN — LEVALBUTEROL HYDROCHLORIDE SCH MG: 1.25 SOLUTION, CONCENTRATE RESPIRATORY (INHALATION) at 01:35

## 2021-04-06 RX ADMIN — MIDODRINE HYDROCHLORIDE SCH MG: 5 TABLET ORAL at 11:53

## 2021-04-06 RX ADMIN — SODIUM CHLORIDE, PRESERVATIVE FREE SCH ML: 5 INJECTION INTRAVENOUS at 14:59

## 2021-04-06 RX ADMIN — HYDROXYCHLOROQUINE SULFATE SCH MG: 200 TABLET, FILM COATED ENTERAL at 09:06

## 2021-04-06 RX ADMIN — INSULIN LISPRO SCH UNITS: 100 INJECTION, SOLUTION INTRAVENOUS; SUBCUTANEOUS at 17:30

## 2021-04-06 RX ADMIN — INSULIN DETEMIR SCH UNITS: 100 INJECTION, SOLUTION SUBCUTANEOUS at 09:05

## 2021-04-06 RX ADMIN — MIDODRINE HYDROCHLORIDE SCH MG: 5 TABLET ORAL at 16:00

## 2021-04-06 RX ADMIN — Medication SCH UNITS: at 06:11

## 2021-04-06 RX ADMIN — INSULIN LISPRO SCH UNITS: 100 INJECTION, SOLUTION INTRAVENOUS; SUBCUTANEOUS at 20:02

## 2021-04-06 RX ADMIN — CALCIUM POLYCARBOPHIL SCH EA: 625 TABLET, FILM COATED ORAL at 09:00

## 2021-04-06 RX ADMIN — TIOTROPIUM BROMIDE SCH INHALATION: 18 CAPSULE ORAL; RESPIRATORY (INHALATION) at 09:00

## 2021-04-06 RX ADMIN — HYDROXYCHLOROQUINE SULFATE SCH MG: 200 TABLET, FILM COATED ENTERAL at 21:18

## 2021-04-06 RX ADMIN — INSULIN LISPRO SCH UNITS: 100 INJECTION, SOLUTION INTRAVENOUS; SUBCUTANEOUS at 09:06

## 2021-04-06 RX ADMIN — FLUTICASONE PROPIONATE AND SALMETEROL XINAFOATE SCH PUFF: 115; 21 AEROSOL, METERED RESPIRATORY (INHALATION) at 20:25

## 2021-04-06 RX ADMIN — MIDODRINE HYDROCHLORIDE SCH MG: 5 TABLET ORAL at 09:07

## 2021-04-06 RX ADMIN — LEVALBUTEROL HYDROCHLORIDE SCH MG: 1.25 SOLUTION, CONCENTRATE RESPIRATORY (INHALATION) at 20:00

## 2021-04-06 RX ADMIN — SODIUM CHLORIDE, PRESERVATIVE FREE SCH ML: 5 INJECTION INTRAVENOUS at 21:18

## 2021-04-06 RX ADMIN — SODIUM CHLORIDE, PRESERVATIVE FREE SCH ML: 5 INJECTION INTRAVENOUS at 06:00

## 2021-04-06 RX ADMIN — SODIUM CHLORIDE SCH UNITS: 4.5 INJECTION, SOLUTION INTRAVENOUS at 09:06

## 2021-04-06 RX ADMIN — INSULIN LISPRO SCH UNITS: 100 INJECTION, SOLUTION INTRAVENOUS; SUBCUTANEOUS at 11:54

## 2021-04-06 RX ADMIN — DEXTROSE MONOHYDRATE PRN ML: 25 INJECTION, SOLUTION INTRAVENOUS at 19:13

## 2021-04-06 RX ADMIN — LEVALBUTEROL HYDROCHLORIDE SCH MG: 1.25 SOLUTION, CONCENTRATE RESPIRATORY (INHALATION) at 08:00

## 2021-04-06 RX ADMIN — ATOVAQUONE SCH MG: 750 SUSPENSION ORAL at 09:06

## 2021-04-06 RX ADMIN — FLUTICASONE PROPIONATE AND SALMETEROL XINAFOATE SCH PUFF: 115; 21 AEROSOL, METERED RESPIRATORY (INHALATION) at 08:00

## 2021-04-06 RX ADMIN — LEVALBUTEROL HYDROCHLORIDE SCH MG: 1.25 SOLUTION, CONCENTRATE RESPIRATORY (INHALATION) at 12:26

## 2021-04-06 RX ADMIN — SODIUM CHLORIDE SCH ML: 9 INJECTION, SOLUTION INTRAMUSCULAR; INTRAVENOUS; SUBCUTANEOUS at 06:11

## 2021-04-06 NOTE — REP
INDICATION:

Right arm weakness started now.



COMPARISON:

None.



TECHNIQUE:

Axial CT images with multiplanar reformations.



FINDINGS:

No acute bleed or acute large vessel territorial infarct.  Ventricles, cisterns and

sulci are within normal limits.  No mass effect or midline shift.  No abnormal fluid

collections.



Paranasal sinuses and mastoid air cells are clear.



IMPRESSION:

No acute findings.  No acute bleed or large vessel territorial infarct.

Age-related volume loss and white matter changes.





<Electronically signed by Franco Harper > 04/06/21 [id number]

## 2021-04-06 NOTE — REP
INDICATION:

Dyspnea.



COMPARISON:

PA and lateral chest at 8:03 a.m. earlier today.



FINDINGS:

There is no right thoracotomy tube. This is unchanged.

There is a small right apical pneumothorax, not significantly changed.

There are diffuse bilateral heavy interstitial markings, unchanged.

There are no new infiltrates or pleural effusions.

Cardiac size is enlarged, unchanged.

The previous right subclavian central venous catheter has been removed.





IMPRESSION:

The right subclavian central venous catheter is been removed in the interim from the

comparison study earlier today.

There is no other interval change.

There are no new infiltrates or pleural effusions.

There is a small right apical pneumothorax, unchanged in size.

There are diffusely heavy chronic interstitial markings, unchanged.





<Electronically signed by Richard Junior > 04/06/21 [id number]

## 2021-04-06 NOTE — IPNPDOC
Subjective


Date Seen


The patient was seen on 4/6/21.





Subjective


Chief Complaint/HPI


Mr. Christine is a 65 year old male with pulmonary interstitial fibrosis here with 

spontaneous pneumothorax of the right lung. This morning, he denied any 

worsening dyspnea or chest pain. He was ambulating well.  was 

finding a single floor unit for patient to live in as he could not yet climb 

stairs. Around 12:20PM, he had become hypoxic and required 5L. He responding to 

breathing treatments. Then around 12:30PM, he could not move his right arm or 

right leg. At that time left leg was able to move. After imaging, he was not 

able to move left leg. He had facial droop on the right, but no change in 

speech. He was able to lift eyebrows and he was alert. NIH stroke scale of about

15. Ordered for CT head which return negative. PT/INR and PTT were within normal

limits. Spoke with Dr. Hay who looked at the repeat CXR and told me from his

standpoint, he was okay with TPA. Spoke with neurology (Dr. Kaba) about 

TPA. Recommended calling [facility] as patient has high risk of intrathoracic 

hemorrhage. I reached out to [facility] around 2PM. I spoke with the stroke 

unit, Dr. Wade around 2:30pm. By the time patient reaches their facility to 

receive TPA, patient would have missed the window. He did not recommend transfer

of patient, but did recommend CT head with angio. If there was large vessel 

occlusion, patient may be urgently transferred, but if there was not, then we 

would have to decide on TPA. CT head with angio was negative. I spoke with Dr. Kaba who recommended discussing with the patient. I discuss with patient 

risks and benefits. Patient may regain function of arm and legs, but it was not 

guaranteed. There was a risk of intrathoracic hemorrhage due to recent chest 

tube. Nurse, Audelia CÁRDENAS was present during his conversation. Patient was 

agreeable to TPA despite risks. Patient receiving TPA today





Objective


Physical Examination


General Exam:  Positive: Cooperative, Other (Fatigued)


Eye Exam:  Positive: EOMI; 


   Negative: Sclera icteric


ENT Exam:  Positive: Atraumatic


Chest Exam:  Positive: Clear to auscultation


Heart Exam:  Positive: Rate Normal, Regular Rhythm


Abdomen Exam:  Positive: Normal bowel sounds, Soft; 


   Negative: Tenderness


Extremity Exam:  Negative: Edema


Neuro Exam:  Positive: Normal Speech; 


   Negative: Strength at 5/5 X4 ext (Cannot lift right arm, right leg, left leg,

against gravitiy. )


Psych Exam:  Positive: Mental status NL





Assessment /Plan


Assessment


Mr. Christine is a 65 year old male with pulmonary interstitial fibrosis here with 

spontaneous pneumothorax of the right lung. Patient had talc pleurodesis on 

3/22/2021 and removed chest tubes on 4/4/2021. On 4/6/2021, patient demonstrated

stroke like symptoms with right facial droop, right arm and leg weakness, and 

left leg weakness. Neurology consulted. Discussed with patient about risks and 

benefits of TPA and patient is agreeable. Will give TPA. Updated nephew and 

sister about events





Plan/VTE


VTE Prophylaxis Ordered?:  Yes





Plan


1. Spontaneous pneumothorax on the right with recurrence, s/p blood patch on 

3/17 and 3/19 and talc pleurodesis on 3/22


-Dr. Hay following recommendations appreciated


-Chest tubes removed on 4/4/2021





2. CVA


-On 4/6/2021, demonstrated right face, arm, and leg weakness along with left leg

weakness


-Neurology consulted, recommendations appreciated


-Risks and benefits discussed with patient, patient was agreeable to TPA


-TPA protocol ordered


-Will need MRI, echocardiogram, and monitor on Telemetry for arrhythmia





3. Diabetes mellitus


-Sliding scale insulin and basal insulin





4. Lupus


-Patient was scheduled for Cytoxan on 3/11/2021 per patient's Rheumatologist 

office in [place], [place]


-Dr. Benitez spoke with patient, patient request to defer Cytoxan until he has 

recovered


-Continue with prednisone and Plaquenil


Of note, due to patient's chronic and high-dose steroids, he is not a candidate

for the COVID vaccination at this time





5. GERD


-Continue pantoprazole





6. DVT ppx


-No heparin due to TPA





Disposition: Pending MRI and echocardiogram





VS, I&O, 24H, Fishbone


Vital Signs/I&O





Vital Signs








  Date Time  Temp Pulse Resp B/P (MAP) Pulse Ox O2 Delivery O2 Flow Rate FiO2


 


4/6/21 18:30 99.1 104 37 148/87 94 Nasal Cannula  


 


4/6/21 17:50       4.0 


 


4/6/21 16:52        














I&O- Last 24 Hours up to 6 AM 


 


 4/6/21





 06:00


 


Intake Total 1020 ml


 


Output Total 0 ml


 


Balance 1020 ml











Laboratory Data


24H LABS


Laboratory Tests 2


4/5/21 21:00: Bedside Glucose (Misc Panel) 322H


4/6/21 06:22: 


Nucleated Red Blood Cells % (auto) 0.0, Anion Gap 1L, Glomerular Filtration Rate

> 60.0, Calcium Level 8.4L


4/6/21 11:36: Bedside Glucose (Misc Panel) 207H


4/6/21 12:38: Bedside Glucose (Misc Panel) 187H


4/6/21 13:14: 


Prothrombin Time 11.4L, Prothromb Time International Ratio 0.81, Activated 

Partial Thromboplast Time 31.4


4/6/21 14:28: Bedside Glucose (Misc Panel) 60L


4/6/21 15:05: Bedside Glucose (Misc Panel) 148H


4/6/21 15:41: Bedside Glucose (Misc Panel) 101


4/6/21 19:09: Bedside Glucose (Misc Panel) 42L


CBC/BMP


Laboratory Tests


4/6/21 06:22

















TEO JACKSON DO                Apr 6, 2021 20:23

## 2021-04-06 NOTE — REP
INDICATION:

Neuro symptoms, suspect infarct.



COMPARISON:

CT of the head performed same day.



TECHNIQUE:

Contrast-enhanced CT angiogram of the neck with multiplanar reformations.



FINDINGS:

The vertebral arteries are codominant and present throughout the are cervical course.



On the right the common carotid artery and the internal carotid arteries are patent at

the bifurcation.  At the bifurcation on the right there is calcified plaque without

evidence of limiting stenosis.



On the left, there is suggestion of a soft plaque at the carotid bifurcation and a

small amount of calcification, the common and internal carotid arteries appear patent.



Spinal alignment within normal limits.  No fracture.  Canal and neural foramina appear

patent.



There are scattered opacifications within the bilateral lungs, with a pleural effusion

on the right and a smaller effusion on the left.



IMPRESSION:

1. Cervical vasculature without evidence of occlusion or high-grade stenosis.  There

appears to be mural plaquing at the carotid bifurcations bilaterally, with more

extensive soft plaque on the left.  This is seen on image 66 series 407

2. Bilateral pleural effusions and patchy opacities may be consistent with pulmonary

edema.







<Electronically signed by Franco Harper > 04/06/21 [id number]

## 2021-04-06 NOTE — IPN
PROGRESS NOTE



DATE:  04/06/2021



Mr. Christine is sort of down in the dumps today. He just got back from the

bathroom, and he is quite short of breath. His vital signs show a maximum

temperature of 98.1 with average heart rate that ranges between 82-88 in a

sinus rhythm, respiratory rate of 18-20 without the use of accessory muscles,

who is 93%-99% saturated on 2 liters nasal cannula and whose blood pressure is

ranging between 117/68 to 105/64.



His intake and output for the past 24 hours has been recorded as 1020 in and

nothing out with three voids not recorded. Weight today is 68.7 kg compared to

68.1 kg yesterday.



PHYSICAL EXAMINATION: He has equal breath sounds on either side with bilateral

crackles throughout both lungs, more at the bases, however. Percussion note is

full to the diaphragm. Cardiac exam is without murmurs, clicks, gallops, or

rubs.  I cannot feel his point of maximal impulse (PMI). S1 and S2 are normal.

Abdomen is soft and nontender. Bowel sounds are positive. There is no

hepatomegaly. No costovertebral angle (CVA) tenderness. Extremities show no

pretibial edema, no calf tenderness, no differential swelling of the upper

extremities. Skin is warm, dry, and perfused without cyanosis or mottling,

including that of the nailbeds and knees. Neck is supple. There is no jugular

venous distention. No subcutaneous emphysema. Trachea is midline. Mouth shows

the mucous membranes to be pink and moist. Lips and commissures without

lesions. No thrush. Eyes show his pupils to be equal and reactive. Extraocular

motion intact. Sclerae anicteric. Neurologic shows II-XII intact. Normal gross

motor, gross sensation intact. Gait is not tested. Psychiatric shows him to be

awake, alert, and oriented times three with appropriate mood and affect and

conversational.



His white count today is 12.4 with a hemoglobin and hematocrit of 14.3 and

46.7, respectively, with a platelet count of 175. There is no differential.



Electrolytes are normal except for an elevated total CO2 of 35, consistent with

his pulmonary fibrosis and gas exchanges abnormality. Calcium is 8.4 with a

glucose of 191.



His chest x-ray is unchanged from yesterday. There is still a small separation

of the lung from the superolateral chest wall. The lung is stuck to the cupula

and fully expanded. He has the same pulmonary fibrotic changes throughout both

lungs. 



IMPRESSION: 

1. Postoperative day #15 status post bullectomy, wedge resections, and talc

pleurodesis.

2. Spontaneous pneumothorax on the right, recurrent.

3. Alveolar pleural fistula, resolved.

4. Advanced interstitial lung disease, clinically idiopathic pulmonary fibrosis.

5. Underlying chronic obstructive pulmonary disease (COPD).

6. Hypoxic polycythemia.

7. Esophageal reflux disease.

8. Diabetes. 



PLAN AND DISCUSSION: I am becoming more confident that his lung is now going to

stay up and that it is adherent to the chest wall after the pleurodesis. The

small airspace is of no concern. I have removed his dressing, and his wounds

are sealed, although I will keep his U stitches in for another week. I can

remove them as an outpatient if he is discharged at some point in time.



His major problem is to where he can go. He gets short of breath just going

from the bathroom to his bed, and he certainly cannot go up his stairs at his

home where he has his bedroom. Patient and family services is looking for an

alternative placement. It would not be unreasonable to broach the conversation

of hospice care with him. I will defer that to the hospitalist service.

## 2021-04-06 NOTE — REP
INDICATION:

Recent right sided weakness.



COMPARISON:

None.



TECHNIQUE:

CT angiogram of the head



FINDINGS:

The vertebral arteries are codominant.  Basilar artery and PCAs are unremarkable.

Internal carotid arteries, proximal and distal MCA branches appear unremarkable.  A1

segments and RONALD branches appear unremarkable.



IMPRESSION:

Normal examination.  No evidence of large vessel stenosis or occlusion.  If clinical

suspicion for infarct persists, MRI recommended.





<Electronically signed by Franco Harper > 04/06/21 [id number]

## 2021-04-06 NOTE — REPVR
PROCEDURE INFORMATION: 

Exam: CT Head Without Contrast 

Exam date and time: 4/6/2021 7:54 PM 

Age: 65 years old 

Clinical indication: Other: Post tpa 



TECHNIQUE: 

Imaging protocol: Computed tomography of the head without contrast. 

Radiation optimization: All CT scans at this facility use at least one of these 

dose optimization techniques: automated exposure control; mA and/or kV 

adjustment per patient size (includes targeted exams where dose is matched to 

clinical indication); or iterative reconstruction. 



COMPARISON: 

CT Head without contrast 4/6/2021 12:50 PM 



FINDINGS: 

Brain:  Residual contrast is present.  No visible hemorrhage or edema.  The 

brain demonstrates mild generalized volume loss. 

Cerebral ventricles: The ventricles are mildly enlarged in keeping with volume 

loss. 

Bones/joints: Chronic apex right deviation and spurring of the nasal septum. No 

acute fracture seen. Incidentally noted is a right jugular diverticulum with 

small osseous dehiscence. Sclerotic lesion in the condylar head of the left 

mandible, potentially a bone island. 

Paranasal sinuses: Visualized sinuses are unremarkable. No fluid levels. 

Mastoid air cells: The mastoids are chronically underpneumatized. 

Soft tissues: Unremarkable. 



IMPRESSION: 

1. Images are mildly motion degraded. 

2. No acute intracranial abnormality seen. 



Electronically signed by: Kathy Fernandez On 04/06/2021  20:04:02 PM

## 2021-04-06 NOTE — REP
INDICATION:

s/p chest tube removal.



COMPARISON:

PA and lateral chest dated 04/05/2021.



TECHNIQUE:

Upright PA and lateral chest.



FINDINGS:

The right thoracotomy tube has been removed.  This is unchanged from the comparison

study.

There is a small right apical pneumothorax, unchanged in size.

Diffuse bilateral interstitial coarsening is again identified, unchanged.

Cardiac size is upper normal, unchanged.

The right subclavian central venous catheter is unchanged with the tip in the right

atrium.





IMPRESSION:

There is been no interval change.





<Electronically signed by Richard Junior > 04/06/21 [id number]

## 2021-04-07 VITALS — DIASTOLIC BLOOD PRESSURE: 53 MMHG | SYSTOLIC BLOOD PRESSURE: 82 MMHG

## 2021-04-07 VITALS — SYSTOLIC BLOOD PRESSURE: 138 MMHG | DIASTOLIC BLOOD PRESSURE: 82 MMHG

## 2021-04-07 VITALS — SYSTOLIC BLOOD PRESSURE: 110 MMHG | DIASTOLIC BLOOD PRESSURE: 73 MMHG

## 2021-04-07 VITALS — DIASTOLIC BLOOD PRESSURE: 52 MMHG | SYSTOLIC BLOOD PRESSURE: 84 MMHG

## 2021-04-07 VITALS — DIASTOLIC BLOOD PRESSURE: 55 MMHG | SYSTOLIC BLOOD PRESSURE: 85 MMHG

## 2021-04-07 VITALS — DIASTOLIC BLOOD PRESSURE: 72 MMHG | SYSTOLIC BLOOD PRESSURE: 107 MMHG

## 2021-04-07 VITALS — DIASTOLIC BLOOD PRESSURE: 50 MMHG | SYSTOLIC BLOOD PRESSURE: 81 MMHG

## 2021-04-07 VITALS — SYSTOLIC BLOOD PRESSURE: 76 MMHG | DIASTOLIC BLOOD PRESSURE: 53 MMHG

## 2021-04-07 VITALS — SYSTOLIC BLOOD PRESSURE: 103 MMHG | DIASTOLIC BLOOD PRESSURE: 66 MMHG

## 2021-04-07 VITALS — DIASTOLIC BLOOD PRESSURE: 85 MMHG | SYSTOLIC BLOOD PRESSURE: 152 MMHG

## 2021-04-07 VITALS — DIASTOLIC BLOOD PRESSURE: 76 MMHG | SYSTOLIC BLOOD PRESSURE: 118 MMHG

## 2021-04-07 VITALS — SYSTOLIC BLOOD PRESSURE: 123 MMHG | DIASTOLIC BLOOD PRESSURE: 91 MMHG

## 2021-04-07 VITALS — DIASTOLIC BLOOD PRESSURE: 52 MMHG | SYSTOLIC BLOOD PRESSURE: 77 MMHG

## 2021-04-07 VITALS — SYSTOLIC BLOOD PRESSURE: 86 MMHG | DIASTOLIC BLOOD PRESSURE: 53 MMHG

## 2021-04-07 VITALS — SYSTOLIC BLOOD PRESSURE: 147 MMHG | DIASTOLIC BLOOD PRESSURE: 89 MMHG

## 2021-04-07 VITALS — DIASTOLIC BLOOD PRESSURE: 47 MMHG | SYSTOLIC BLOOD PRESSURE: 70 MMHG

## 2021-04-07 VITALS — DIASTOLIC BLOOD PRESSURE: 92 MMHG | SYSTOLIC BLOOD PRESSURE: 152 MMHG

## 2021-04-07 VITALS — DIASTOLIC BLOOD PRESSURE: 60 MMHG | SYSTOLIC BLOOD PRESSURE: 99 MMHG

## 2021-04-07 VITALS — DIASTOLIC BLOOD PRESSURE: 64 MMHG | SYSTOLIC BLOOD PRESSURE: 105 MMHG

## 2021-04-07 VITALS — DIASTOLIC BLOOD PRESSURE: 50 MMHG | SYSTOLIC BLOOD PRESSURE: 74 MMHG

## 2021-04-07 VITALS — SYSTOLIC BLOOD PRESSURE: 85 MMHG | DIASTOLIC BLOOD PRESSURE: 51 MMHG

## 2021-04-07 VITALS — DIASTOLIC BLOOD PRESSURE: 50 MMHG | SYSTOLIC BLOOD PRESSURE: 79 MMHG

## 2021-04-07 VITALS — DIASTOLIC BLOOD PRESSURE: 52 MMHG | SYSTOLIC BLOOD PRESSURE: 83 MMHG

## 2021-04-07 VITALS — SYSTOLIC BLOOD PRESSURE: 139 MMHG | DIASTOLIC BLOOD PRESSURE: 88 MMHG

## 2021-04-07 VITALS — SYSTOLIC BLOOD PRESSURE: 132 MMHG | DIASTOLIC BLOOD PRESSURE: 86 MMHG

## 2021-04-07 VITALS — DIASTOLIC BLOOD PRESSURE: 51 MMHG | SYSTOLIC BLOOD PRESSURE: 71 MMHG

## 2021-04-07 VITALS — SYSTOLIC BLOOD PRESSURE: 84 MMHG | DIASTOLIC BLOOD PRESSURE: 54 MMHG

## 2021-04-07 VITALS — SYSTOLIC BLOOD PRESSURE: 87 MMHG | DIASTOLIC BLOOD PRESSURE: 56 MMHG

## 2021-04-07 VITALS — SYSTOLIC BLOOD PRESSURE: 117 MMHG | DIASTOLIC BLOOD PRESSURE: 75 MMHG

## 2021-04-07 VITALS — DIASTOLIC BLOOD PRESSURE: 80 MMHG | SYSTOLIC BLOOD PRESSURE: 112 MMHG

## 2021-04-07 VITALS — DIASTOLIC BLOOD PRESSURE: 80 MMHG | SYSTOLIC BLOOD PRESSURE: 136 MMHG

## 2021-04-07 VITALS — SYSTOLIC BLOOD PRESSURE: 123 MMHG | DIASTOLIC BLOOD PRESSURE: 78 MMHG

## 2021-04-07 VITALS — DIASTOLIC BLOOD PRESSURE: 65 MMHG | SYSTOLIC BLOOD PRESSURE: 92 MMHG

## 2021-04-07 VITALS — DIASTOLIC BLOOD PRESSURE: 51 MMHG | SYSTOLIC BLOOD PRESSURE: 80 MMHG

## 2021-04-07 VITALS — SYSTOLIC BLOOD PRESSURE: 100 MMHG | DIASTOLIC BLOOD PRESSURE: 59 MMHG

## 2021-04-07 VITALS — DIASTOLIC BLOOD PRESSURE: 85 MMHG | SYSTOLIC BLOOD PRESSURE: 169 MMHG

## 2021-04-07 VITALS — SYSTOLIC BLOOD PRESSURE: 109 MMHG | DIASTOLIC BLOOD PRESSURE: 73 MMHG

## 2021-04-07 VITALS — SYSTOLIC BLOOD PRESSURE: 122 MMHG | DIASTOLIC BLOOD PRESSURE: 81 MMHG

## 2021-04-07 VITALS — SYSTOLIC BLOOD PRESSURE: 127 MMHG | DIASTOLIC BLOOD PRESSURE: 95 MMHG

## 2021-04-07 VITALS — DIASTOLIC BLOOD PRESSURE: 48 MMHG | SYSTOLIC BLOOD PRESSURE: 71 MMHG

## 2021-04-07 VITALS — DIASTOLIC BLOOD PRESSURE: 72 MMHG | SYSTOLIC BLOOD PRESSURE: 157 MMHG

## 2021-04-07 VITALS — OXYGEN SATURATION: 97 %

## 2021-04-07 LAB
BASE EXCESS BLDA CALC-SCNC: 7.1 MMOL/L (ref -2–2)
BASOPHILS # BLD AUTO: 0.1 10^3/UL (ref 0–0.2)
BASOPHILS NFR BLD AUTO: 0.4 % (ref 0–1)
BUN SERPL-MCNC: 18 MG/DL (ref 7–18)
CALCIUM SERPL-MCNC: 8.8 MG/DL (ref 8.8–10.2)
CHLORIDE SERPL-SCNC: 100 MEQ/L (ref 98–107)
CHOLEST SERPL-MCNC: 212 MG/DL (ref ?–200)
CHOLEST/HDLC SERPL: 2 {RATIO} (ref ?–5)
CO2 BLDA CALC-SCNC: 33.1 MEQ/L (ref 23–31)
CO2 SERPL-SCNC: 27 MEQ/L (ref 21–32)
CREAT SERPL-MCNC: 0.57 MG/DL (ref 0.7–1.3)
EOSINOPHIL # BLD AUTO: 0.1 10^3/UL (ref 0–0.5)
EOSINOPHIL NFR BLD AUTO: 1 % (ref 0–3)
EST. AVERAGE GLUCOSE BLD GHB EST-MCNC: 177 MG/DL (ref 60–110)
GFR SERPL CREATININE-BSD FRML MDRD: > 60 ML/MIN/{1.73_M2} (ref 49–?)
GLUCOSE SERPL-MCNC: 147 MG/DL (ref 70–100)
HCO3 BLDA-SCNC: 31.7 MEQ/L (ref 22–26)
HCO3 STD BLDA-SCNC: 31 MEQ/L (ref 22–26)
HCT VFR BLD AUTO: 45.5 % (ref 42–52)
HCT VFR BLD AUTO: 53 % (ref 42–52)
HDLC SERPL-MCNC: 106 MG/DL (ref 40–?)
HGB BLD-MCNC: 14.7 G/DL (ref 13.5–17.5)
HGB BLD-MCNC: 16.4 G/DL (ref 13.5–17.5)
LDLC SERPL CALC-MCNC: 84 MG/DL (ref ?–100)
LYMPHOCYTES # BLD AUTO: 0.9 10^3/UL (ref 1.5–5)
LYMPHOCYTES NFR BLD AUTO: 6 % (ref 24–44)
MCH RBC QN AUTO: 29 PG (ref 27–33)
MCH RBC QN AUTO: 30 PG (ref 27–33)
MCHC RBC AUTO-ENTMCNC: 30.9 G/DL (ref 32–36.5)
MCHC RBC AUTO-ENTMCNC: 32.3 G/DL (ref 32–36.5)
MCV RBC AUTO: 92.9 FL (ref 80–96)
MCV RBC AUTO: 93.6 FL (ref 80–96)
MONOCYTES # BLD AUTO: 1.4 10^3/UL (ref 0–0.8)
MONOCYTES NFR BLD AUTO: 9.5 % (ref 2–8)
NEUTROPHILS # BLD AUTO: 11.5 10^3/UL (ref 1.5–8.5)
NEUTROPHILS NFR BLD AUTO: 80.8 % (ref 36–66)
NONHDLC SERPL-MCNC: 106 MG/DL
PCO2 BLDA: 44.5 MMHG (ref 35–45)
PH BLDA: 7.47 UNITS (ref 7.35–7.45)
PLATELET # BLD AUTO: 158 10^3/UL (ref 150–450)
PLATELET # BLD AUTO: 175 10^3/UL (ref 150–450)
PO2 BLDA: 105.1 MMHG (ref 75–100)
POTASSIUM SERPL-SCNC: 4.8 MEQ/L (ref 3.5–5.1)
RBC # BLD AUTO: 4.9 10^6/UL (ref 4.3–6.1)
RBC # BLD AUTO: 5.66 10^6/UL (ref 4.3–6.1)
SAO2 % BLDA: 98.4 % (ref 95–99)
SODIUM SERPL-SCNC: 134 MEQ/L (ref 136–145)
TRIGL SERPL-MCNC: 109 MG/DL (ref ?–150)
WBC # BLD AUTO: 14.3 10^3/UL (ref 4–10)
WBC # BLD AUTO: 17.8 10^3/UL (ref 4–10)

## 2021-04-07 PROCEDURE — 06HM3DZ INSERTION OF INTRALUMINAL DEVICE INTO RIGHT FEMORAL VEIN, PERCUTANEOUS APPROACH: ICD-10-PCS | Performed by: INTERNAL MEDICINE

## 2021-04-07 RX ADMIN — TIOTROPIUM BROMIDE SCH INHALATION: 18 CAPSULE ORAL; RESPIRATORY (INHALATION) at 07:18

## 2021-04-07 RX ADMIN — FLUTICASONE PROPIONATE AND SALMETEROL XINAFOATE SCH PUFF: 115; 21 AEROSOL, METERED RESPIRATORY (INHALATION) at 19:43

## 2021-04-07 RX ADMIN — INSULIN LISPRO SCH UNITS: 100 INJECTION, SOLUTION INTRAVENOUS; SUBCUTANEOUS at 07:30

## 2021-04-07 RX ADMIN — ATOVAQUONE SCH MG: 750 SUSPENSION ORAL at 08:44

## 2021-04-07 RX ADMIN — DEXTROSE MONOHYDRATE SCH MLS/HR: 50 INJECTION, SOLUTION INTRAVENOUS at 22:45

## 2021-04-07 RX ADMIN — INSULIN LISPRO SCH UNITS: 100 INJECTION, SOLUTION INTRAVENOUS; SUBCUTANEOUS at 21:00

## 2021-04-07 RX ADMIN — MIDODRINE HYDROCHLORIDE SCH MG: 5 TABLET ORAL at 08:00

## 2021-04-07 RX ADMIN — LEVALBUTEROL HYDROCHLORIDE SCH MG: 1.25 SOLUTION, CONCENTRATE RESPIRATORY (INHALATION) at 02:00

## 2021-04-07 RX ADMIN — INSULIN LISPRO SCH UNITS: 100 INJECTION, SOLUTION INTRAVENOUS; SUBCUTANEOUS at 12:37

## 2021-04-07 RX ADMIN — HYDROXYCHLOROQUINE SULFATE SCH MG: 200 TABLET, FILM COATED ENTERAL at 21:17

## 2021-04-07 RX ADMIN — LEVALBUTEROL HYDROCHLORIDE SCH MG: 1.25 SOLUTION, CONCENTRATE RESPIRATORY (INHALATION) at 07:17

## 2021-04-07 RX ADMIN — CALCIUM POLYCARBOPHIL SCH EA: 625 TABLET, FILM COATED ORAL at 08:41

## 2021-04-07 RX ADMIN — HYDROXYCHLOROQUINE SULFATE SCH MG: 200 TABLET, FILM COATED ENTERAL at 08:41

## 2021-04-07 RX ADMIN — FLUTICASONE PROPIONATE AND SALMETEROL XINAFOATE SCH PUFF: 115; 21 AEROSOL, METERED RESPIRATORY (INHALATION) at 07:17

## 2021-04-07 RX ADMIN — INSULIN DETEMIR SCH UNITS: 100 INJECTION, SOLUTION SUBCUTANEOUS at 08:42

## 2021-04-07 RX ADMIN — MIDODRINE HYDROCHLORIDE SCH MG: 5 TABLET ORAL at 16:00

## 2021-04-07 RX ADMIN — SODIUM CHLORIDE, PRESERVATIVE FREE SCH ML: 5 INJECTION INTRAVENOUS at 06:00

## 2021-04-07 RX ADMIN — MORPHINE SULFATE PRN MG: 2 INJECTION, SOLUTION INTRAMUSCULAR; INTRAVENOUS at 09:34

## 2021-04-07 RX ADMIN — DEXTROSE MONOHYDRATE PRN ML: 25 INJECTION, SOLUTION INTRAVENOUS at 14:48

## 2021-04-07 RX ADMIN — LEVALBUTEROL HYDROCHLORIDE SCH MG: 1.25 SOLUTION, CONCENTRATE RESPIRATORY (INHALATION) at 14:28

## 2021-04-07 RX ADMIN — SODIUM CHLORIDE, PRESERVATIVE FREE SCH ML: 5 INJECTION INTRAVENOUS at 22:00

## 2021-04-07 RX ADMIN — LEVALBUTEROL HYDROCHLORIDE SCH MG: 1.25 SOLUTION, CONCENTRATE RESPIRATORY (INHALATION) at 19:43

## 2021-04-07 RX ADMIN — SODIUM CHLORIDE, PRESERVATIVE FREE SCH ML: 5 INJECTION INTRAVENOUS at 12:38

## 2021-04-07 RX ADMIN — DEXTROSE MONOHYDRATE SCH MLS/HR: 5 INJECTION INTRAVENOUS at 18:22

## 2021-04-07 RX ADMIN — MIDODRINE HYDROCHLORIDE SCH MG: 5 TABLET ORAL at 12:38

## 2021-04-07 RX ADMIN — DEXTROSE MONOHYDRATE SCH MLS/HR: 50 INJECTION, SOLUTION INTRAVENOUS at 21:16

## 2021-04-07 RX ADMIN — INSULIN LISPRO SCH UNITS: 100 INJECTION, SOLUTION INTRAVENOUS; SUBCUTANEOUS at 16:21

## 2021-04-07 NOTE — REPVR
PROCEDURE INFORMATION: 

Exam: CT Head Without Contrast 

Exam date and time: 4/7/2021 6:00 AM 

Age: 65 years old 

Clinical indication: Other: S/P tpa; Additional info: Received tpa, repeat 

imaging 



TECHNIQUE: 

Imaging protocol: Computed tomography of the head without contrast. 

Radiation optimization: All CT scans at this facility use at least one of these 

dose optimization techniques: automated exposure control; mA and/or kV 

adjustment per patient size (includes targeted exams where dose is matched to 

clinical indication); or iterative reconstruction. 



COMPARISON: 

CT Head without contrast 4/6/2021 7:49 PM 



FINDINGS: 

Brain: There is a small focal area of hypoattenuation in the right parietal 

lobe on axial image 16. There is some subtle heterogeneous hypoattenuation in 

the right frontal lobe white matter on axial images 11 through 13. There is 

age-related cerebral atrophy. 

Cerebral ventricles: No ventriculomegaly. 

Bones/joints: Unremarkable. No acute fracture. 

Paranasal sinuses: Visualized sinuses are unremarkable. No fluid levels. 

Mastoid air cells: Visualized mastoid air cells are well aerated. 

Soft tissues: Unremarkable. 



IMPRESSION: 

1. No CT evidence of acute intracranial hemorrhage, mass effect or midline 

shift. 

2. No large acute territorial infarct seen. 

3. Faint subtle hypoattenuation in the right frontal and parietal white matter 

with no associated mass effect could represent areas of mild chronic 

microangiopathic changes however underlying acute white matter ischemia cannot 

be completely excluded. If clinically indicated MRI with diffusion-weighted 

images may be obtained for further evaluation. 



Electronically signed by: Jose R Betancourt On 04/07/2021  07:14:29 AM

## 2021-04-07 NOTE — REP
INDICATION:

PNEUMO/hemo thorax.



COMPARISON:

04/06/2021.



TECHNIQUE:

SINGLE PORTABLE AP VIEW OF THE CHEST WAS PERFORMED.



FINDINGS:

The previously noted very small right apical pneumothorax is not visualized on today's

exam.  The heart mediastinum are unchanged.  Diffuse coarsened interstitial markings

are stable.



IMPRESSION:

The previously noted right apical pneumothorax is not seen on today's exam.  Otherwise

stable.





<Electronically signed by Richard Santizo > 04/07/21 [id number]

## 2021-04-07 NOTE — REPVR
PROCEDURE INFORMATION: 



Exam: XR Chest 

Exam date and time:  4/07/21  (9:19pm)   

Age: 65 years old 

Clinical indication:  Possible tension pneumothorax



TECHNIQUE: 

Imaging protocol:  Portable CXR

Views:  1 view 



COMPARISON: 

Portable CXR of 4/07/21  (1:55pm) 



FINDINGS: 

Comparison is made with a portable CXR done approx. 7 hours ago.



Stable heart size (probably mildly enlarged).

Diffuse coarse interstitial disease again seen, bilaterally.

No significant pleural effusions.

No significant pneumothorax is evident.





IMPRESSION: 

No significant interval change noted over the past 7 hours.

No significant pneumothorax is appreciated.

Diffuse coarse bilateral interstitial disease persists.



Electronically signed by: Katherine Lora On 04/07/2021  22:13:55 PM

## 2021-04-07 NOTE — REP
INDICATION:

multiple emboli to brain



COMPARISON:

None.



TECHNIQUE:

Real time compression and duplex Doppler interrogation of the bilateral lower

extremity deep venous system is performed.



FINDINGS:

Bilaterally, the common femoral, superficial femoral and popliteal veins are fully

compressible with transducer pressure and demonstrate normal spontaneous and phasic

flow, without evidence of deep venous thrombosis.



IMPRESSION:

No evidence of deep venous thrombosis of the bilateral lower extremity femoral

popliteal venous system.





<Electronically signed by Richard Santizo > 04/07/21 [id number]

## 2021-04-07 NOTE — CR
CONSULTATION

DATE: 04/06/2021



REFERRING PROVIDER:  Dr. Mittal.



HISTORY OF PRESENT ILLNESS:  Melissa Christine is a 65-year-old male with a past

medical history significant for interstitial fibrosis presenting with

spontaneous pneumothorax of the right lung status post chest tube placement,

status post removal of chest tube two days ago. The patient was suspected to

have an acute stroke earlier today, he was reported to have symptoms around

noontime of sudden right upper extremity weakness, right lower extremity

weakness, right facial droop, slurred speech and inability to use the left leg

as well. The patient's head CT and lab work were negative. The patient was a

TPA candidate. The option of TPA was discussed with the patient by Dr. Mittal

including complications of hemothorax given recent removal of chest tube. We

attempted to transfer the patient to [facility] for higher level of care and

further management including possibility of intraarterial TPA given that there

was a reasonable window of opportunity. The patient agreed for TPA knowing

risks of hemorrhage. His INH Stroke Scale was reported by Dr. Mittal to be 15.

Patient's TPA was given around 5 p.m. 



Around 12:20 p.m. he had became hypoxic and required 5 liters of oxygen. Around

12:30 p.m., the patient was unable to move his right arm and right leg as well

as his left leg. The patient did receive the TPA within a 4 1/2 hour window

since the onset of the stroke. The patient was examined later that evening and

was noted to have an inability to move the right arm at all. He was unable to

move his feet or legs. He had positive Babinski signs bilaterally. He had

slight right facial droop, slight slurred speech. He was sleepy, lethargic, he

had received a Percocet. The patient was cleared by Dr. Hay to receive TPA

if needed given lower risk of hemothorax. Head CT completed at 7:38 on

04/06/2021 did not show any acute intracranial process. CT completed 6 a.m. on

04/07/2021 showed possible right frontoparietal hypo-attenuation suggesting

possible stroke. The patient was unable to provide answers properly to the MRI

screener and has not had an MRI yet. There was no large vessel occlusion on CT

angiography. 



The patient received TPA at the time of 3:30 p.m. which would be exactly three

hours after the onset of the neurological symptoms. 



The patient is able to repeat, he is able to use his left arm voluntarily, he

can touch his nose, he can smile with reasonably symmetry, tongue appears

midline. He is lethargic and does not cooperate with the rest of the

neurological exam. Deep tendon reflexes are absent at the patella's, 2+ at the

triceps, 1+ at the biceps. Patient does not make much movement on noxious

stimuli to the hands and feet. 



PAST MEDICAL HISTORY: 

  1.  Pulmonary fibrosis. 

  2.  Diabetes. 



FAMILY HISTORY:  Positive for diabetes.



ALLERGIES: No known drug allergies.



SOCIAL HISTORY: He is a nonsmoker. Denies use of any alcohol. 



PHYSICAL EXAMINATION: Blood pressure was 109/66, pulse rate was 94, respiratory

rate is 33. Oxygenation is 99% on 4 liters. Temperature is 98.1 degrees

Fahrenheit. Patient is able to state his name. He has dysarthria. He does not

have his dentures in. The patient has 2.5 mm pupils, minimally reactive.

Extraocular movements appear to be intact. Facial symmetry appears to be

preserved on activation. Slight asymmetry at rest on the right lower face.

Patient is unable to move the right arm at all, has strength of 0 and inability

to move bilateral lower extremities, strength of 0. Left arm: He is able to

lift it up against gravity, touch his face and perform finger to nose. Romberg

testing and gait had to be deferred. Patient is laying down unable to move. 



ASSESSMENT:

  1.  A 65-year-old male with interstitial lung disease status post spontaneous

      pneumothorax status post chest tube and removal two days. He became

      hypoxic earlier today and suddenly stopped moving his right arm and both

      legs with right facial droop and slurred speech. INR stroke scale

      reported to be 15. Patient was deemed a TPA candidate. Patient gave verbal

      consent to receiving TPA after risks and benefits were provided to the

      patient. The patient received TPA at the 3 hour melissa into the stroke. We

      are awaiting MRI to be completed to identify areas of possible stroke,

      possible multifocal stroke given bilateral leg involvement versus

      brainstem. Consider cervical spine imaging if the MRI does not show a

      stroke. Continue supportive care. Precautions post TPA should be taken as

      ordered. Continue PT/OT assessments. Continue underlying medical 
management for

      pneumothorax. Obtain echocardiogram. 

[facility]

## 2021-04-07 NOTE — IPNPDOC
Subjective


Date Seen


The patient was seen on 4/7/21.





Subjective


Chief Complaint/HPI


Mr. Christine is a 65 year old male with pulmonary interstitial fibrosis here with 

spontaneous pneumothorax of the right lung and now has developed CVA. Last 

night, his mentation worsened and he became agitated and confused. He did not do

well with swallowing pills and was made NPO. This morning, he was in pain 

complaining of right shoulder and neck pain. ST evaluated and recommended pureed

diet. He was able to do okay with the pureed diet and Percocet for the pain. MRI

was performed. Neurology, Dr. Kaba looked at the images and reports that 

patient had bilateral multiple ischemic infarcts. Concern for cardiac embolic. 

Patient had echocardiogram done prior to MRI. 





Patient required Ativan to go down for MRI. On return, BP was low at 87/56. 

Unclear cause for hypotension, possibly septic. Started 30mL/kg of IVF bolus and

IV vancomycin and Zosyn. Blood pressure was not improving with IVF bolus and 

reached out to patient's sister. Sister deferred to Kenny (Nephew) to make 

decisions for patient. Kenny wanted to see patient. After long family discussion 

about CMO vs treatment, Kenny wanted to continue treatment. Reached out to Dr. Greer who placed central line. Reached out to cardiology, Dr. Avila. Although 

patient had a small troponin of 1, there was no STEMI on EKG. ACS is unlikely 

cause of shock. Dr. Avila read echocardiogram which demonstrated severe 

pulmonary hypertension and gave a poor prognosis. Relayed information to Kenny who

wanted to wait and see what the blood cultures show. 





Reached out to neurology about family's decision to continue treatment. Patient 

will need to maintain BP between 140 to 180 and start full dose aspirin today.





Objective


Physical Examination


General Exam:  Positive: Cooperative, Other (Fatigued)


Eye Exam:  Positive: EOMI; 


   Negative: Sclera icteric


ENT Exam:  Positive: Atraumatic


Chest Exam:  Positive: Clear to auscultation


Heart Exam:  Positive: Rate Normal, Regular Rhythm


Abdomen Exam:  Positive: Normal bowel sounds, Soft; 


   Negative: Tenderness


Extremity Exam:  Negative: Edema


Neuro Exam:  Positive: Normal Speech; 


   Negative: Strength at 5/5 X4 ext (Cannot lift right arm, right leg, left leg,

against gravitiy. )


Psych Exam:  Positive: Mental status NL





Assessment /Plan


Assessment


Mr. Christine is a 65 year old male with pulmonary interstitial fibrosis here with 

spontaneous pneumothorax of the right lung. Patient had talc pleurodesis on 

3/22/2021 and removed chest tubes on 4/4/2021. On 4/6/2021, patient demonstrated

stroke like symptoms with right facial droop, right arm and leg weakness, and 

left leg weakness. Neurology consulted. Discussed with patient about risks and 

benefits of TPA and patient is agreeable. Gave TPA on 7/6/2021





Patient did not improve after TPA. We were able to obtain MRI afterwards. 

Demonstrated bilateral multiple infarcts which is concerning for cardioembolic 

source. Echocardiogram did not demonstrate vegetation, but will have to wait for

blood cultures before pursuing TOBI. Otherwise, on Levophed, IV fluids, and broad

spectrum IV antibiotics. Also on Hydrocortisone due to history of long steroid 

use.





I explained the situation to Kenny, who will be making decisions for the patient. 

He wants to wait for blood cultures to make a decision on CMO or continue 

treatment. Until then, we will continue treatment.





Plan/VTE


VTE Prophylaxis Ordered?:  Yes





Plan


1. Spontaneous pneumothorax on the right with recurrence, s/p blood patch on 

3/17 and 3/19 and talc pleurodesis on 3/22


-Dr. Hay following recommendations appreciated


-Chest tubes removed on 4/4/2021





2. CVA


-On 4/6/2021, demonstrated right face, arm, and leg weakness along with left leg

weakness


-Neurology consulted, recommendations appreciated


-Risks and benefits discussed with patient, patient was agreeable to TPA


-TPA protocol ordered


-Did not improve with TPA


-MRI demonstrated bilateral multiple infarcts, concerning for cardioembolic 

source


-TTE negative. Will wait for blood cultures before pursuing TOBI


-Will need blood pressure between 140 to 180 in the period of acute stroke


-Will start full dose aspirin





3. Shock


-Unclear etiology, may be secondary to infections vs adrenal insufficiency


-Did not respond to 30ml/kg of fluid


-Started stress dose steroids and Levophed


-Continue vancomycin and Zosyn





4. Severe pulmonary hypertension


-Reported by Dr. Avila after reading echocardiogram


-Per Dr. Avila, poor prognosis. Reported this prognosis to Kenny (nephew). 





5. Diabetes mellitus


-Sliding scale insulin 





6. Lupus


-Patient was scheduled for Cytoxan on 3/11/2021 per patient's Rheumatologist 

office in [place], [place]


-Dr. Benitez spoke with patient, patient request to defer Cytoxan until he has 

recovered


-Continue with hydrocortisone and Plaquenil


Of note, due to patient's chronic and high-dose steroids, he is not a candidate

for the COVID vaccination at this time





7. GERD


-Continue pantoprazole





8. DVT ppx


-No heparin due to TPA





VS, I&O, 24H, Fishbone


Vital Signs/I&O





Vital Signs








  Date Time  Temp Pulse Resp B/P (MAP) Pulse Ox O2 Delivery O2 Flow Rate FiO2


 


4/7/21 21:46  77  85/51    


 


4/7/21 19:44     97 Nasal Cannula 3.0 


 


4/7/21 12:38   28     


 


4/7/21 12:00 98.5       


 


4/6/21 16:52        














I&O- Last 24 Hours up to 6 AM 


 


 4/7/21





 06:00


 


Intake Total 830 ml


 


Output Total 300 ml


 


Balance 530 ml











Laboratory Data


24H LABS


Laboratory Tests 2


4/6/21 23:21: Bedside Glucose (Misc Panel) 97


4/7/21 01:44: Bedside Glucose (Misc Panel) 96


4/7/21 03:06: Bedside Glucose (Misc Panel) 128H


4/7/21 04:17: 


Nucleated Red Blood Cells % (auto) 0.0, Anion Gap 7L, Glomerular Filtration Rate

> 60.0, Estimated Mean Plasma Glucose 177H, Hemoglobin A1c 7.8, Calcium Level 

8.8, Triglycerides Level 109, Total Cholesterol 212H, LDL Cholesterol 84, 

Non-HDL Cholesterol (LDL + VLDL) 106, Total HDL Cholesterol 106, Cholesterol/HDL

Ratio 2.000


4/7/21 07:51: Bedside Glucose (Misc Panel) 160H


4/7/21 11:22: Bedside Glucose (Misc Panel) 212H


4/7/21 14:46: Bedside Glucose (Misc Panel) 60L


4/7/21 16:17: Bedside Glucose (Misc Panel) 124H


4/7/21 17:15: Bedside Glucose (Misc Panel) 88


4/7/21 17:25: 


Blood Gas Bicarbonate Standard 31.0H, Arterial Blood pH 7.471H, Arterial Blood 

Partial Pressure CO2 44.5, Arterial Blood Partial Pressure O2 105.1H, Arterial 

Blood Total CO2 33.1H, Arterial Blood HCO3 31.7H, Arterial Blood Base Excess 7

.1H, Arterial Blood Oxygen Saturation 98.4


4/7/21 17:45: Methicillin-Resist S.aureus DNA PCR NOT DETECTED


4/7/21 17:58: Bedside Glucose (Misc Panel) 101


4/7/21 17:59: 


Immature Granulocyte % (Auto) 2.3, Neutrophils (%) (Auto) 80.8H, Lymphocytes (%)

(Auto) 6.0L, Monocytes (%) (Auto) 9.5H, Eosinophils (%) (Auto) 1.0, Basophils 

(%) (Auto) 0.4, Neutrophils # (Auto) 11.5H, Lymphocytes # (Auto) 0.9L, Monocytes

# (Auto) 1.4H, Eosinophils # (Auto) 0.1, Basophils # (Auto) 0.1, Nucleated Red 

Blood Cells % (auto) 0.0, Lactic Acid Level 1.7, Troponin I 1.01H


4/7/21 18:50: 


Urine Color YELLOW, Urine Appearance CLEAR, Urine pH 5.0, Urine Specific Gravity

1.029, Urine Protein 1+H, Urine Glucose (UA) 1+H, Urine Ketones TRACEH, Urine 

Blood 2+H, Urine Nitrite NEGATIVE, Urine Bilirubin NEGATIVE, Urine Urobilinogen 

2.0H, Urine Leukocyte Esterase NEGATIVE, Urine WBC (Auto) 1, Urine RBC (Auto) 

102H, Urine Hyaline Casts (Auto) 4, Urine Bacteria (Auto) NEGATIVE, Urine 

Squamous Epithelial Cells 0, Urine Mucus (Auto) SMALL, Urine Sperm (Auto) 


4/7/21 21:31: Bedside Glucose (Misc Panel) 91


CBC/BMP


Laboratory Tests


4/7/21 04:17








4/7/21 17:59








Microbiology





Microbiology


4/7/21 Blood Culture, Received


         Pending


4/7/21 Blood Culture, Received


         Pending











TEO JACKSON DO                Apr 7, 2021 22:55

## 2021-04-07 NOTE — REP
INDICATION:

Right arm weakness, bilateral leg weakness.



COMPARISON:

None.



TECHNIQUE:

Axial T1, T2, FLAIR, and diffusion-weighted images obtained.  Sagittal T1 weighted

images obtained.  Gradient images also obtained.



FINDINGS:

Foci of restricted diffusion is seen in the bilateral hemispheres, occipital lobes

left greater than right, and toward the convexities, bilateral motor cortices of the

posterior frontal lobes.  There is also restricted diffusion through the watershed

areas of the bilateral frontal lobes.



Gradient sequences are motion degraded.



FLAIR sequences are highly motion degraded but no definite gross FLAIR signal

abnormality.



IMPRESSION:

Limited study with the most series severely motion degraded.  The diffusion-weighted

imaging evidences scattered areas of restricted diffusion suggestive of a systemic

rather than a vascular infarct.  The appearance could represent the sequelae of

hypoxic ischemic encephalopathy, such as from a hypotensive or anoxic episode.  No

evidence of hemorrhage.



The findings were communicated to Emily Easley RN in the ICU at the time of

interpretation.





<Electronically signed by Franco Harper > 04/07/21 0300

## 2021-04-08 VITALS — DIASTOLIC BLOOD PRESSURE: 73 MMHG | SYSTOLIC BLOOD PRESSURE: 155 MMHG

## 2021-04-08 VITALS — DIASTOLIC BLOOD PRESSURE: 67 MMHG | SYSTOLIC BLOOD PRESSURE: 136 MMHG

## 2021-04-08 VITALS — DIASTOLIC BLOOD PRESSURE: 82 MMHG | SYSTOLIC BLOOD PRESSURE: 155 MMHG

## 2021-04-08 VITALS — DIASTOLIC BLOOD PRESSURE: 92 MMHG | SYSTOLIC BLOOD PRESSURE: 155 MMHG

## 2021-04-08 VITALS — SYSTOLIC BLOOD PRESSURE: 140 MMHG | DIASTOLIC BLOOD PRESSURE: 80 MMHG

## 2021-04-08 VITALS — SYSTOLIC BLOOD PRESSURE: 154 MMHG | DIASTOLIC BLOOD PRESSURE: 92 MMHG

## 2021-04-08 VITALS — DIASTOLIC BLOOD PRESSURE: 79 MMHG | SYSTOLIC BLOOD PRESSURE: 159 MMHG

## 2021-04-08 VITALS — SYSTOLIC BLOOD PRESSURE: 147 MMHG | DIASTOLIC BLOOD PRESSURE: 70 MMHG

## 2021-04-08 VITALS — DIASTOLIC BLOOD PRESSURE: 68 MMHG | SYSTOLIC BLOOD PRESSURE: 151 MMHG

## 2021-04-08 VITALS — SYSTOLIC BLOOD PRESSURE: 140 MMHG | DIASTOLIC BLOOD PRESSURE: 78 MMHG

## 2021-04-08 VITALS — SYSTOLIC BLOOD PRESSURE: 138 MMHG | DIASTOLIC BLOOD PRESSURE: 89 MMHG

## 2021-04-08 VITALS — DIASTOLIC BLOOD PRESSURE: 64 MMHG | SYSTOLIC BLOOD PRESSURE: 135 MMHG

## 2021-04-08 VITALS — DIASTOLIC BLOOD PRESSURE: 67 MMHG | SYSTOLIC BLOOD PRESSURE: 150 MMHG

## 2021-04-08 VITALS — SYSTOLIC BLOOD PRESSURE: 156 MMHG | DIASTOLIC BLOOD PRESSURE: 86 MMHG

## 2021-04-08 VITALS — DIASTOLIC BLOOD PRESSURE: 70 MMHG | SYSTOLIC BLOOD PRESSURE: 150 MMHG

## 2021-04-08 VITALS — SYSTOLIC BLOOD PRESSURE: 137 MMHG | DIASTOLIC BLOOD PRESSURE: 65 MMHG

## 2021-04-08 VITALS — SYSTOLIC BLOOD PRESSURE: 141 MMHG | DIASTOLIC BLOOD PRESSURE: 76 MMHG

## 2021-04-08 VITALS — DIASTOLIC BLOOD PRESSURE: 79 MMHG | SYSTOLIC BLOOD PRESSURE: 175 MMHG

## 2021-04-08 VITALS — DIASTOLIC BLOOD PRESSURE: 77 MMHG | SYSTOLIC BLOOD PRESSURE: 149 MMHG

## 2021-04-08 VITALS — DIASTOLIC BLOOD PRESSURE: 79 MMHG | SYSTOLIC BLOOD PRESSURE: 147 MMHG

## 2021-04-08 VITALS — DIASTOLIC BLOOD PRESSURE: 68 MMHG | SYSTOLIC BLOOD PRESSURE: 145 MMHG

## 2021-04-08 VITALS — SYSTOLIC BLOOD PRESSURE: 152 MMHG | DIASTOLIC BLOOD PRESSURE: 82 MMHG

## 2021-04-08 VITALS — SYSTOLIC BLOOD PRESSURE: 146 MMHG | DIASTOLIC BLOOD PRESSURE: 78 MMHG

## 2021-04-08 VITALS — SYSTOLIC BLOOD PRESSURE: 124 MMHG | DIASTOLIC BLOOD PRESSURE: 60 MMHG

## 2021-04-08 VITALS — DIASTOLIC BLOOD PRESSURE: 79 MMHG | SYSTOLIC BLOOD PRESSURE: 135 MMHG

## 2021-04-08 VITALS — DIASTOLIC BLOOD PRESSURE: 63 MMHG | SYSTOLIC BLOOD PRESSURE: 143 MMHG

## 2021-04-08 VITALS — SYSTOLIC BLOOD PRESSURE: 132 MMHG | DIASTOLIC BLOOD PRESSURE: 69 MMHG

## 2021-04-08 VITALS — DIASTOLIC BLOOD PRESSURE: 81 MMHG | SYSTOLIC BLOOD PRESSURE: 172 MMHG

## 2021-04-08 VITALS — DIASTOLIC BLOOD PRESSURE: 70 MMHG | SYSTOLIC BLOOD PRESSURE: 161 MMHG

## 2021-04-08 VITALS — DIASTOLIC BLOOD PRESSURE: 72 MMHG | SYSTOLIC BLOOD PRESSURE: 151 MMHG

## 2021-04-08 VITALS — SYSTOLIC BLOOD PRESSURE: 149 MMHG | DIASTOLIC BLOOD PRESSURE: 72 MMHG

## 2021-04-08 VITALS — SYSTOLIC BLOOD PRESSURE: 146 MMHG | DIASTOLIC BLOOD PRESSURE: 87 MMHG

## 2021-04-08 VITALS — SYSTOLIC BLOOD PRESSURE: 139 MMHG | DIASTOLIC BLOOD PRESSURE: 75 MMHG

## 2021-04-08 VITALS — SYSTOLIC BLOOD PRESSURE: 145 MMHG | DIASTOLIC BLOOD PRESSURE: 82 MMHG

## 2021-04-08 VITALS — SYSTOLIC BLOOD PRESSURE: 156 MMHG | DIASTOLIC BLOOD PRESSURE: 88 MMHG

## 2021-04-08 VITALS — SYSTOLIC BLOOD PRESSURE: 155 MMHG | DIASTOLIC BLOOD PRESSURE: 80 MMHG

## 2021-04-08 VITALS — SYSTOLIC BLOOD PRESSURE: 146 MMHG | DIASTOLIC BLOOD PRESSURE: 85 MMHG

## 2021-04-08 VITALS — SYSTOLIC BLOOD PRESSURE: 171 MMHG | DIASTOLIC BLOOD PRESSURE: 94 MMHG

## 2021-04-08 VITALS — DIASTOLIC BLOOD PRESSURE: 89 MMHG | SYSTOLIC BLOOD PRESSURE: 154 MMHG

## 2021-04-08 VITALS — OXYGEN SATURATION: 97 %

## 2021-04-08 VITALS — DIASTOLIC BLOOD PRESSURE: 70 MMHG | SYSTOLIC BLOOD PRESSURE: 153 MMHG

## 2021-04-08 VITALS — DIASTOLIC BLOOD PRESSURE: 78 MMHG | SYSTOLIC BLOOD PRESSURE: 140 MMHG

## 2021-04-08 VITALS — SYSTOLIC BLOOD PRESSURE: 139 MMHG | DIASTOLIC BLOOD PRESSURE: 77 MMHG

## 2021-04-08 VITALS — DIASTOLIC BLOOD PRESSURE: 65 MMHG | SYSTOLIC BLOOD PRESSURE: 148 MMHG

## 2021-04-08 VITALS — SYSTOLIC BLOOD PRESSURE: 140 MMHG | DIASTOLIC BLOOD PRESSURE: 73 MMHG

## 2021-04-08 VITALS — SYSTOLIC BLOOD PRESSURE: 152 MMHG | DIASTOLIC BLOOD PRESSURE: 83 MMHG

## 2021-04-08 VITALS — SYSTOLIC BLOOD PRESSURE: 153 MMHG | DIASTOLIC BLOOD PRESSURE: 77 MMHG

## 2021-04-08 VITALS — DIASTOLIC BLOOD PRESSURE: 76 MMHG | SYSTOLIC BLOOD PRESSURE: 140 MMHG

## 2021-04-08 VITALS — DIASTOLIC BLOOD PRESSURE: 80 MMHG | SYSTOLIC BLOOD PRESSURE: 143 MMHG

## 2021-04-08 VITALS — SYSTOLIC BLOOD PRESSURE: 153 MMHG | DIASTOLIC BLOOD PRESSURE: 80 MMHG

## 2021-04-08 VITALS — DIASTOLIC BLOOD PRESSURE: 75 MMHG | SYSTOLIC BLOOD PRESSURE: 141 MMHG

## 2021-04-08 VITALS — DIASTOLIC BLOOD PRESSURE: 86 MMHG | SYSTOLIC BLOOD PRESSURE: 154 MMHG

## 2021-04-08 VITALS — SYSTOLIC BLOOD PRESSURE: 145 MMHG | DIASTOLIC BLOOD PRESSURE: 73 MMHG

## 2021-04-08 VITALS — SYSTOLIC BLOOD PRESSURE: 153 MMHG | DIASTOLIC BLOOD PRESSURE: 70 MMHG

## 2021-04-08 VITALS — SYSTOLIC BLOOD PRESSURE: 130 MMHG | DIASTOLIC BLOOD PRESSURE: 82 MMHG

## 2021-04-08 VITALS — DIASTOLIC BLOOD PRESSURE: 68 MMHG | SYSTOLIC BLOOD PRESSURE: 148 MMHG

## 2021-04-08 VITALS — SYSTOLIC BLOOD PRESSURE: 136 MMHG | DIASTOLIC BLOOD PRESSURE: 78 MMHG

## 2021-04-08 VITALS — DIASTOLIC BLOOD PRESSURE: 72 MMHG | SYSTOLIC BLOOD PRESSURE: 140 MMHG

## 2021-04-08 VITALS — SYSTOLIC BLOOD PRESSURE: 158 MMHG | DIASTOLIC BLOOD PRESSURE: 88 MMHG

## 2021-04-08 VITALS — DIASTOLIC BLOOD PRESSURE: 67 MMHG | SYSTOLIC BLOOD PRESSURE: 147 MMHG

## 2021-04-08 VITALS — SYSTOLIC BLOOD PRESSURE: 153 MMHG | DIASTOLIC BLOOD PRESSURE: 78 MMHG

## 2021-04-08 VITALS — DIASTOLIC BLOOD PRESSURE: 90 MMHG | SYSTOLIC BLOOD PRESSURE: 157 MMHG

## 2021-04-08 VITALS — SYSTOLIC BLOOD PRESSURE: 155 MMHG | DIASTOLIC BLOOD PRESSURE: 71 MMHG

## 2021-04-08 VITALS — DIASTOLIC BLOOD PRESSURE: 73 MMHG | SYSTOLIC BLOOD PRESSURE: 154 MMHG

## 2021-04-08 VITALS — DIASTOLIC BLOOD PRESSURE: 77 MMHG | SYSTOLIC BLOOD PRESSURE: 132 MMHG

## 2021-04-08 VITALS — SYSTOLIC BLOOD PRESSURE: 137 MMHG | DIASTOLIC BLOOD PRESSURE: 73 MMHG

## 2021-04-08 VITALS — SYSTOLIC BLOOD PRESSURE: 144 MMHG | DIASTOLIC BLOOD PRESSURE: 72 MMHG

## 2021-04-08 VITALS — DIASTOLIC BLOOD PRESSURE: 79 MMHG | SYSTOLIC BLOOD PRESSURE: 153 MMHG

## 2021-04-08 VITALS — DIASTOLIC BLOOD PRESSURE: 87 MMHG | SYSTOLIC BLOOD PRESSURE: 147 MMHG

## 2021-04-08 VITALS — SYSTOLIC BLOOD PRESSURE: 133 MMHG | DIASTOLIC BLOOD PRESSURE: 61 MMHG

## 2021-04-08 VITALS — DIASTOLIC BLOOD PRESSURE: 67 MMHG | SYSTOLIC BLOOD PRESSURE: 137 MMHG

## 2021-04-08 VITALS — DIASTOLIC BLOOD PRESSURE: 83 MMHG | SYSTOLIC BLOOD PRESSURE: 161 MMHG

## 2021-04-08 VITALS — SYSTOLIC BLOOD PRESSURE: 138 MMHG | DIASTOLIC BLOOD PRESSURE: 74 MMHG

## 2021-04-08 VITALS — SYSTOLIC BLOOD PRESSURE: 136 MMHG | DIASTOLIC BLOOD PRESSURE: 69 MMHG

## 2021-04-08 VITALS — SYSTOLIC BLOOD PRESSURE: 131 MMHG | DIASTOLIC BLOOD PRESSURE: 78 MMHG

## 2021-04-08 VITALS — DIASTOLIC BLOOD PRESSURE: 74 MMHG | SYSTOLIC BLOOD PRESSURE: 131 MMHG

## 2021-04-08 VITALS — SYSTOLIC BLOOD PRESSURE: 159 MMHG | DIASTOLIC BLOOD PRESSURE: 67 MMHG

## 2021-04-08 VITALS — DIASTOLIC BLOOD PRESSURE: 64 MMHG | SYSTOLIC BLOOD PRESSURE: 147 MMHG

## 2021-04-08 VITALS — SYSTOLIC BLOOD PRESSURE: 155 MMHG | DIASTOLIC BLOOD PRESSURE: 88 MMHG

## 2021-04-08 VITALS — SYSTOLIC BLOOD PRESSURE: 148 MMHG | DIASTOLIC BLOOD PRESSURE: 80 MMHG

## 2021-04-08 VITALS — SYSTOLIC BLOOD PRESSURE: 152 MMHG | DIASTOLIC BLOOD PRESSURE: 85 MMHG

## 2021-04-08 VITALS — DIASTOLIC BLOOD PRESSURE: 83 MMHG | SYSTOLIC BLOOD PRESSURE: 151 MMHG

## 2021-04-08 VITALS — DIASTOLIC BLOOD PRESSURE: 88 MMHG | SYSTOLIC BLOOD PRESSURE: 155 MMHG

## 2021-04-08 VITALS — DIASTOLIC BLOOD PRESSURE: 88 MMHG | SYSTOLIC BLOOD PRESSURE: 162 MMHG

## 2021-04-08 VITALS — DIASTOLIC BLOOD PRESSURE: 61 MMHG | SYSTOLIC BLOOD PRESSURE: 133 MMHG

## 2021-04-08 VITALS — DIASTOLIC BLOOD PRESSURE: 80 MMHG | SYSTOLIC BLOOD PRESSURE: 160 MMHG

## 2021-04-08 VITALS — DIASTOLIC BLOOD PRESSURE: 83 MMHG | SYSTOLIC BLOOD PRESSURE: 158 MMHG

## 2021-04-08 VITALS — SYSTOLIC BLOOD PRESSURE: 150 MMHG | DIASTOLIC BLOOD PRESSURE: 83 MMHG

## 2021-04-08 VITALS — SYSTOLIC BLOOD PRESSURE: 146 MMHG | DIASTOLIC BLOOD PRESSURE: 70 MMHG

## 2021-04-08 LAB
BUN SERPL-MCNC: 16 MG/DL (ref 7–18)
BUN SERPL-MCNC: 24 MG/DL (ref 7–18)
CALCIUM SERPL-MCNC: 8.1 MG/DL (ref 8.8–10.2)
CALCIUM SERPL-MCNC: 8.2 MG/DL (ref 8.8–10.2)
CHLORIDE SERPL-SCNC: 101 MEQ/L (ref 98–107)
CHLORIDE SERPL-SCNC: 104 MEQ/L (ref 98–107)
CO2 SERPL-SCNC: 28 MEQ/L (ref 21–32)
CO2 SERPL-SCNC: 36 MEQ/L (ref 21–32)
CREAT SERPL-MCNC: 0.66 MG/DL (ref 0.7–1.3)
CREAT SERPL-MCNC: 0.69 MG/DL (ref 0.7–1.3)
GFR SERPL CREATININE-BSD FRML MDRD: > 60 ML/MIN/{1.73_M2} (ref 49–?)
GFR SERPL CREATININE-BSD FRML MDRD: > 60 ML/MIN/{1.73_M2} (ref 49–?)
GLUCOSE SERPL-MCNC: 250 MG/DL (ref 70–100)
GLUCOSE SERPL-MCNC: 293 MG/DL (ref 70–100)
HCT VFR BLD AUTO: 49.2 % (ref 42–52)
HGB BLD-MCNC: 14.9 G/DL (ref 13.5–17.5)
MCH RBC QN AUTO: 29.3 PG (ref 27–33)
MCHC RBC AUTO-ENTMCNC: 30.3 G/DL (ref 32–36.5)
MCV RBC AUTO: 96.7 FL (ref 80–96)
PLATELET # BLD AUTO: 215 10^3/UL (ref 150–450)
POTASSIUM SERPL-SCNC: 4.3 MEQ/L (ref 3.5–5.1)
POTASSIUM SERPL-SCNC: 5 MEQ/L (ref 3.5–5.1)
RBC # BLD AUTO: 5.09 10^6/UL (ref 4.3–6.1)
SODIUM SERPL-SCNC: 139 MEQ/L (ref 136–145)
SODIUM SERPL-SCNC: 140 MEQ/L (ref 136–145)
VANCOMYCIN TROUGH SERPL-MCNC: 13.7 UG/ML (ref 10–20)
WBC # BLD AUTO: 21.4 10^3/UL (ref 4–10)

## 2021-04-08 RX ADMIN — SODIUM CHLORIDE, PRESERVATIVE FREE SCH ML: 5 INJECTION INTRAVENOUS at 21:32

## 2021-04-08 RX ADMIN — HYDROXYCHLOROQUINE SULFATE SCH MG: 200 TABLET, FILM COATED ENTERAL at 09:00

## 2021-04-08 RX ADMIN — LEVALBUTEROL HYDROCHLORIDE SCH MG: 1.25 SOLUTION, CONCENTRATE RESPIRATORY (INHALATION) at 19:44

## 2021-04-08 RX ADMIN — HYDROCORTISONE SODIUM SUCCINATE SCH MG: 100 INJECTION, POWDER, FOR SOLUTION INTRAMUSCULAR; INTRAVENOUS at 01:25

## 2021-04-08 RX ADMIN — DEXTROSE MONOHYDRATE SCH MLS/HR: 50 INJECTION, SOLUTION INTRAVENOUS at 05:39

## 2021-04-08 RX ADMIN — CALCIUM POLYCARBOPHIL SCH EA: 625 TABLET, FILM COATED ORAL at 09:00

## 2021-04-08 RX ADMIN — HYDROCORTISONE SODIUM SUCCINATE SCH MG: 100 INJECTION, POWDER, FOR SOLUTION INTRAMUSCULAR; INTRAVENOUS at 11:40

## 2021-04-08 RX ADMIN — DEXTROSE MONOHYDRATE SCH MLS/HR: 50 INJECTION, SOLUTION INTRAVENOUS at 18:02

## 2021-04-08 RX ADMIN — LEVALBUTEROL HYDROCHLORIDE SCH MG: 1.25 SOLUTION, CONCENTRATE RESPIRATORY (INHALATION) at 07:22

## 2021-04-08 RX ADMIN — DEXTROSE MONOHYDRATE SCH MLS/HR: 50 INJECTION, SOLUTION INTRAVENOUS at 20:14

## 2021-04-08 RX ADMIN — DEXTROSE MONOHYDRATE SCH MLS/HR: 5 INJECTION INTRAVENOUS at 13:03

## 2021-04-08 RX ADMIN — DEXTROSE MONOHYDRATE SCH MLS/HR: 50 INJECTION, SOLUTION INTRAVENOUS at 11:32

## 2021-04-08 RX ADMIN — DEXTROSE MONOHYDRATE SCH MLS/HR: 50 INJECTION, SOLUTION INTRAVENOUS at 11:18

## 2021-04-08 RX ADMIN — DEXTROSE MONOHYDRATE SCH MLS/HR: 5 INJECTION INTRAVENOUS at 05:40

## 2021-04-08 RX ADMIN — INSULIN LISPRO SCH UNITS: 100 INJECTION, SOLUTION INTRAVENOUS; SUBCUTANEOUS at 13:25

## 2021-04-08 RX ADMIN — INSULIN LISPRO SCH UNITS: 100 INJECTION, SOLUTION INTRAVENOUS; SUBCUTANEOUS at 20:14

## 2021-04-08 RX ADMIN — MIDODRINE HYDROCHLORIDE SCH MG: 5 TABLET ORAL at 11:43

## 2021-04-08 RX ADMIN — SODIUM CHLORIDE, PRESERVATIVE FREE SCH ML: 5 INJECTION INTRAVENOUS at 15:14

## 2021-04-08 RX ADMIN — LEVALBUTEROL HYDROCHLORIDE SCH MG: 1.25 SOLUTION, CONCENTRATE RESPIRATORY (INHALATION) at 13:33

## 2021-04-08 RX ADMIN — FLUTICASONE PROPIONATE AND SALMETEROL XINAFOATE SCH PUFF: 115; 21 AEROSOL, METERED RESPIRATORY (INHALATION) at 19:44

## 2021-04-08 RX ADMIN — MIDODRINE HYDROCHLORIDE SCH MG: 5 TABLET ORAL at 08:00

## 2021-04-08 RX ADMIN — DEXTROSE MONOHYDRATE SCH MLS/HR: 5 INJECTION INTRAVENOUS at 18:10

## 2021-04-08 RX ADMIN — TIOTROPIUM BROMIDE SCH INHALATION: 18 CAPSULE ORAL; RESPIRATORY (INHALATION) at 08:10

## 2021-04-08 RX ADMIN — MIDODRINE HYDROCHLORIDE SCH MG: 5 TABLET ORAL at 15:14

## 2021-04-08 RX ADMIN — DEXTROSE MONOHYDRATE SCH MLS/HR: 5 INJECTION INTRAVENOUS at 00:26

## 2021-04-08 RX ADMIN — ATOVAQUONE SCH MG: 750 SUSPENSION ORAL at 09:00

## 2021-04-08 RX ADMIN — INSULIN LISPRO SCH UNITS: 100 INJECTION, SOLUTION INTRAVENOUS; SUBCUTANEOUS at 07:30

## 2021-04-08 RX ADMIN — ASPIRIN SCH MG: 300 SUPPOSITORY RECTAL at 09:00

## 2021-04-08 RX ADMIN — LEVALBUTEROL HYDROCHLORIDE SCH MG: 1.25 SOLUTION, CONCENTRATE RESPIRATORY (INHALATION) at 01:18

## 2021-04-08 RX ADMIN — HYDROCORTISONE SODIUM SUCCINATE SCH MG: 100 INJECTION, POWDER, FOR SOLUTION INTRAMUSCULAR; INTRAVENOUS at 21:32

## 2021-04-08 RX ADMIN — FLUTICASONE PROPIONATE AND SALMETEROL XINAFOATE SCH PUFF: 115; 21 AEROSOL, METERED RESPIRATORY (INHALATION) at 07:31

## 2021-04-08 RX ADMIN — INSULIN DETEMIR SCH UNITS: 100 INJECTION, SOLUTION SUBCUTANEOUS at 11:34

## 2021-04-08 RX ADMIN — ASPIRIN SCH MG: 300 SUPPOSITORY RECTAL at 00:25

## 2021-04-08 RX ADMIN — SODIUM CHLORIDE, PRESERVATIVE FREE SCH ML: 5 INJECTION INTRAVENOUS at 05:39

## 2021-04-08 RX ADMIN — HYDROXYCHLOROQUINE SULFATE SCH MG: 200 TABLET, FILM COATED ENTERAL at 20:13

## 2021-04-08 RX ADMIN — INSULIN LISPRO SCH UNITS: 100 INJECTION, SOLUTION INTRAVENOUS; SUBCUTANEOUS at 17:12

## 2021-04-08 RX ADMIN — MORPHINE SULFATE PRN MG: 2 INJECTION, SOLUTION INTRAMUSCULAR; INTRAVENOUS at 17:15

## 2021-04-08 RX ADMIN — HYDROCORTISONE SODIUM SUCCINATE SCH MG: 100 INJECTION, POWDER, FOR SOLUTION INTRAMUSCULAR; INTRAVENOUS at 15:14

## 2021-04-08 RX ADMIN — MORPHINE SULFATE PRN MG: 2 INJECTION, SOLUTION INTRAMUSCULAR; INTRAVENOUS at 00:00

## 2021-04-08 RX ADMIN — DEXTROSE MONOHYDRATE SCH MLS/HR: 50 INJECTION, SOLUTION INTRAVENOUS at 03:38

## 2021-04-08 NOTE — ECGEPIP
[facility]

                                       

                                       Test Date:    2021

Pat Name:     ARIE FULLER               Department:   

Patient ID:   [id number]                 Room:         [place] [address]

Gender:       Male                     Technician:   marvin

:          1955               Requested By: TEO EVANS

Order Number: [id number]     Reading MD:   Sukumar Avila

                                 Measurements

Intervals                              Axis          

Rate:         87                       P:            47

MN:           174                      QRS:          -1

QRSD:         112                      T:            46

QT:           392                                    

QTc:          471                                    

                           Interpretive Statements

Normal sinus rhythm

P pulmonale

Indeterminant frontal axis with incomplete RBBB, prominent right precordial R

w

waves and persistent S waves V5 and V6; body habitus versus pulmonary

disease.

Increased voltages following drainage of pericardial fluid yesterday

Electronically Signed on 2021 10:42:58 EDT by Sukumar Avila

## 2021-04-08 NOTE — IPNPDOC
Subjective


Date Seen


The patient was seen on 4/8/21.





Subjective


Chief Complaint/HPI


Mr. Christine is a 65 year old male with pulmonary interstitial fibrosis here with 

spontaneous pneumothorax of the right lung and now has developed CVA. Last 

night, central line was place and Levophed started to maintain SBP >140 due to 

acute CVA. This morning, he was more awake. He did not want eat or have anymore 

treatment this morning. He was A&Ox2, he did not know year and thought it may be

1970. Discussed with patient's decision maker Kenny Christine. After seeing the 

patient today, the patient's decision maker will have family discussion and po

ssibly proceed towards CMO tomorrow.





Objective


Physical Examination


Eye Exam:  Negative: Sclera icteric


ENT Exam:  Positive: Atraumatic


Chest Exam:  Positive: Clear to auscultation


Heart Exam:  Positive: Rate Normal, Regular Rhythm


Abdomen Exam:  Positive: Normal bowel sounds, Soft; 


   Negative: Tenderness


Extremity Exam:  Negative: Edema


Neuro Exam:  Negative: Normal Speech (Slightly slurred), Strength at 5/5 X4 ext 

(Cannot lift right arm, right leg, left leg, against gravitiy. Now able to 

wiggle toes bilaterally)


Psych Exam:  Negative: Oriented x 3





Assessment /Plan


Assessment


Mr. Christine is a 65 year old male with pulmonary interstitial fibrosis here with 

spontaneous pneumothorax of the right lung. Patient had talc pleurodesis on 

3/22/2021 and removed chest tubes on 4/4/2021. On 4/6/2021, patient demonstrated

stroke like symptoms with right facial droop, right arm and leg weakness, and 

left leg weakness. Neurology consulted. Discussed with patient about risks and 

benefits of TPA and patient is agreeable. Gave TPA on 7/6/2021





Patient did not improve after TPA. We were able to obtain MRI afterwards. 

Demonstrated bilateral multiple infarcts which is concerning for cardioembolic 

source. Echocardiogram did not demonstrate vegetation, but will have to wait for

blood cultures before pursuing TOBI. Otherwise, on Levophed, IV fluids, and broad

spectrum IV antibiotics. Also on Hydrocortisone due to history of long steroid 

use.





I explained the situation to Kenny, who will be making decisions for the patient. 

He saw his father awake today. He will have a family meeting and possibly 

proceed to CMO tomorrow as patient refuses to eat.





Plan/VTE


VTE Prophylaxis Ordered?:  Yes





Plan


1. Spontaneous pneumothorax on the right with recurrence, s/p blood patch on 

3/17 and 3/19 and talc pleurodesis on 3/22


-Dr. Hay following recommendations appreciated


-Chest tubes removed on 4/4/2021





2. CVA


-On 4/6/2021, demonstrated right face, arm, and leg weakness along with left leg

weakness


-Neurology consulted, recommendations appreciated


-Risks and benefits discussed with patient, patient was agreeable to TPA


-TPA protocol ordered


-Did not improve with TPA.


-MRI demonstrated bilateral multiple infarcts, concerning for cardioembolic 

source


-TTE negative. Will wait for blood cultures before pursuing TOBI


-Will need blood pressure between 140 to 180 in the period of acute stroke


-On 4/8/2021, he is able to wiggle toes


-Continue aspirin





3. Shock


-Unclear etiology, may be secondary to infections vs adrenal insufficiency


-Did not respond to 30ml/kg of fluid


-Started stress dose steroids and Levophed


-Continue vancomycin and Zosyn





4. Severe pulmonary hypertension


-Reported by Dr. Avila after reading echocardiogram


-Per Dr. Avila, poor prognosis. Reported this prognosis to Kenny leon). 





5. Diabetes mellitus


-Sliding scale insulin 





6. Lupus


-Patient was scheduled for Cytoxan on 3/11/2021 per patient's Rheumatologist 

office in [place], [place]


-Dr. Benitez spoke with patient, patient request to defer Cytoxan until he has 

recovered


-Continue with hydrocortisone and Plaquenil


Of note, due to patient's chronic and high-dose steroids, he is not a candidate

for the COVID vaccination at this time





7. GERD


-Continue pantoprazole





8. DVT ppx


-No heparin due to TPA


-SCD and TEDs





Disposition: Poor prognosis as patient refuses to eat and has severe bilateral 

multiple CVA. Patient's decision maker to have family meeting this afternoon and

decide on possibly CMO tomorrow morning





VS, I&O, 24H, Fishbone


Vital Signs/I&O





Vital Signs








  Date Time  Temp Pulse Resp B/P (MAP) Pulse Ox O2 Delivery O2 Flow Rate FiO2


 


4/8/21 18:30  93  158/88 (111) 98 Nasal Cannula 2.0 


 


4/8/21 18:02   18     


 


4/8/21 16:00 99.0       


 


4/8/21 08:00        100











l


I&O- Last 24 Hours up to 6 AM 


 


 4/8/21





 06:00


 


Intake Total 3561 ml


 


Output Total 300 ml


 


Balance 3261 ml











Laboratory Data


24H LABS


Laboratory Tests 2


4/7/21 21:31: Bedside Glucose (Misc Panel) 91


4/7/21 23:53: Troponin I 1.08H


4/8/21 05:07: Bedside Glucose (Misc Panel) 294H


4/8/21 05:09: 


Troponin I 0.77#H, Nucleated Red Blood Cells % (auto) 0.0, Anion Gap 7L, 

Glomerular Filtration Rate > 60.0, Calcium Level 8.2L


4/8/21 09:52: Bedside Glucose (Misc Panel) 289H


4/8/21 13:11: Bedside Glucose (Misc Panel) 290H


4/8/21 17:03: Bedside Glucose (Misc Panel) 247H


4/8/21 18:32: 


Anion Gap 3L, Glomerular Filtration Rate > 60.0, Calcium Level 8.1L, Vancomycin 

Level Trough 13.7


CBC/BMP


Laboratory Tests


4/8/21 05:09








4/8/21 18:32








Microbiology





Microbiology


4/7/21 Blood Culture - Preliminary, Resulted


         No growth after 24 hours . All specim...


4/7/21 Blood Culture - Preliminary, Resulted


         No growth after 24 hours . All specim...











TEO JACKSON DO                Apr 8, 2021 19:43

## 2021-04-08 NOTE — RO
OPERATIVE NOTE



DATE OF OPERATION: 04/07/2021



PREOPERATIVE DIAGNOSIS: Hypotension.



POSTOPERATIVE DIAGNOSIS: Hypotension.



PROCEDURE: Insertion of triple lumen central venous catheter.



SITE: Right femoral vein.



SURGEON: Дмитрий Greer MD



ASSISTANT: 



ANESTHESIA: 1% Xylocaine.



DESCRIPTION OF PROCEDURE: Procedure was performed emergently. After the right

femoral area was prepped and draped in the usual sterile manner the right

femoral vein was cannulated. Modified Seldinger technique, triple lumen central

venous catheter was advanced. Good venous return was obtained from all three

ports. The line was then sutured in place. There was some oozing from this

site. Sandbag was applied. Each port was then flushed and sterile dressing

applied.

## 2021-04-08 NOTE — ECHO
DATE OF PROCEDURE: 04/07/2021



Age: 65

Gender: Male 

Height: 72 inches

Weight: 152 pounds

Body Surface Area: 1.9 m2



PATIENT LOCATION: Inpatient ICU Room 3207.



REFERRING PHYSICIAN: Miguel Mittal DO.



INDICATION: CVA  Cardiac source of embolic material?



MEASUREMENTS: 

2D Measurements:   

      RV  5.8 cm 

               LV  4.1 cm

      Septum 1.2 cm

      Posterior wall 1.2 cm

      Aortic Root 4.0 cm

      LA  3.3 cm

      LVEF 45-50%



Doppler Measurements:

      AV  0.55 m/s

      LVOT  0.41 m/s

      LVOT diameter 2.4 cm

      MV-E 77, A 52, EA ratio 1.5

      Early mitral deceleration time 142 msec

      E prime medial 11, A prime medial 5.9, E prime lateral 12.7

      Average E/E prime ratio 6.5/PCWP 10 mmHg 

      PV - 0.45 m/s

      Pulmonary artery acceleration time 63 msec

      PASP 53 mmHg

      IVC  Could not be visualized

       

COMMENTS: 

Sinus tachycardia without intraventricular conduction disturbance. 



A technically challenging study as the patient following his cerebrovascular 
accident was somewhat uncooperative, but some diagnostically useful information 
was still obtained. 



M-mode and 2-dimensional echocardiography was performed with pulse, continuous 
wave, color flow, and tissue Doppler studies. 



Borderline concentric left ventricular hypertrophy with obvious paradoxical 
septal wall motion abnormality related to right ventricular pressure overload 
with at least mild impairment of global resting left ventricular systolic 
function. 



Normal left atrial size and current Doppler assessment of LV diastolic function 
and estimated mean left atrial pressure.



Prominently dilated right heart chambers with right ventricular free wall 
hypokinesis and Doppler evidence of at least moderate to moderately severe 
pulmonary hypertension (an underestimate). 



For technical reasons could not image his inferior vena cava to further 
accurately define his central venous pressure, but this is believed to be 
elevated. 



Borderline dilated aortic root. 



Mild aortic valvular sclerosis without functional abnormality but premature 
valve closure related to reduced forward stroke volume. 



Myxomatous proliferation of the mitral valve with normal leaflet excursion with 
posterior systolic buckling of the anterior leaflet with associated mild to 
moderate mitral insufficiency. 



Normal appearing tricuspid valve with moderate insufficiency.



No apparent intracardiac mass or pericardial effusion. 



The patient was not cooperative to allow us to perform saline contrast study, 
but with the observed severe pulmonary hypertension and right heart chamber 
enlargement, this fellow may well have had a dilated patent foramen ovale and 
paradoxical embolization that could have caused his cerebrovascular accident. 



Earlier this evening, the patient became quite hypotensive following his MRI. He
received TPA for his cerebrovascular accident yesterday, and this may have 
liberated a deep venous thrombus that may have further aggravated his pulmonary 
hypertension and provoked an acute on chronic cor pulmonale. In addition to his 
end-stage pulmonary fibrosis with the observed pulmonary hypertension and 
systemic hypotension, his prognosis is extremely guarded. 



A preliminary report of this study was related to the patient's physician 
directly. 

LENORA

## 2021-04-09 VITALS — SYSTOLIC BLOOD PRESSURE: 136 MMHG | DIASTOLIC BLOOD PRESSURE: 69 MMHG

## 2021-04-09 VITALS — SYSTOLIC BLOOD PRESSURE: 157 MMHG | DIASTOLIC BLOOD PRESSURE: 87 MMHG

## 2021-04-09 VITALS — SYSTOLIC BLOOD PRESSURE: 153 MMHG | DIASTOLIC BLOOD PRESSURE: 79 MMHG

## 2021-04-09 VITALS — DIASTOLIC BLOOD PRESSURE: 107 MMHG | SYSTOLIC BLOOD PRESSURE: 166 MMHG

## 2021-04-09 VITALS — DIASTOLIC BLOOD PRESSURE: 88 MMHG | SYSTOLIC BLOOD PRESSURE: 157 MMHG

## 2021-04-09 VITALS — DIASTOLIC BLOOD PRESSURE: 90 MMHG | SYSTOLIC BLOOD PRESSURE: 166 MMHG

## 2021-04-09 VITALS — DIASTOLIC BLOOD PRESSURE: 99 MMHG | SYSTOLIC BLOOD PRESSURE: 164 MMHG

## 2021-04-09 VITALS — DIASTOLIC BLOOD PRESSURE: 83 MMHG | SYSTOLIC BLOOD PRESSURE: 169 MMHG

## 2021-04-09 VITALS — DIASTOLIC BLOOD PRESSURE: 84 MMHG | SYSTOLIC BLOOD PRESSURE: 144 MMHG

## 2021-04-09 VITALS — SYSTOLIC BLOOD PRESSURE: 178 MMHG | DIASTOLIC BLOOD PRESSURE: 80 MMHG

## 2021-04-09 VITALS — SYSTOLIC BLOOD PRESSURE: 143 MMHG | DIASTOLIC BLOOD PRESSURE: 69 MMHG

## 2021-04-09 VITALS — DIASTOLIC BLOOD PRESSURE: 75 MMHG | SYSTOLIC BLOOD PRESSURE: 149 MMHG

## 2021-04-09 VITALS — SYSTOLIC BLOOD PRESSURE: 160 MMHG | DIASTOLIC BLOOD PRESSURE: 77 MMHG

## 2021-04-09 VITALS — DIASTOLIC BLOOD PRESSURE: 82 MMHG | SYSTOLIC BLOOD PRESSURE: 169 MMHG

## 2021-04-09 VITALS — SYSTOLIC BLOOD PRESSURE: 138 MMHG | DIASTOLIC BLOOD PRESSURE: 6 MMHG

## 2021-04-09 VITALS — DIASTOLIC BLOOD PRESSURE: 75 MMHG | SYSTOLIC BLOOD PRESSURE: 135 MMHG

## 2021-04-09 VITALS — DIASTOLIC BLOOD PRESSURE: 89 MMHG | SYSTOLIC BLOOD PRESSURE: 162 MMHG

## 2021-04-09 VITALS — DIASTOLIC BLOOD PRESSURE: 79 MMHG | SYSTOLIC BLOOD PRESSURE: 159 MMHG

## 2021-04-09 VITALS — SYSTOLIC BLOOD PRESSURE: 157 MMHG | DIASTOLIC BLOOD PRESSURE: 84 MMHG

## 2021-04-09 VITALS — SYSTOLIC BLOOD PRESSURE: 171 MMHG | DIASTOLIC BLOOD PRESSURE: 81 MMHG

## 2021-04-09 VITALS — DIASTOLIC BLOOD PRESSURE: 75 MMHG | SYSTOLIC BLOOD PRESSURE: 152 MMHG

## 2021-04-09 VITALS — SYSTOLIC BLOOD PRESSURE: 139 MMHG | DIASTOLIC BLOOD PRESSURE: 76 MMHG

## 2021-04-09 VITALS — DIASTOLIC BLOOD PRESSURE: 81 MMHG | SYSTOLIC BLOOD PRESSURE: 153 MMHG

## 2021-04-09 VITALS — DIASTOLIC BLOOD PRESSURE: 91 MMHG | SYSTOLIC BLOOD PRESSURE: 154 MMHG

## 2021-04-09 VITALS — SYSTOLIC BLOOD PRESSURE: 137 MMHG | DIASTOLIC BLOOD PRESSURE: 82 MMHG

## 2021-04-09 VITALS — DIASTOLIC BLOOD PRESSURE: 83 MMHG | SYSTOLIC BLOOD PRESSURE: 141 MMHG

## 2021-04-09 VITALS — DIASTOLIC BLOOD PRESSURE: 102 MMHG | SYSTOLIC BLOOD PRESSURE: 148 MMHG

## 2021-04-09 VITALS — DIASTOLIC BLOOD PRESSURE: 91 MMHG | SYSTOLIC BLOOD PRESSURE: 156 MMHG

## 2021-04-09 VITALS — SYSTOLIC BLOOD PRESSURE: 157 MMHG | DIASTOLIC BLOOD PRESSURE: 82 MMHG

## 2021-04-09 VITALS — DIASTOLIC BLOOD PRESSURE: 97 MMHG | SYSTOLIC BLOOD PRESSURE: 166 MMHG

## 2021-04-09 VITALS — SYSTOLIC BLOOD PRESSURE: 161 MMHG | DIASTOLIC BLOOD PRESSURE: 72 MMHG

## 2021-04-09 VITALS — DIASTOLIC BLOOD PRESSURE: 98 MMHG | SYSTOLIC BLOOD PRESSURE: 164 MMHG

## 2021-04-09 VITALS — SYSTOLIC BLOOD PRESSURE: 173 MMHG | DIASTOLIC BLOOD PRESSURE: 83 MMHG

## 2021-04-09 VITALS — SYSTOLIC BLOOD PRESSURE: 152 MMHG | DIASTOLIC BLOOD PRESSURE: 98 MMHG

## 2021-04-09 VITALS — SYSTOLIC BLOOD PRESSURE: 168 MMHG | DIASTOLIC BLOOD PRESSURE: 95 MMHG

## 2021-04-09 VITALS — SYSTOLIC BLOOD PRESSURE: 164 MMHG | DIASTOLIC BLOOD PRESSURE: 81 MMHG

## 2021-04-09 VITALS — DIASTOLIC BLOOD PRESSURE: 83 MMHG | SYSTOLIC BLOOD PRESSURE: 144 MMHG

## 2021-04-09 VITALS — SYSTOLIC BLOOD PRESSURE: 169 MMHG | DIASTOLIC BLOOD PRESSURE: 81 MMHG

## 2021-04-09 VITALS — SYSTOLIC BLOOD PRESSURE: 160 MMHG | DIASTOLIC BLOOD PRESSURE: 82 MMHG

## 2021-04-09 VITALS — DIASTOLIC BLOOD PRESSURE: 69 MMHG | SYSTOLIC BLOOD PRESSURE: 160 MMHG

## 2021-04-09 VITALS — DIASTOLIC BLOOD PRESSURE: 84 MMHG | SYSTOLIC BLOOD PRESSURE: 176 MMHG

## 2021-04-09 VITALS — OXYGEN SATURATION: 97 %

## 2021-04-09 LAB
BUN SERPL-MCNC: 12 MG/DL (ref 7–18)
CALCIUM SERPL-MCNC: 8.3 MG/DL (ref 8.8–10.2)
CHLORIDE SERPL-SCNC: 97 MEQ/L (ref 98–107)
CO2 SERPL-SCNC: 35 MEQ/L (ref 21–32)
CREAT SERPL-MCNC: 0.55 MG/DL (ref 0.7–1.3)
GFR SERPL CREATININE-BSD FRML MDRD: > 60 ML/MIN/{1.73_M2} (ref 49–?)
GLUCOSE SERPL-MCNC: 277 MG/DL (ref 70–100)
HCT VFR BLD AUTO: 46 % (ref 42–52)
HGB BLD-MCNC: 14.6 G/DL (ref 13.5–17.5)
MCH RBC QN AUTO: 29.6 PG (ref 27–33)
MCHC RBC AUTO-ENTMCNC: 31.7 G/DL (ref 32–36.5)
MCV RBC AUTO: 93.3 FL (ref 80–96)
PLATELET # BLD AUTO: 201 10^3/UL (ref 150–450)
POTASSIUM SERPL-SCNC: 4.3 MEQ/L (ref 3.5–5.1)
RBC # BLD AUTO: 4.93 10^6/UL (ref 4.3–6.1)
SODIUM SERPL-SCNC: 136 MEQ/L (ref 136–145)
WBC # BLD AUTO: 17.7 10^3/UL (ref 4–10)

## 2021-04-09 RX ADMIN — SODIUM CHLORIDE, PRESERVATIVE FREE SCH ML: 5 INJECTION INTRAVENOUS at 22:46

## 2021-04-09 RX ADMIN — MIDODRINE HYDROCHLORIDE SCH MG: 5 TABLET ORAL at 11:39

## 2021-04-09 RX ADMIN — INSULIN LISPRO SCH UNITS: 100 INJECTION, SOLUTION INTRAVENOUS; SUBCUTANEOUS at 11:39

## 2021-04-09 RX ADMIN — SODIUM CHLORIDE, PRESERVATIVE FREE SCH ML: 5 INJECTION INTRAVENOUS at 06:00

## 2021-04-09 RX ADMIN — DEXTROSE MONOHYDRATE SCH MLS/HR: 50 INJECTION, SOLUTION INTRAVENOUS at 01:20

## 2021-04-09 RX ADMIN — DEXTROSE MONOHYDRATE SCH MLS/HR: 50 INJECTION, SOLUTION INTRAVENOUS at 04:01

## 2021-04-09 RX ADMIN — DEXTROSE MONOHYDRATE SCH MLS/HR: 50 INJECTION, SOLUTION INTRAVENOUS at 01:25

## 2021-04-09 RX ADMIN — INSULIN LISPRO SCH UNITS: 100 INJECTION, SOLUTION INTRAVENOUS; SUBCUTANEOUS at 07:35

## 2021-04-09 RX ADMIN — MORPHINE SULFATE PRN MG: 2 INJECTION, SOLUTION INTRAMUSCULAR; INTRAVENOUS at 13:55

## 2021-04-09 RX ADMIN — DEXTROSE MONOHYDRATE SCH MLS/HR: 5 INJECTION INTRAVENOUS at 00:21

## 2021-04-09 RX ADMIN — ASPIRIN SCH MG: 300 SUPPOSITORY RECTAL at 07:36

## 2021-04-09 RX ADMIN — LEVALBUTEROL HYDROCHLORIDE SCH MG: 1.25 SOLUTION, CONCENTRATE RESPIRATORY (INHALATION) at 01:36

## 2021-04-09 RX ADMIN — MORPHINE SULFATE PRN MG: 2 INJECTION, SOLUTION INTRAMUSCULAR; INTRAVENOUS at 18:24

## 2021-04-09 RX ADMIN — FLUTICASONE PROPIONATE AND SALMETEROL XINAFOATE SCH PUFF: 115; 21 AEROSOL, METERED RESPIRATORY (INHALATION) at 19:38

## 2021-04-09 RX ADMIN — CALCIUM POLYCARBOPHIL SCH EA: 625 TABLET, FILM COATED ORAL at 07:36

## 2021-04-09 RX ADMIN — HYDROCORTISONE SODIUM SUCCINATE SCH MG: 100 INJECTION, POWDER, FOR SOLUTION INTRAMUSCULAR; INTRAVENOUS at 01:20

## 2021-04-09 RX ADMIN — DEXTROSE MONOHYDRATE SCH MLS/HR: 50 INJECTION, SOLUTION INTRAVENOUS at 08:40

## 2021-04-09 RX ADMIN — SODIUM CHLORIDE, PRESERVATIVE FREE SCH ML: 5 INJECTION INTRAVENOUS at 14:00

## 2021-04-09 RX ADMIN — DEXTROSE MONOHYDRATE SCH MLS/HR: 5 INJECTION INTRAVENOUS at 06:04

## 2021-04-09 RX ADMIN — ATOVAQUONE SCH MG: 750 SUSPENSION ORAL at 07:36

## 2021-04-09 RX ADMIN — LEVALBUTEROL HYDROCHLORIDE SCH MG: 1.25 SOLUTION, CONCENTRATE RESPIRATORY (INHALATION) at 08:00

## 2021-04-09 RX ADMIN — DEXTROSE MONOHYDRATE SCH MLS/HR: 5 INJECTION INTRAVENOUS at 11:39

## 2021-04-09 RX ADMIN — MORPHINE SULFATE PRN MG: 2 INJECTION, SOLUTION INTRAMUSCULAR; INTRAVENOUS at 16:11

## 2021-04-09 RX ADMIN — HYDROCORTISONE SODIUM SUCCINATE SCH MG: 100 INJECTION, POWDER, FOR SOLUTION INTRAMUSCULAR; INTRAVENOUS at 07:36

## 2021-04-09 RX ADMIN — INSULIN DETEMIR SCH UNITS: 100 INJECTION, SOLUTION SUBCUTANEOUS at 07:36

## 2021-04-09 RX ADMIN — HYDROXYCHLOROQUINE SULFATE SCH MG: 200 TABLET, FILM COATED ENTERAL at 07:36

## 2021-04-09 RX ADMIN — MIDODRINE HYDROCHLORIDE SCH MG: 5 TABLET ORAL at 07:36

## 2021-04-09 RX ADMIN — FLUTICASONE PROPIONATE AND SALMETEROL XINAFOATE SCH PUFF: 115; 21 AEROSOL, METERED RESPIRATORY (INHALATION) at 08:09

## 2021-04-09 RX ADMIN — TIOTROPIUM BROMIDE SCH INHALATION: 18 CAPSULE ORAL; RESPIRATORY (INHALATION) at 08:11

## 2021-04-09 NOTE — IPNPDOC
Subjective


Date Seen


The patient was seen on 4/9/21.





Subjective


Chief Complaint/HPI


Mr. Christine is a 65 year old male with pulmonary interstitial fibrosis here with 

spontaneous pneumothorax of the right lung and now has developed CVA. This 

morning, he was more alert. He knew the month, location, and his name. He did 

not want any further treatments and wanted to be made comfortable. Nephew Kenny 

and Janet arrived to the ICU and with the patient had a discussion of goals of 

care. Everyone was in agreement with CMO and patient was made CMO with the 

understanding we will withdrawal life sustaining treatment and he will most 

likely pass away, but he will be made comfortable. We filled out the new MOLST 

and Gina spent time with Nixon Christine





Objective


Physical Examination


Eye Exam:  Negative: Sclera icteric


ENT Exam:  Positive: Atraumatic


Chest Exam:  Positive: Clear to auscultation


Heart Exam:  Positive: Rate Normal, Regular Rhythm


Abdomen Exam:  Positive: Normal bowel sounds, Soft; 


   Negative: Tenderness


Extremity Exam:  Negative: Edema


Neuro Exam:  Positive: Normal Speech (Slightly slurred), Strength at 5/5 X4 ext 

(Cannot lift right arm, right leg, left leg, against gravitiy. Now able to 

wiggle toes bilaterally)


Psych Exam:  Positive: Oriented x 3





Assessment /Plan


Assessment


Mr. Christine is a 65 year old male with pulmonary interstitial fibrosis here with 

spontaneous pneumothorax of the right lung. Patient had talc pleurodesis on 

3/22/2021 and removed chest tubes on 4/4/2021. On 4/6/2021, patient demonstrated

stroke like symptoms with right facial droop, right arm and leg weakness, and 

left leg weakness. Neurology consulted. Discussed with patient about risks and 

benefits of TPA and patient is agreeable. Gave TPA on 7/6/2021





Patient did not improve after TPA. We were able to obtain MRI afterwards. 

Demonstrated bilateral multiple infarcts which is concerning for cardioembolic 

source. Echocardiogram did not demonstrate vegetation, but will have to wait for

blood cultures before pursuing TOBI. Otherwise, on Levophed, IV fluids, and broad

spectrum IV antibiotics. Also on Hydrocortisone due to history of long steroid 

use.





Patient is A&Ox3 on 4/9/2021. He did not want treatment and wanted to be 

comfortable. We had a family discussion on 4/9/2021. Everyone was in agreement 

and patient was made comfortable.





Plan/VTE


VTE Prophylaxis Ordered?:  Yes





Plan


1. Spontaneous pneumothorax on the right with recurrence, s/p blood patch on 

3/17 and 3/19 and talc pleurodesis on 3/22


2. Bilateral multifocal CVA


3. Shock


4. Severe pulmonary hypertension


5. Diabetes mellitus


6. Lupus


7. GERD





Disposition: CMO





VS, I&O, 24H, Fishbone


Vital Signs/I&O





Vital Signs








  Date Time  Temp Pulse Resp B/P (MAP) Pulse Ox O2 Delivery O2 Flow Rate FiO2


 


4/9/21 19:38     97 Nasal Cannula 2.0 


 


4/9/21 18:34   12     


 


4/9/21 13:55  76      


 


4/9/21 12:00 97.0   139/76 (97)    


 


4/8/21 08:00        100














I&O- Last 24 Hours up to 6 AM 


 


 4/9/21





 06:00


 


Intake Total 2171 ml


 


Output Total 750 ml


 


Balance 1421 ml











Laboratory Data


24H LABS


Laboratory Tests 2


4/9/21 04:01: 


Nucleated Red Blood Cells % (auto) 0.0, Anion Gap 4L, Glomerular Filtration Rate

> 60.0, Calcium Level 8.3L


4/9/21 07:31: Bedside Glucose (Misc Panel) 282H


4/9/21 11:31: Bedside Glucose (Misc Panel) 191H


CBC/BMP


Laboratory Tests


4/9/21 04:01








Microbiology





Microbiology


4/7/21 Blood Culture - Preliminary, Resulted


         No Growth after 48 hours. All Specime...


4/7/21 Blood Culture - Preliminary, Resulted


         No Growth after 48 hours. All Specime...











TEO JACKSON DO                Apr 9, 2021 20:34

## 2021-04-10 NOTE — DS.PDOC
Discharge Summary


General


Date of Admission


Mar 5, 2021 at 15:29


Date of Discharge


Apr 9, 2021





Discharge Summary


PROCEDURES PERFORMED DURING STAY: 


1. Chest tube insertion


2. Blood patch 


3. Talc pleurodesis





ADMITTING DIAGNOSES: 


1. Spontaneous pneumothorax


2. Diabetes mellitus


3. Systemic lupus erythematosus


4. Chronic pain 2/2 lumbosacral degenerative disc disease and degenerative 

arthritis


5. End stage pulmonary fibrosis





DISCHARGE DIAGNOSES:


1. Spontaneous pneumothorax


2. Diabetes mellitus


3. Systemic lupus erythematosus


4. Chronic pain 2/2 lumbosacral degenerative disc disease and degenerative 

arthritis


5. End stage pulmonary fibrosis


6. Bilateral multifocal CVA


7. Shock 


8. Severe pulmonary hypertension





COMPLICATIONS/CHIEF COMPLAINT: Pneumothorax.





HISTORY OF PRESENT ILLNESS: Mr. Christine is a 65 year old male with SLE and 

endstage pulmonary fibrosis who is found to have right pneumothorax. When seen 

in the ED by admitting physician, he already had a chest tube place. He 

complains of pleuritic chest pain which had limited history taking. Patient is 

familiar to [facility]. Please see Dr. Hay's consult note for further 

information about patient's illness. 





HOSPITAL COURSE: Chest tube was managed by Dr. Hay. patient had a prolonged 

course with the chest tube and had leaks. Attempted blood patch, but Dr. Hay

had to do Talc pleurodesis. Talc pleurodesis was successful and chest tubes were

removed. Otherwise, patient was due for Cytoxin, but opted to delay Cytoxin 

treatment until healthier per Dr. Benitez. Patient was doing better off of chest 

tube. He was ambulated and physical therapy suggested he may be able to go home 

with home physical therapy. On 4/6/2021, patient suddenly developed right arm 

and leg weakness, left leg weakness, and right facial droop. Spoke with 

neurology here and neurology at [facility]. Patient was a complex case with risk of 

hemothorax. Ultimately, discussed with patient about TPA the risks and benefits 

of TPA. TPA may be able to reverse his weakness, but it was not always 

successful and carries risks of bleeding. Patient understood and agreed to TPA. 

After TPA, patient did not improved. He had dysphagia and intermittent 

confusion. CT head negative. Attempted to obtain MRI, but delayed due to 

inability to reach patient's decision maker able MRI consent. When we were able 

to obtain MRI, discussed MRI with neurology, Dr. Kaba. Dr Kaba 

reviewed the MRI. Patient had bilateral multifocal ischemic infarcts. After the 

MRI, patient also developed shock. Unclear etiology, but patient was started on 

antibiotics, IVF, steroids, and pressors. Patient's SBP was maintained above SBP

140. His mentation and clarity greatly improved. Echocardiogram was negative for

vegetation or clots, but would need to pursue a TOBI if blood cultures returned 

positive. Of note lower extremity US was negative for clots, but this was post 

TPA. Patient was on heparin for DVT ppx prior to stroke like symptoms. Reached 

out to neurology. Patient has a poor prognosis. There were bilateral clots in 

the cerebrum and cerebellum. 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 

Discuss these findings with the patient's point of contact and nephew, Kenny Christine

(Patient's sister, Antoinette Christine, had deferred decision making to Kenny Christine, and 

reported paper work had been done). Kenny discussed with rest of family and met 

with patient. On 4/9/2021, Nixon Christine wanted CMO and rest of family was in 

agreement. Nixon Christine was made comfortable. On 4/9/2021 at 23:43, patient 

peacefully and comfortably passed away.





Vital Signs/I&Os





Vital Signs








  Date Time  Temp Pulse Resp B/P (MAP) Pulse Ox O2 Delivery O2 Flow Rate FiO2


 


4/9/21 19:38     97 Nasal Cannula 2.0 


 


4/9/21 18:34   12     


 


4/9/21 13:55  76      


 


4/9/21 12:00 97.0   139/76 (97)    


 


4/8/21 08:00        100














I&O- Last 24 Hours up to 6 AM 


 


 4/10/21





 06:00


 


Intake Total 280 ml


 


Output Total 400 ml


 


Balance -120 ml











Laboratory Data


Labs 24H


Laboratory Tests 2


4/9/21 07:31: Bedside Glucose (Misc Panel) 282H


4/9/21 11:31: Bedside Glucose (Misc Panel) 191H


FSBS





Laboratory Tests








Test


 4/9/21


07:31 4/9/21


11:31 Range/Units


 


 


Bedside Glucose (Misc Panel) 282 191   MG/DL











Microbiology





Microbiology


4/7/21 Blood Culture - Preliminary, Resulted


         No Growth after 48 hours. All Specime...


4/7/21 Blood Culture - Preliminary, Resulted


         No Growth after 48 hours. All Specime...





Discharge Medications


Scheduled


Atovaquone (Atovaquone) 750 Mg/5 Ml Oral.susp, 10 ML PO DAILY, (Reported)


Calcium Citrate/Vitamin D3 (Citracal + D Maximum Caplet) 1 Each Tablet, 2 TAB PO

BID, (Reported)


Dexlansoprazole (Dexilant) 60 Mg Cap.dr.bp, 60 MG PO DAILY, (Reported)


Empagliflozin/Linagliptin (Glyxambi 25 mg-5 mg Tablet) 1 Each Tablet, 1 TAB PO 

DAILY, (Reported)


Fluticasone/Vilanterol (Breo Ellipta 200-25 Mcg INH) 1 Each Blst.w.dev, 1 PUFF 

INH DAILY, (Reported)


Glimepiride (Glimepiride) 4 Mg Tablet, 8 MG PO DAILY, (Reported)


Hydroxychloroquine Sulfate (Hydroxychloroquine Sulfate) 200 Mg Tablet, 200 MG PO

BID, (Reported)


Pioglitazone HCl (Actos) 45 Mg Tablet, 45 MG PO DAILY, (Reported)


Prednisone (Prednisone) 20 Mg Tablet, 60 MG PO DAILY, (Reported)


Tiotropium Bromide Monohydrate (Spiriva) 18 Mcg Cap.w.dev, 1 PUFF INH DAILY, 

(Reported)





Scheduled PRN


Albuterol Sulf (Albuterol Sulfate) 2.5 Mg/3 Ml Vial.neb, 1 VIAL NEB QID PRN for 

SHORTNESS OF BREATH, (Reported)





Allergies


Coded Allergies:  


     No Known Drug Allergies (Verified  Allergy, Unknown, 3/5/21)











TEO JACKSON DO               Apr 10, 2021 07:12